# Patient Record
Sex: FEMALE | Race: WHITE | NOT HISPANIC OR LATINO | Employment: FULL TIME | URBAN - METROPOLITAN AREA
[De-identification: names, ages, dates, MRNs, and addresses within clinical notes are randomized per-mention and may not be internally consistent; named-entity substitution may affect disease eponyms.]

---

## 2017-02-06 ENCOUNTER — ALLSCRIPTS OFFICE VISIT (OUTPATIENT)
Dept: OTHER | Facility: OTHER | Age: 37
End: 2017-02-06

## 2017-04-11 ENCOUNTER — ALLSCRIPTS OFFICE VISIT (OUTPATIENT)
Dept: OTHER | Facility: OTHER | Age: 37
End: 2017-04-11

## 2017-04-11 LAB
BACTERIA UR QL AUTO: NORMAL
CLUE CELL (HISTORICAL): NORMAL
HYPHAL YEAST (HISTORICAL): NORMAL
PH FLUID (HISTORICAL): NORMAL
TRICHOMONAS (HISTORICAL): NORMAL
YEAST (HISTORICAL): NORMAL

## 2017-04-20 ENCOUNTER — ALLSCRIPTS OFFICE VISIT (OUTPATIENT)
Dept: OTHER | Facility: OTHER | Age: 37
End: 2017-04-20

## 2017-05-01 ENCOUNTER — LAB CONVERSION - ENCOUNTER (OUTPATIENT)
Dept: OTHER | Facility: OTHER | Age: 37
End: 2017-05-01

## 2017-05-01 LAB
ADDITIONAL INFORMATION (HISTORICAL): ABNORMAL
ADEQUACY: (HISTORICAL): ABNORMAL
COMMENT (HISTORICAL): ABNORMAL
CYTOTECHNOLOGIST: (HISTORICAL): ABNORMAL
HPV 18/45 (HISTORICAL): DETECTED
HPV MRNA E6/E7 (HISTORICAL): DETECTED
HPV16 (HISTORICAL): NOT DETECTED
INTERPRETATION (HISTORICAL): ABNORMAL
LMP (HISTORICAL): ABNORMAL
PREV. BX: (HISTORICAL): ABNORMAL
PREV. PAP (HISTORICAL): ABNORMAL
REVIEWED BY (HISTORICAL): ABNORMAL
SOURCE (HISTORICAL): ABNORMAL

## 2017-05-03 ENCOUNTER — GENERIC CONVERSION - ENCOUNTER (OUTPATIENT)
Dept: OTHER | Facility: OTHER | Age: 37
End: 2017-05-03

## 2017-05-04 ENCOUNTER — GENERIC CONVERSION - ENCOUNTER (OUTPATIENT)
Dept: OTHER | Facility: OTHER | Age: 37
End: 2017-05-04

## 2017-05-11 ENCOUNTER — ALLSCRIPTS OFFICE VISIT (OUTPATIENT)
Dept: OTHER | Facility: OTHER | Age: 37
End: 2017-05-11

## 2017-05-15 ENCOUNTER — GENERIC CONVERSION - ENCOUNTER (OUTPATIENT)
Dept: OTHER | Facility: OTHER | Age: 37
End: 2017-05-15

## 2017-05-15 ENCOUNTER — LAB CONVERSION - ENCOUNTER (OUTPATIENT)
Dept: OTHER | Facility: OTHER | Age: 37
End: 2017-05-15

## 2017-05-15 LAB
ADDITIONAL INFORMATION (HISTORICAL): NORMAL
COMMENT (HISTORICAL): NORMAL
DIAGNOSIS (HISTORICAL): NORMAL
DIAGNOSIS (HISTORICAL): NORMAL
GROSS DESCRIPTION (HISTORICAL): NORMAL
GROSS DESCRIPTION (HISTORICAL): NORMAL
PATHOLOGIST (HISTORICAL): NORMAL
PROCEDURE (HISTORICAL): NORMAL
PROCEDURE (HISTORICAL): NORMAL
SITE/SOURCE (HISTORICAL): NORMAL
SOURCE (HISTORICAL): NORMAL

## 2017-07-05 ENCOUNTER — ALLSCRIPTS OFFICE VISIT (OUTPATIENT)
Dept: OTHER | Facility: OTHER | Age: 37
End: 2017-07-05

## 2017-07-09 ENCOUNTER — HOSPITAL ENCOUNTER (EMERGENCY)
Facility: HOSPITAL | Age: 37
Discharge: HOME | End: 2017-07-09
Attending: EMERGENCY MEDICINE | Admitting: EMERGENCY MEDICINE
Payer: COMMERCIAL

## 2017-07-09 VITALS
OXYGEN SATURATION: 96 % | SYSTOLIC BLOOD PRESSURE: 138 MMHG | RESPIRATION RATE: 18 BRPM | HEART RATE: 62 BPM | WEIGHT: 175.04 LBS | HEIGHT: 64 IN | DIASTOLIC BLOOD PRESSURE: 69 MMHG | BODY MASS INDEX: 29.88 KG/M2

## 2017-07-09 DIAGNOSIS — R42 LIGHTHEADEDNESS: Primary | ICD-10-CM

## 2017-07-09 DIAGNOSIS — R11.0 NAUSEA: ICD-10-CM

## 2017-07-09 DIAGNOSIS — R51.9 HEADACHE: ICD-10-CM

## 2017-07-09 LAB
ANION GAP SERPL CALCULATED.3IONS-SCNC: 6 MMOL/L (ref 4–13)
BASOPHILS # BLD AUTO: 0.01 THOUSANDS/ΜL (ref 0–0.1)
BASOPHILS NFR BLD AUTO: 0 % (ref 0–1)
BUN SERPL-MCNC: 8 MG/DL (ref 5–25)
CALCIUM SERPL-MCNC: 9 MG/DL (ref 8.3–10.1)
CHLORIDE SERPL-SCNC: 104 MMOL/L (ref 100–108)
CO2 SERPL-SCNC: 29 MMOL/L (ref 21–32)
CREAT SERPL-MCNC: 0.72 MG/DL (ref 0.6–1.3)
EOSINOPHIL # BLD AUTO: 0 THOUSAND/ΜL (ref 0–0.61)
EOSINOPHIL NFR BLD AUTO: 0 % (ref 0–6)
ERYTHROCYTE [DISTWIDTH] IN BLOOD BY AUTOMATED COUNT: 11.6 % (ref 11.6–15.1)
GFR SERPL CREATININE-BSD FRML MDRD: >60 ML/MIN/1.73SQ M
GLUCOSE SERPL-MCNC: 100 MG/DL (ref 65–140)
HCG UR QL: NORMAL
HCT VFR BLD AUTO: 38.9 % (ref 34.8–46.1)
HGB BLD-MCNC: 13.4 G/DL (ref 11.5–15.4)
LYMPHOCYTES # BLD AUTO: 1.26 THOUSANDS/ΜL (ref 0.6–4.47)
LYMPHOCYTES NFR BLD AUTO: 16 % (ref 14–44)
MCH RBC QN AUTO: 32.1 PG (ref 26.8–34.3)
MCHC RBC AUTO-ENTMCNC: 34.4 G/DL (ref 31.4–37.4)
MCV RBC AUTO: 93 FL (ref 82–98)
MONOCYTES # BLD AUTO: 0.54 THOUSAND/ΜL (ref 0.17–1.22)
MONOCYTES NFR BLD AUTO: 7 % (ref 4–12)
NEUTROPHILS # BLD AUTO: 6.04 THOUSANDS/ΜL (ref 1.85–7.62)
NEUTS SEG NFR BLD AUTO: 77 % (ref 43–75)
PLATELET # BLD AUTO: 258 THOUSANDS/UL (ref 149–390)
PMV BLD AUTO: 10.4 FL (ref 8.9–12.7)
POTASSIUM SERPL-SCNC: 3.7 MMOL/L (ref 3.5–5.3)
RBC # BLD AUTO: 4.18 MILLION/UL (ref 3.81–5.12)
SODIUM SERPL-SCNC: 139 MMOL/L (ref 136–145)
WBC # BLD AUTO: 7.85 THOUSAND/UL (ref 4.31–10.16)

## 2017-07-09 PROCEDURE — 81025 URINE PREGNANCY TEST: CPT | Performed by: PHYSICIAN ASSISTANT

## 2017-07-09 PROCEDURE — 96374 THER/PROPH/DIAG INJ IV PUSH: CPT

## 2017-07-09 PROCEDURE — 96361 HYDRATE IV INFUSION ADD-ON: CPT

## 2017-07-09 PROCEDURE — 85025 COMPLETE CBC W/AUTO DIFF WBC: CPT | Performed by: PHYSICIAN ASSISTANT

## 2017-07-09 PROCEDURE — 99284 EMERGENCY DEPT VISIT MOD MDM: CPT

## 2017-07-09 PROCEDURE — 80048 BASIC METABOLIC PNL TOTAL CA: CPT | Performed by: PHYSICIAN ASSISTANT

## 2017-07-09 PROCEDURE — 96375 TX/PRO/DX INJ NEW DRUG ADDON: CPT

## 2017-07-09 PROCEDURE — 93005 ELECTROCARDIOGRAM TRACING: CPT | Performed by: PHYSICIAN ASSISTANT

## 2017-07-09 PROCEDURE — 36415 COLL VENOUS BLD VENIPUNCTURE: CPT | Performed by: PHYSICIAN ASSISTANT

## 2017-07-09 RX ORDER — DIPHENHYDRAMINE HYDROCHLORIDE 50 MG/ML
25 INJECTION INTRAMUSCULAR; INTRAVENOUS ONCE
Status: COMPLETED | OUTPATIENT
Start: 2017-07-09 | End: 2017-07-09

## 2017-07-09 RX ORDER — METRONIDAZOLE 500 MG/1
500 TABLET ORAL EVERY 8 HOURS SCHEDULED
COMMUNITY
End: 2018-02-12 | Stop reason: ALTCHOICE

## 2017-07-09 RX ORDER — KETOROLAC TROMETHAMINE 30 MG/ML
30 INJECTION, SOLUTION INTRAMUSCULAR; INTRAVENOUS ONCE
Status: COMPLETED | OUTPATIENT
Start: 2017-07-09 | End: 2017-07-09

## 2017-07-09 RX ORDER — METOCLOPRAMIDE HYDROCHLORIDE 5 MG/ML
10 INJECTION INTRAMUSCULAR; INTRAVENOUS ONCE
Status: COMPLETED | OUTPATIENT
Start: 2017-07-09 | End: 2017-07-09

## 2017-07-09 RX ORDER — METOCLOPRAMIDE 10 MG/1
10 TABLET ORAL EVERY 6 HOURS PRN
Qty: 12 TABLET | Refills: 0 | Status: SHIPPED | OUTPATIENT
Start: 2017-07-09 | End: 2017-07-12

## 2017-07-09 RX ADMIN — SODIUM CHLORIDE 1000 ML: 0.9 INJECTION, SOLUTION INTRAVENOUS at 17:34

## 2017-07-09 RX ADMIN — KETOROLAC TROMETHAMINE 30 MG: 30 INJECTION, SOLUTION INTRAMUSCULAR at 17:38

## 2017-07-09 RX ADMIN — METOCLOPRAMIDE 10 MG: 5 INJECTION, SOLUTION INTRAMUSCULAR; INTRAVENOUS at 17:41

## 2017-07-09 RX ADMIN — DIPHENHYDRAMINE HYDROCHLORIDE 25 MG: 50 INJECTION, SOLUTION INTRAMUSCULAR; INTRAVENOUS at 17:36

## 2017-07-10 LAB
ATRIAL RATE: 63 BPM
P AXIS: -20 DEGREES
PR INTERVAL: 136 MS
QRS AXIS: 61 DEGREES
QRSD INTERVAL: 80 MS
QT INTERVAL: 400 MS
QTC INTERVAL: 409 MS
T WAVE AXIS: 53 DEGREES
VENTRICULAR RATE: 63 BPM

## 2017-07-11 ENCOUNTER — HOSPITAL ENCOUNTER (OUTPATIENT)
Dept: RADIOLOGY | Facility: HOSPITAL | Age: 37
Discharge: HOME | End: 2017-07-11
Payer: COMMERCIAL

## 2017-07-11 ENCOUNTER — APPOINTMENT (OUTPATIENT)
Dept: LAB | Facility: HOSPITAL | Age: 37
End: 2017-07-11
Payer: COMMERCIAL

## 2017-07-11 ENCOUNTER — ALLSCRIPTS OFFICE VISIT (OUTPATIENT)
Dept: OTHER | Facility: OTHER | Age: 37
End: 2017-07-11

## 2017-07-11 ENCOUNTER — TRANSCRIBE ORDERS (OUTPATIENT)
Dept: ADMINISTRATIVE | Facility: HOSPITAL | Age: 37
End: 2017-07-11

## 2017-07-11 DIAGNOSIS — R51.9 HEADACHE, UNSPECIFIED HEADACHE TYPE: ICD-10-CM

## 2017-07-11 DIAGNOSIS — F41.1 ANXIETY STATE, UNSPECIFIED: ICD-10-CM

## 2017-07-11 DIAGNOSIS — R51.9 HEADACHE, UNSPECIFIED HEADACHE TYPE: Primary | ICD-10-CM

## 2017-07-11 DIAGNOSIS — F41.1 GENERALIZED ANXIETY DISORDER: ICD-10-CM

## 2017-07-11 LAB
ALBUMIN SERPL BCP-MCNC: 4.3 G/DL (ref 3.5–5)
ALP SERPL-CCNC: 62 U/L (ref 46–116)
ALT SERPL W P-5'-P-CCNC: 40 U/L (ref 12–78)
ANION GAP SERPL CALCULATED.3IONS-SCNC: 6 MMOL/L (ref 4–13)
AST SERPL W P-5'-P-CCNC: 31 U/L (ref 5–45)
BILIRUB SERPL-MCNC: 0.56 MG/DL (ref 0.2–1)
BUN SERPL-MCNC: 6 MG/DL (ref 5–25)
CALCIUM SERPL-MCNC: 9.8 MG/DL (ref 8.3–10.1)
CHLORIDE SERPL-SCNC: 103 MMOL/L (ref 100–108)
CO2 SERPL-SCNC: 30 MMOL/L (ref 21–32)
CREAT SERPL-MCNC: 0.71 MG/DL (ref 0.6–1.3)
GFR SERPL CREATININE-BSD FRML MDRD: >60 ML/MIN/1.73SQ M
GLUCOSE SERPL-MCNC: 93 MG/DL (ref 65–140)
POTASSIUM SERPL-SCNC: 4.2 MMOL/L (ref 3.5–5.3)
PROT SERPL-MCNC: 7.7 G/DL (ref 6.4–8.2)
SODIUM SERPL-SCNC: 139 MMOL/L (ref 136–145)
TSH SERPL DL<=0.05 MIU/L-ACNC: 1.97 UIU/ML (ref 0.36–3.74)

## 2017-07-11 PROCEDURE — 86618 LYME DISEASE ANTIBODY: CPT

## 2017-07-11 PROCEDURE — 84443 ASSAY THYROID STIM HORMONE: CPT

## 2017-07-11 PROCEDURE — 36415 COLL VENOUS BLD VENIPUNCTURE: CPT

## 2017-07-11 PROCEDURE — 70450 CT HEAD/BRAIN W/O DYE: CPT

## 2017-07-11 PROCEDURE — 80053 COMPREHEN METABOLIC PANEL: CPT

## 2017-07-13 LAB
B BURGDOR IGG SER IA-ACNC: 0.05
B BURGDOR IGM SER IA-ACNC: 0.21

## 2017-08-01 ENCOUNTER — ALLSCRIPTS OFFICE VISIT (OUTPATIENT)
Dept: OTHER | Facility: OTHER | Age: 37
End: 2017-08-01

## 2017-11-27 ENCOUNTER — ALLSCRIPTS OFFICE VISIT (OUTPATIENT)
Dept: OTHER | Facility: OTHER | Age: 37
End: 2017-11-27

## 2017-11-27 LAB
BACTERIA UR QL AUTO: NORMAL
CLUE CELL (HISTORICAL): NORMAL
HYPHAL YEAST (HISTORICAL): NORMAL
KOH PREP (HISTORICAL): NEGATIVE
PH FLUID (HISTORICAL): NORMAL
TRICHOMONAS (HISTORICAL): NORMAL
YEAST (HISTORICAL): NORMAL

## 2017-11-29 ENCOUNTER — LAB CONVERSION - ENCOUNTER (OUTPATIENT)
Dept: OTHER | Facility: OTHER | Age: 37
End: 2017-11-29

## 2017-11-29 LAB — CLINICAL COMMENT (HISTORICAL): NORMAL

## 2017-12-22 ENCOUNTER — ALLSCRIPTS OFFICE VISIT (OUTPATIENT)
Dept: OTHER | Facility: OTHER | Age: 37
End: 2017-12-22

## 2018-01-12 VITALS
HEIGHT: 64 IN | DIASTOLIC BLOOD PRESSURE: 74 MMHG | BODY MASS INDEX: 29.88 KG/M2 | SYSTOLIC BLOOD PRESSURE: 122 MMHG | WEIGHT: 175 LBS

## 2018-01-12 NOTE — RESULT NOTES
Verified Results  TISSUE, 2 SPECIMENS [id number] 12:00AM [facility]     Test Name Result Flag Reference   A SOURCE      11 o'clock  Cervix   A PROCEDURE      None given   A GROSS DESCRIPTION      Cervix 11:00  In formalin, Single fragment of   tan, gray tissue; 0 5 x 0 3 x 0 2 cm  A S  1 in   1  Gross exam(s) performed at: [address], [address] [facility] [address]    : Ligia Tracy MD PHD   A DIAGNOSIS      - [address] [facility]  - The atypia noted is inconclusive for HPV effect   - CHRONIC CERVICITIS    B SOURCE      Endocervix   B PROCEDURE      ECC with brush   B GROSS DESCRIPTION      ECC brush  In formalin  Fragments of soft tan,   brown tissue and mucus; cytobrush; 2 2 x 1 5 x   0 3 cm in aggregate  A S  - 1  B DIAGNOSIS      - STRIPS OF BENIGN ENDOCERVICAL EPITHELIUM AND   MUCUS AND BLOOD  A COMMENT      Immunostains for proliferative activity and p16   do not show appropriate increased reactivity with   the epithelial lining which excludes the   possibility of JAVY      (Q) TISSUE PATHOLOGY [id number] 12:00AM [facility]     Test Name Result Flag Reference   CLINICAL INFORMATION      39year-old  LMP 17  Pap smear within   normal limits  Positive high risk HPV     Corey Denny MD, (electronic signature)  Boarded in Anatomic and Clinical Pathology  Cytopathology

## 2018-01-13 VITALS
WEIGHT: 173 LBS | SYSTOLIC BLOOD PRESSURE: 114 MMHG | DIASTOLIC BLOOD PRESSURE: 66 MMHG | BODY MASS INDEX: 29.53 KG/M2 | HEIGHT: 64 IN

## 2018-01-13 VITALS
SYSTOLIC BLOOD PRESSURE: 112 MMHG | BODY MASS INDEX: 29.71 KG/M2 | HEIGHT: 64 IN | WEIGHT: 174 LBS | DIASTOLIC BLOOD PRESSURE: 64 MMHG

## 2018-01-14 VITALS
HEART RATE: 82 BPM | BODY MASS INDEX: 29.62 KG/M2 | HEIGHT: 64 IN | SYSTOLIC BLOOD PRESSURE: 122 MMHG | TEMPERATURE: 100.3 F | WEIGHT: 173.5 LBS | DIASTOLIC BLOOD PRESSURE: 76 MMHG

## 2018-01-14 VITALS
WEIGHT: 175.25 LBS | SYSTOLIC BLOOD PRESSURE: 130 MMHG | HEART RATE: 80 BPM | TEMPERATURE: 96.9 F | DIASTOLIC BLOOD PRESSURE: 72 MMHG | BODY MASS INDEX: 29.92 KG/M2 | HEIGHT: 64 IN

## 2018-01-14 VITALS
WEIGHT: 175.38 LBS | TEMPERATURE: 97.8 F | SYSTOLIC BLOOD PRESSURE: 118 MMHG | HEIGHT: 64 IN | HEART RATE: 82 BPM | DIASTOLIC BLOOD PRESSURE: 72 MMHG | BODY MASS INDEX: 29.94 KG/M2

## 2018-01-14 VITALS
DIASTOLIC BLOOD PRESSURE: 70 MMHG | SYSTOLIC BLOOD PRESSURE: 118 MMHG | WEIGHT: 172 LBS | HEIGHT: 64 IN | BODY MASS INDEX: 29.37 KG/M2

## 2018-01-15 ENCOUNTER — ALLSCRIPTS OFFICE VISIT (OUTPATIENT)
Dept: OTHER | Facility: OTHER | Age: 38
End: 2018-01-15

## 2018-01-16 NOTE — PROGRESS NOTES
Assessment   1  Contraceptive education (V25 09) (Z30 09)    Plan   Contraceptive education    · Follow-up visit in 2 weeks Evaluation and Treatment  Follow-up  Status: Hold For -    Scheduling  Requested for: [id number]   Ordered; For: Contraceptive education; Ordered By: Rosalina Peralta Performed:  Due: [id number]    Discussion/Summary   Goals and Barriers: The patient has the current Goals: Effective contraception  The patent has the current Barriers: None  Patient's Capacity to Self-Care: Patient is able to Self-Care  Patient Education: Educational resources provided: Mirena Pamphlet  Chief Complaint   Chief Complaint Free Text Note Form: I would like to discuss contraception      History of Present Illness   HPI: Pt is a 40 y o   with LMP 2018 who presents to discuss alternative contraception  She reports her previous partner had a vasectomy, but they are no longer together  SHe reports she is currently using condoms  She reports her menses are crampy and moderately heavy and she would like to address that as well  We reviewed options including ocps, orthoevra, nuvaring, POPs, depo provera, nexplanon, Mirena, paragard, barrier methods, and permanent contraception  PT reports she is most interested in [place]  Pt most interested in Mirena--reviewed risks of irregular bleeding, infection, expulsion, uterine perforation, failure and ectopic  Pt would like to proceed--will check benefits  Review of Systems   Focused-Female:      Constitutional: No fever, no chills, feels well, no tiredness, no recent weight gain or loss  ENT: no ear ache, no loss of hearing, no nosebleeds or nasal discharge, no sore throat or hoarseness  Cardiovascular: no complaints of slow or fast heart rate, no chest pain, no palpitations, no leg claudication or lower extremity edema  Respiratory: no complaints of shortness of breath, no wheezing, no dyspnea on exertion, no orthopnea or PND        Breasts: no complaints of breast pain, breast lump or nipple discharge  Gastrointestinal: no complaints of abdominal pain, no constipation, no nausea or diarrhea, no vomiting, no bloody stools  Genitourinary: as noted in HPI  Musculoskeletal: no complaints of arthralgia, no myalgia, no joint swelling or stiffness, no limb pain or swelling  Integumentary: no complaints of skin rash or lesion, no itching or dry skin, no skin wounds  Neurological: no complaints of headache, no confusion, no numbness or tingling, no dizziness or fainting  Active Problems   1  Acute bronchitis (466 0) (J20 9)   2  Dietary calcium deficiency (269 3) (E58)   3  Generalized anxiety disorder (300 02) (F41 1)   4  Mittelschmerz (625 2) (N94 0)   5  Pap smear abnormality of cervix/human papillomavirus (HPV) positive (795 09,079 4)     (R87 89)   6  PMS (premenstrual syndrome) (625 4) (N94 3)    Past Medical History   1  History of Attention disturbance (799 51) (R41 840)   2  History of Bacterial vaginosis (616 10,041 9) (N76 0,B96 89)   3  History of Cervical cancer screening (V76 2) (Z12 4)   4  History of Encounter for genetic screening (V82 79) (Z13 79)   5  History of Encounter for gynecological examination without abnormal finding (V72 31)     (Z01 419)   6  History of candidiasis (V12 09) (Z86 19)   7  Denied: History of herpes simplex infection   8  History of pregnancy (V13 29)   9  History of Possible exposure to STD (V01 6) (Z20 2)   10  History of Pressure in head (784 0) (R51)   11  History of URTI (acute upper respiratory infection) (465 9) (J06 9)   12  History of Varicella without complication (606 5) (M15 7)    Surgical History   1  Denied: History Of Prior Surgery    Family History   Mother    1  Family history of Healthy adult  Father    2  Family history of Healthy adult  Daughter    3  No pertinent family history  Paternal Grandmother    3   Family history of pancreatic cancer (V16 0) (Z80 0)  Maternal Grandfather    5  Family history of hypertension (V17 49) (Z82 49)  Aunt    6  Family history of Sjogren's disease (V17 89) (Z80 70)    Social History    · Education history   · Denied: History of drug use   · Never A Smoker   · No preference on Restoration beliefs   · Occasional alcohol use   · Occupation   · Single    Allergies   1  Amoxil CAPS    Vitals   Vital Signs    Recorded: 59VEB1417 47:41QJ   Systolic 202   Diastolic 74   Height 5 ft 4 in   Weight 182 lb    BMI Calculated 31 24   BSA Calculated 1 88   LMP [id number]     Physical Exam        Constitutional      General appearance: No acute distress, well appearing and well nourished  Neck      Neck: Normal, supple, trachea midline, no masses  Pulmonary      Respiratory effort: No increased work of breathing or signs of respiratory distress  Skin      Skin and subcutaneous tissue: Normal skin turgor and no rashes         Psychiatric      Orientation to person, place, and time: Normal        Mood and affect: Normal        Signatures    Electronically signed by : ROWAN Greco ; Timmy 15 2018 10:03AM EST                       (Author)

## 2018-01-16 NOTE — MISCELLANEOUS
Message  5/3/17 1600: LMOVM to call back  Pap cells normal but HR HPVpresent  Recommend colposcopic eval  BEO  5/3/17 PM: pt returns call, above discussed, colpo eval 5/11/17   BEO      Signatures   Electronically signed by : Phil Bumpers, M D ; May  4 2017  7:17AM EST                       (Author)

## 2018-01-17 NOTE — RESULT NOTES
Verified Results  (Q) THINPREP TIS PAP AND HPV mRNA E6/E7 REFLEX HPV 16,18/45 [id number] 12:00AM [facility]     Test Name Result Flag Reference   CLINICAL INFORMATION:      35 Y/O  NO H/O ABN PAP   LMP:      [id number]   PREV  PAP:      NONE GIVEN   PREV  BX:      NONE GIVEN   SOURCE:      Cervix, Endocervix   STATEMENT OF ADEQUACY:      Satisfactory for evaluation  Endocervical/transformation zone component  present  INTERPRETATION/RESULT:      Negative for intraepithelial lesion or malignancy  COMMENT:      This Pap test has been evaluated with computer  assisted technology  CYTOTECHNOLOGIST:      [id number] TOOTIE Urbano(ASCP)  CT screening location: [address] [place] [address], [address] [place] [address]   HPV mRNA E6/E7 Detected A Not Detected   This test was performed using the APTIMA HPV Assay ([employer] )  This assay detects E6/E7 viral messenger RNA (mRNA) from 14  high-risk HPV types (16,18,31,33,35,39,45,51,52,56,58,59,66,68)  REVIEW CYTOTECHNOLOGIST:      SPS,CT(ASCP)  Ct screening location: [address] [place] [address], [address] [place] [address] 40, [id number] 12:00AM [facility]     Test Name Result Flag Reference   HPV 16 RNA NOT DETECTED  NOT DETECTED   HPV 18/45 RNA DETECTED A NOT DETECTED   This test was performed using the APTIMA HPV 16 18/45  genotype assay ([employer] )  The assay can   differentiate HPV 16 from HPV 18 and/or HPV 45, but   does not differentiate between HPV 18 and HPV 45

## 2018-01-22 VITALS
BODY MASS INDEX: 31.07 KG/M2 | DIASTOLIC BLOOD PRESSURE: 74 MMHG | WEIGHT: 182 LBS | SYSTOLIC BLOOD PRESSURE: 128 MMHG | HEIGHT: 64 IN

## 2018-01-23 VITALS
HEART RATE: 64 BPM | HEIGHT: 64 IN | BODY MASS INDEX: 30.45 KG/M2 | WEIGHT: 178.38 LBS | RESPIRATION RATE: 16 BRPM | TEMPERATURE: 97.9 F | DIASTOLIC BLOOD PRESSURE: 80 MMHG | SYSTOLIC BLOOD PRESSURE: 110 MMHG

## 2018-01-23 NOTE — MISCELLANEOUS
Message  Return to work or school:   Charline Jesus is under my professional care   She was seen in my office on 12/22/2017   She is able to return to work on  12/22/2017            Signatures   Electronically signed by : Lui Cabrales; Dec 22 2017 11:56AM EST                       (Author)

## 2018-02-02 ENCOUNTER — DOCUMENTATION (OUTPATIENT)
Dept: OBGYN CLINIC | Facility: CLINIC | Age: 38
End: 2018-02-02

## 2018-02-12 ENCOUNTER — OFFICE VISIT (OUTPATIENT)
Dept: OBGYN CLINIC | Facility: CLINIC | Age: 38
End: 2018-02-12
Payer: COMMERCIAL

## 2018-02-12 VITALS — BODY MASS INDEX: 31.76 KG/M2 | DIASTOLIC BLOOD PRESSURE: 72 MMHG | WEIGHT: 185 LBS | SYSTOLIC BLOOD PRESSURE: 122 MMHG

## 2018-02-12 DIAGNOSIS — Z30.430 ENCOUNTER FOR IUD INSERTION: Primary | ICD-10-CM

## 2018-02-12 PROBLEM — N94.0 MITTELSCHMERZ: Status: ACTIVE | Noted: 2017-04-20

## 2018-02-12 PROBLEM — E58 DIETARY CALCIUM DEFICIENCY: Status: ACTIVE | Noted: 2017-04-20

## 2018-02-12 PROBLEM — N94.3 PMS (PREMENSTRUAL SYNDROME): Status: ACTIVE | Noted: 2017-04-20

## 2018-02-12 PROBLEM — R87.618 PAP SMEAR ABNORMALITY OF CERVIX/HUMAN PAPILLOMAVIRUS (HPV) POSITIVE: Status: ACTIVE | Noted: 2017-05-02

## 2018-02-12 LAB — SL AMB POCT URINE HCG: NEGATIVE

## 2018-02-12 PROCEDURE — 81025 URINE PREGNANCY TEST: CPT | Performed by: OBSTETRICS & GYNECOLOGY

## 2018-02-12 PROCEDURE — 58300 INSERT INTRAUTERINE DEVICE: CPT | Performed by: OBSTETRICS & GYNECOLOGY

## 2018-02-12 RX ORDER — METHYLPREDNISOLONE 4 MG/1
TABLET ORAL
Refills: 0 | COMMUNITY
Start: 2017-12-22 | End: 2018-02-12 | Stop reason: ALTCHOICE

## 2018-02-12 RX ORDER — AZITHROMYCIN 250 MG/1
TABLET, FILM COATED ORAL
Refills: 0 | COMMUNITY
Start: 2017-12-22 | End: 2018-02-12 | Stop reason: ALTCHOICE

## 2018-02-12 NOTE — PATIENT INSTRUCTIONS
Levonorgestrel (Into the uterus)   Levonorgestrel (cmj-hti-mmn-MIGEL-trel)  Prevents pregnancy and treats heavy menstrual bleeding  This is an intrauterine device (IUD), which is a reversible form of birth control  This IUD slowly releases levonorgestrel, a hormone  Brand Name(s): [facility]   There may be other brand names for this medicine  When This Medicine Should Not Be Used: This device is not right for everyone  Do not use it if you had an allergic reaction to levonorgestrel, or you are pregnant  How to Use This Medicine:   Device  · The IUD is usually inserted by your doctor during your monthly period  You will need to see your doctor 4 to 6 weeks after the IUD is placed and then once a year  · Your IUD has a string or "tail" that is made of plastic thread  About one or two inches of this string hangs into your vagina  You cannot see this string, and it will not cause problems when you have sex  Check your IUD after each monthly period  You may not be protected against pregnancy if you cannot feel the string or if you feel plastic  Do the following to check the placement of your IUD:  [facility] your hands with soap and warm water  Dry them with a clean towel  ¨ Bend your knees and squat low to the ground  ¨ Gently put your index finger high inside your vagina  The cervix is at the top of the vagina  Find the IUD string coming from your cervix  Never pull on the string  You should not be able to feel the plastic of the IUD itself  Wash your hands after you are done checking your IUD string  · Your doctor will need to replace your IUD after 3 years for [facility] or [place], or after 5 years for [facility] or [place] [address]  You will also need to have it replaced if it comes out of your uterus  Drugs and Foods to Avoid:   Ask your doctor or pharmacist before using any other medicine, including over-the-counter medicines, vitamins, and herbal products    · Some medicines can affect how this device works  Tell your doctor if you are using a blood thinner (including warfarin)  Warnings While Using This Medicine:   · Tell your doctor if you are breastfeeding, or you have had a baby, miscarriage, or  in the past 3 months  Tell your doctor if you have liver disease (including tumor or cancer), breast cancer, heart or blood circulation problems, including a history of heart valve problems, heart disease, blood clotting problems, stroke, heart attack, or high blood pressure  Tell your doctor if you have problems with your immune system or have had surgery on your female organs (especially fallopian tubes)  · Tell your doctor if you have had any problems, infections, or other conditions that affected your reproductive system  There are many problems that could make an IUD a bad choice for you, including if you have fibroids, unexplained bleeding, a uterus that has an unusual shape, a recent infection, pelvic inflammatory disease, abnormal Pap test, ectopic pregnancy, cancer or suspected cancer, or an existing IUD  · There is a small chance that you could get pregnant when using an IUD, just as there is with any birth control  If you get pregnant, your doctor may remove your IUD to lower the risk of miscarriage or other problems  · This medicine may cause the following problems:  ¨ Increased risk of ectopic pregnancy (pregnancy outside the uterus)  ¨ Increased risk of a serious infection called pelvic inflammatory disease (PID)  ¨ Increased risk for ovarian cysts  ¨ Perforation (hole in the wall of your uterus), which can damage other organs  · You might have some spotting and cramping during the first weeks after the IUD has been inserted  These symptoms should decrease or go away within a few weeks up to 6 months  · You could have less bleeding or even stop having periods by the end of the first year   Call your doctor if you have a change from the regular bleeding pattern after you have had your IUD for awhile, such as more bleeding or if you miss a period (and you were having periods even with your IUD)  · An IUD can slip partly or all of the way out of your uterus  If this happens, use condoms or another form of birth control, and call your doctor right away  · This IUD will not protect you from HIV/AIDS, herpes, or other sexually transmitted diseases  · If you have the Julio C Gonzalez or Yareli Moe, tell your healthcare provider before you have an MRI test   Possible Side Effects While Using This Medicine:   Call your doctor right away if you notice any of these side effects:  · Allergic reaction: Itching or hives, swelling in your face or hands, swelling or tingling in your mouth or throat, chest tightness, trouble breathing  · Chest pain, problems with speech or walking, numbness or weakness in your arm or leg or on one side of your body  · Heavy bleeding from your vagina  · Pain during sex, or if your partner feels the hard plastic of the IUD during sex  · Severe headache, vision changes  · Stomach or pelvic pain, tenderness, or cramping that is sudden or severe  · Vaginal discharge has a bad smell, fever, chills, sores on your genitals  · Yellow skin or eyes  If you notice these less serious side effects, talk with your doctor:   · Acne or other skin changes  · Breast pain  · Change in bleeding pattern after the first few months  · Dizziness or lightheadedness after IUD is placed  · Mild itching around your vagina and genitals  If you notice other side effects that you think are caused by this medicine, tell your doctor  Call your doctor for medical advice about side effects  You may report side effects to FDA at 0-348-FDA-2680  © 2017 [address] [employer] Information is for End User's use only and may not be sold, redistributed or otherwise used for commercial purposes  The above information is an  only  It is not intended as medical advice for individual conditions or treatments  Talk to your doctor, nurse or pharmacist before following any medical regimen to see if it is safe and effective for you

## 2018-02-12 NOTE — PROGRESS NOTES
Iud insertions  Date/Time: 2/12/2018 2:37 PM  Performed by: Elizabeth Jones by: Beatris Frazier     Consent:     Consent obtained:  Written    Consent given by:  Patient    Procedure risks and benefits discussed: yes      Patient questions answered: yes      Patient agrees, verbalizes understanding, and wants to proceed: yes      Educational handouts given: yes      Instructions and paperwork completed: yes    Procedure:     Pelvic exam performed: yes      Negative urine pregnancy test: yes      Cervix cleaned and prepped: yes      Speculum placed in vagina: yes      Tenaculum applied to cervix: yes Vivian Copas)      IUD inserted with no complications: yes      IUD type:  Mirena    Strings trimmed: yes (3-4 cm)      Uterus sound depth (cm):  9 (8 5 cm)  Post-procedure:     Patient tolerated procedure well: yes      Patient will follow up after next period: yes

## 2018-02-20 ENCOUNTER — TELEPHONE (OUTPATIENT)
Dept: OBGYN CLINIC | Facility: CLINIC | Age: 38
End: 2018-02-20

## 2018-02-20 ENCOUNTER — DOCUMENTATION (OUTPATIENT)
Dept: OBGYN CLINIC | Facility: CLINIC | Age: 38
End: 2018-02-20

## 2018-02-20 NOTE — PROGRESS NOTES
Verification returned  Patient is covered at 100% with no co pay  Patient aware and appointment scheduled 02/12/2018  Patient interested in the insertion of Mirena, submitted verification, awaiting result

## 2018-05-24 ENCOUNTER — OFFICE VISIT (OUTPATIENT)
Dept: OBGYN CLINIC | Facility: CLINIC | Age: 38
End: 2018-05-24
Payer: COMMERCIAL

## 2018-05-24 VITALS
DIASTOLIC BLOOD PRESSURE: 68 MMHG | WEIGHT: 190.4 LBS | BODY MASS INDEX: 32.68 KG/M2 | HEART RATE: 91 BPM | SYSTOLIC BLOOD PRESSURE: 118 MMHG

## 2018-05-24 DIAGNOSIS — R14.0 BLOATING: ICD-10-CM

## 2018-05-24 DIAGNOSIS — R10.2 PELVIC PAIN: Primary | ICD-10-CM

## 2018-05-24 DIAGNOSIS — Z30.431 CONTRACEPTIVE, SURVEILLANCE, INTRAUTERINE DEVICE: ICD-10-CM

## 2018-05-24 DIAGNOSIS — R10.2 RIGHT ADNEXAL TENDERNESS: ICD-10-CM

## 2018-05-24 PROCEDURE — 99214 OFFICE O/P EST MOD 30 MIN: CPT | Performed by: OBSTETRICS & GYNECOLOGY

## 2018-05-24 NOTE — PROGRESS NOTES
Patient is a 40 y o  [facility] with Patient's last menstrual period was 2018 (approximate)  who presents requesting evaluation of abnormal bleeding, pelvic bloating and facial hair growth  The pt reports she had a Mirena inserted in 2018 for contraception and cycle control  She reports she notices that she has bloating the week to week and half before her menses  She also reports she has soreness in her bilateral lower quadrants with coughing  She reports she occasionally feels sore in her lower abdomen  She reports a monthly menses, but the duration is different each month  She denies post coital bleeding or bleeding between cycles  She reports that she used to have breast tenderness before her menses that would resolve with her menses, but now it lasts the whole duration of her menses  She reports that all of these symptoms existed prior to her mirena, but it is getting worse since her Mirena insertion  Reviewed with patient that she could try evening primrose oil to help her mastalgia  She does report that her menses are lighter than prior to her Mirena and she is happy with that  Pt would like to make sure that her Mirena is appropriately located and that everything since she never followed up afterwards       Past Medical History:   Diagnosis Date    Abnormal Pap smear of cervix     2017-wnl, HPV + (18/45), colpo    BV (bacterial vaginosis)     Herpes        Past Surgical History:   Procedure Laterality Date    DILATION AND CURETTAGE, DIAGNOSTIC / THERAPEUTIC      ETOP       OB History    Para Term  AB Living   2 1 1   1     SAB TAB Ectopic Multiple Live Births     1            # Outcome Date GA Lbr Edis/2nd Weight Sex Delivery Anes PTL Lv   2 TAB            1 Term               Obstetric Comments   Menarche: 15       Gyn HX:  menorrhagia      Current Outpatient Prescriptions:     levonorgestrel (MIRENA) 20 MCG/24HR IUD, 1 each by Intrauterine route once, Disp: , Rfl: Allergies   Allergen Reactions    Amoxicillin Rash     Category: Allergy;        Social History     Social History    Marital status: Single     Spouse name: N/A    Number of children: 1    Years of education: associates degree     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    Smoking status: Never Smoker    Smokeless tobacco: Never Used    Alcohol use 0 6 oz/week     1 Glasses of wine per week    Drug use: No    Sexual activity: Yes     Partners: Male     Birth control/ protection: IUD      Comment: Mirena     Other Topics Concern    None     Social History Narrative    Yarsani: no preference    Accepts blood products       Family History   Problem Relation Age of Onset    No Known Problems Mother     No Known Problems Father     No Known Problems Sister     Alzheimer's disease Maternal Grandmother     Cancer Paternal Grandmother      unsure what type    Breast cancer Neg Hx     Ovarian cancer Neg Hx     Colon cancer Neg Hx        Review of Systems   Constitutional: Negative for chills, fatigue, fever and unexpected weight change  HENT: Negative for congestion, mouth sores and sore throat  Respiratory: Negative for cough, chest tightness, shortness of breath and wheezing  Cardiovascular: Negative for chest pain and palpitations  Gastrointestinal: Positive for abdominal distention  Negative for abdominal pain, constipation, diarrhea, nausea and vomiting  Endocrine: Negative for cold intolerance and heat intolerance  Genitourinary: Positive for menstrual problem and pelvic pain  Negative for dyspareunia, dysuria, genital sores, vaginal bleeding, vaginal discharge and vaginal pain  Musculoskeletal: Negative for arthralgias  Skin: Negative for color change and rash  Neurological: Negative for dizziness, light-headedness and headaches  Hematological: Negative for adenopathy         Blood pressure 118/68, pulse 91, weight 86 4 kg (190 lb 6 4 oz), last menstrual period 2018, not currently breastfeeding  and Body mass index is 32 68 kg/m²  Physical Exam   Constitutional: She is oriented to person, place, and time  She appears well-developed and well-nourished  HENT:   Head: Normocephalic and atraumatic  Eyes: Conjunctivae and EOM are normal    Neck: Normal range of motion  Pulmonary/Chest: Effort normal    Abdominal: Soft  Bowel sounds are normal  She exhibits no distension and no mass  There is no tenderness  There is guarding (right lower quadrant)  Musculoskeletal: Normal range of motion  She exhibits no edema or tenderness  Neurological: She is alert and oriented to person, place, and time  Skin: Skin is warm  No rash noted  No erythema  Psychiatric: She has a normal mood and affect  Her behavior is normal  Judgment and thought content normal         vulva: normal external genitalia for age and no lesions, masses, epithelial changes, or exudate  vagina: color pink, rugae  well formed rugae, discharge  white and bleeding  without any bleeding  cervix: parous, no lesions  and  IUD string  3 cm  uterus: NSSC, AF, NT, mobile  adnexa: Right ovary enlarged and tender to palpation, Left ovary normal size and nontender      A/P:  Pt is a 40 y o   with      Diagnoses and all orders for this visit:    Pelvic pain  -     US pelvis complete non OB; Future    Bloating  -     US pelvis complete non OB; Future    Right adnexal tenderness  -     US pelvis complete non OB;  Future    Contraceptive, surveillance, intrauterine device    IUD appropriately located

## 2018-05-26 ENCOUNTER — HOSPITAL ENCOUNTER (OUTPATIENT)
Dept: RADIOLOGY | Facility: HOSPITAL | Age: 38
Discharge: HOME | End: 2018-05-26
Attending: OBSTETRICS & GYNECOLOGY
Payer: COMMERCIAL

## 2018-05-26 DIAGNOSIS — R10.2 RIGHT ADNEXAL TENDERNESS: ICD-10-CM

## 2018-05-26 DIAGNOSIS — R10.2 PELVIC PAIN: ICD-10-CM

## 2018-05-26 DIAGNOSIS — R14.0 BLOATING: ICD-10-CM

## 2018-05-26 PROCEDURE — 76856 US EXAM PELVIC COMPLETE: CPT

## 2018-05-26 PROCEDURE — 76830 TRANSVAGINAL US NON-OB: CPT

## 2018-05-29 ENCOUNTER — TELEPHONE (OUTPATIENT)
Dept: OBGYN CLINIC | Facility: CLINIC | Age: 38
End: 2018-05-29

## 2018-05-29 NOTE — TELEPHONE ENCOUNTER
Pt called looking for ultrasound results  She is worried   Would you be able to review results and call pt?

## 2018-05-31 ENCOUNTER — TELEPHONE (OUTPATIENT)
Dept: OBGYN CLINIC | Facility: CLINIC | Age: 38
End: 2018-05-31

## 2018-05-31 PROBLEM — D25.1 FIBROIDS, INTRAMURAL: Status: ACTIVE | Noted: 2018-05-31

## 2018-05-31 NOTE — TELEPHONE ENCOUNTER
Called patient to review her u/s results: reviewed that her uterus contains a small intramural fibroid measuring 3 3 cm in maximum measurement  Her IUD is appropriately located  Both adnexa are wnl  Pt reassured  Reviewed that her fibroid is no impinging on her endometrium and is not likely to be the source of her premenstrual bloating

## 2018-06-25 ENCOUNTER — OFFICE VISIT (OUTPATIENT)
Dept: FAMILY MEDICINE CLINIC | Facility: CLINIC | Age: 38
End: 2018-06-25
Payer: COMMERCIAL

## 2018-06-25 VITALS
BODY MASS INDEX: 33.57 KG/M2 | HEART RATE: 64 BPM | WEIGHT: 196.6 LBS | RESPIRATION RATE: 16 BRPM | DIASTOLIC BLOOD PRESSURE: 68 MMHG | TEMPERATURE: 97.4 F | SYSTOLIC BLOOD PRESSURE: 126 MMHG | HEIGHT: 64 IN

## 2018-06-25 DIAGNOSIS — R05.9 COUGH: Primary | ICD-10-CM

## 2018-06-25 PROCEDURE — 99213 OFFICE O/P EST LOW 20 MIN: CPT | Performed by: FAMILY MEDICINE

## 2018-06-25 RX ORDER — PREDNISONE 20 MG/1
TABLET ORAL
Qty: 11 TABLET | Refills: 0 | Status: SHIPPED | OUTPATIENT
Start: 2018-06-25 | End: 2018-08-16

## 2018-06-25 NOTE — PROGRESS NOTES
FAMILY PRACTICE OFFICE VISIT       NAME: Barrie Art  AGE: 40 y o  SEX: female       : 1980        MRN: [id number]    DATE: 2018  TIME: 5:29 PM    Assessment and Plan     Problem List Items Addressed This Visit     Cough - Primary    Relevant Medications    predniSONE 20 mg tablet          Cough appears to be of an allergic etiology  She was given prescription for prednisone tapering dose consisting of 40 milligrams x3 days, 20 milligrams x3 days, 10 milligrams x4 days  If symptoms improve only to recur we may consider steroid inhaler  If there is no change in symptoms we may consider other etiologies such as gastric reflux  There are no Patient Instructions on file for this visit  Chief Complaint     Chief Complaint   Patient presents with    Cough     Patient is here c/o a pesistent dry cough x's 1+ wks  History of Present Illness     Patient describes symptoms of throat irritation and scratchiness which causes a dry cough  Symptoms may come at different parts of the year and she has had allergy testing in the past with minimal sensitivities to mold or horses  She denies any recent illness  Review of Systems   Review of Systems   Constitutional: Negative  HENT:        As per HPI   Respiratory: Positive for cough  Cardiovascular: Negative  Gastrointestinal: Negative  Skin: Negative          Active Problem List     Patient Active Problem List   Diagnosis    Dietary calcium deficiency    Generalized anxiety disorder    [place]    Pap smear abnormality of cervix/human papillomavirus (HPV) positive    PMS (premenstrual syndrome)    Encounter for IUD insertion    Pelvic pain    Bloating    Right adnexal tenderness    Contraceptive, surveillance, intrauterine device    Fibroids, intramural    Cough       Past Medical History:  Past Medical History:   Diagnosis Date    Abnormal Pap smear of cervix     2017-wnl, HPV + (18/45), colpo    Attention disturbance     last assessed: 8/1/2017    BV (bacterial vaginosis)     Herpes        Past Surgical History:  Past Surgical History:   Procedure Laterality Date    DILATION AND CURETTAGE, DIAGNOSTIC / THERAPEUTIC      ETOP       Family History:  Family History   Problem Relation Age of Onset    No Known Problems Mother     No Known Problems Father     No Known Problems Sister     Alzheimer's disease Maternal Grandmother     Hypertension Maternal Grandfather     Cancer Paternal Grandmother         unsure what type    Pancreatic cancer Paternal Grandmother     No Known Problems Daughter     Sjogren's syndrome Other         sjogren's disease    Breast cancer Neg Hx     Ovarian cancer Neg Hx     Colon cancer Neg Hx        Social History:  Social History     Social History    Marital status: Single     Spouse name: N/A    Number of children: 1    Years of education: associates degree; 2 yr college     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    Smoking status: Never Smoker    Smokeless tobacco: Never Used    Alcohol use 0 6 oz/week     1 Glasses of wine per week      Comment: occasional alcohol use    Drug use: No    Sexual activity: Yes     Partners: Male     Birth control/ protection: IUD      Comment: Mirena     Other Topics Concern    Not on file     Social History Narrative    Orthodox: no preference    Accepts blood products           Objective     Vitals:    06/25/18 1629   BP: 126/68   Pulse: 64   Resp: 16   Temp: (!) 97 4 °F (36 3 °C)     Wt Readings from Last 3 Encounters:   06/25/18 89 2 kg (196 lb 9 6 oz)   05/24/18 86 4 kg (190 lb 6 4 oz)   02/12/18 83 9 kg (185 lb)       Physical Exam   Constitutional: No distress  HENT:   Mouth/Throat: Oropharynx is clear and moist  No oropharyngeal exudate     Tympanic membranes within normal limits bilaterally   Cardiovascular:   Regular rate and rhythm with no murmurs   Pulmonary/Chest:   Lungs are clear to auscultation without wheezes,rales, or rhonchi   Lymphadenopathy:     She has no cervical adenopathy  Skin: No rash noted  Pertinent Laboratory/Diagnostic Studies:  Lab Results   Component Value Date    GLUCOSE 93 07/11/2017    BUN 6 07/11/2017    CREATININE 0 71 07/11/2017    CALCIUM 9 8 07/11/2017     07/11/2017    K 4 2 07/11/2017    CO2 30 07/11/2017     07/11/2017     Lab Results   Component Value Date    ALT 40 07/11/2017    AST 31 07/11/2017    ALKPHOS 62 07/11/2017    BILITOT 0 56 07/11/2017       Lab Results   Component Value Date    WBC 7 85 07/09/2017    HGB 13 4 07/09/2017    HCT 38 9 07/09/2017    MCV 93 07/09/2017     07/09/2017       No results found for: TSH    No results found for: CHOL  No results found for: TRIG  No results found for: HDL  No results found for: LDLCALC  No results found for: HGBA1C    Results for orders placed or performed in visit on 02/12/18   POCT urine HCG   Result Value Ref Range     URINE HCG negative        No orders of the defined types were placed in this encounter  ALLERGIES:  Allergies   Allergen Reactions    Amoxicillin Rash     Category: Allergy;        Current Medications     Current Outpatient Prescriptions   Medication Sig Dispense Refill    levonorgestrel (MIRENA) 20 MCG/24HR IUD 1 each by Intrauterine route once      predniSONE 20 mg tablet 2 tabs X 3 days, 1 tab X 3 days, 1/2 tab X 4 days 11 tablet 0     No current facility-administered medications for this visit  Health Maintenance     Health Maintenance   Topic Date Due    HIV SCREENING  1980    Depression Screening PHQ-9  1980    DTaP,Tdap,and Td Vaccines (1 - Tdap) 12/01/2001    INFLUENZA VACCINE  09/01/2018       There is no immunization history on file for this patient      Dari Pinto MD

## 2018-08-16 ENCOUNTER — ANNUAL EXAM (OUTPATIENT)
Dept: OBGYN CLINIC | Facility: CLINIC | Age: 38
End: 2018-08-16
Payer: COMMERCIAL

## 2018-08-16 VITALS
WEIGHT: 200.2 LBS | HEIGHT: 65 IN | BODY MASS INDEX: 33.36 KG/M2 | HEART RATE: 70 BPM | DIASTOLIC BLOOD PRESSURE: 76 MMHG | OXYGEN SATURATION: 98 % | SYSTOLIC BLOOD PRESSURE: 122 MMHG

## 2018-08-16 DIAGNOSIS — E66.9 OBESITY (BMI 30.0-34.9): ICD-10-CM

## 2018-08-16 DIAGNOSIS — R53.83 OTHER FATIGUE: ICD-10-CM

## 2018-08-16 DIAGNOSIS — E58 DIETARY CALCIUM DEFICIENCY: ICD-10-CM

## 2018-08-16 DIAGNOSIS — Z72.3 INADEQUATE EXERCISE: ICD-10-CM

## 2018-08-16 DIAGNOSIS — Z12.4 PAP SMEAR FOR CERVICAL CANCER SCREENING: ICD-10-CM

## 2018-08-16 DIAGNOSIS — Z30.431 CONTRACEPTIVE, SURVEILLANCE, INTRAUTERINE DEVICE: ICD-10-CM

## 2018-08-16 DIAGNOSIS — Z01.419 ENCOUNTER FOR ANNUAL ROUTINE GYNECOLOGICAL EXAMINATION: Primary | ICD-10-CM

## 2018-08-16 PROBLEM — Z30.430 ENCOUNTER FOR IUD INSERTION: Status: RESOLVED | Noted: 2018-02-12 | Resolved: 2018-08-16

## 2018-08-16 PROBLEM — E66.811 OBESITY (BMI 30.0-34.9): Status: ACTIVE | Noted: 2018-08-16

## 2018-08-16 PROBLEM — R10.2 RIGHT ADNEXAL TENDERNESS: Status: RESOLVED | Noted: 2018-05-24 | Resolved: 2018-08-16

## 2018-08-16 PROBLEM — R05.9 COUGH: Status: RESOLVED | Noted: 2018-06-25 | Resolved: 2018-08-16

## 2018-08-16 PROBLEM — R10.2 PELVIC PAIN: Status: RESOLVED | Noted: 2018-05-24 | Resolved: 2018-08-16

## 2018-08-16 PROCEDURE — S0612 ANNUAL GYNECOLOGICAL EXAMINA: HCPCS | Performed by: OBSTETRICS & GYNECOLOGY

## 2018-08-16 NOTE — PROGRESS NOTES
Pt is a 40 y o   with Patient's last menstrual period was 2018  using Mirena for [facility] presents for preventive care  She notes the same partner since her last STI evaluation  In her lifetime she has been involved with >5 partners   Safe sexual practices (monogomy, condoms) are followed consistently  · She does  feel safe in the relationship  She does feel safe in her home  · Her calcium intake encompasses cheese, yogurt and greens (michael, turnip, kale, etc) for a total of 2-3 servings daily on average  She does not take additional Vitamin D (MVI or supplement)  · She exercises 1-2 times per week  · Her menses occur every 30 Days, last 4-5 days and require regular tampons every 8-12 hours  Menstrual History:  OB History      Para Term  AB Living    2 1 1   1 1    SAB TAB Ectopic Multiple Live Births      1              Obstetric Comments    Menarche: 15   (as per allscripts)   x 1    30/5 days/regular tampon every 8-12 hours           Menarche age: 15  Patient's last menstrual period was 2018  ·      · She has never recieved an HPV vaccine  · tobacco use : does not use tobacco              · Last pap 2017--+HPV, repeat today        Past Medical History:   Diagnosis Date    Abnormal Pap smear of cervix     2017-wnl, HPV + (18/45), colpo    Attention disturbance     last assessed: 2017    BV (bacterial vaginosis)     Herpes        Past Surgical History:   Procedure Laterality Date    DILATION AND CURETTAGE, DIAGNOSTIC / THERAPEUTIC      ETOP       OB History    Para Term  AB Living   2 1 1   1 1   SAB TAB Ectopic Multiple Live Births     1            # Outcome Date GA Lbr Edis/2nd Weight Sex Delivery Anes PTL Lv   2 TAB            1 Term               Obstetric Comments   Menarche: 15    (as per allscripts)    x 1      30/5 days/regular tampon every 8-12 hours       Gyn HX:  genital herpes and abnormal pap       Current Outpatient Prescriptions:     levonorgestrel (MIRENA) 20 MCG/24HR IUD, 1 each by Intrauterine route once, Disp: , Rfl:     Allergies   Allergen Reactions    Amoxicillin Rash     Category: Allergy;        Social History     Social History    Marital status: Single     Spouse name: N/A    Number of children: 1    Years of education: associates degree; 2 yr college     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    Smoking status: Never Smoker    Smokeless tobacco: Never Used    Alcohol use 0 6 oz/week     1 Glasses of wine per week      Comment: occasional alcohol use    Drug use: No    Sexual activity: Yes     Partners: Male     Birth control/ protection: IUD      Comment: Mirena     Other Topics Concern    None     Social History Narrative    Gnosticism: no preference    Accepts blood products        Exercise: 1-2x/week    Calcium: 2-3 servings of cheese daily, occ yogurt           Family History   Problem Relation Age of Onset    No Known Problems Mother     No Known Problems Father     No Known Problems Sister     Alzheimer's disease Maternal Grandmother     Hypertension Maternal Grandfather     Cancer Paternal Grandmother         unsure what type    Pancreatic cancer Paternal Grandmother     No Known Problems Daughter     Sjogren's syndrome Other         sjogren's disease    Breast cancer Neg Hx     Ovarian cancer Neg Hx     Colon cancer Neg Hx        Blood pressure 122/76, pulse 70, height 5' 4 96" (1 65 m), weight 90 8 kg (200 lb 3 2 oz), last menstrual period 07/26/2018, SpO2 98 %, not currently breastfeeding  and Body mass index is 33 36 kg/m²  Physical Exam   Constitutional: She is oriented to person, place, and time  She appears well-developed and well-nourished  HENT:   Head: Normocephalic and atraumatic  Eyes: Conjunctivae and EOM are normal    Neck: Normal range of motion  Neck supple  No tracheal deviation present  No thyromegaly present  Cardiovascular: Normal rate, regular rhythm and normal heart sounds  Pulmonary/Chest: Effort normal and breath sounds normal  No stridor  No respiratory distress  She has no wheezes  She has no rales  Abdominal: Soft  Bowel sounds are normal  She exhibits no distension and no mass  There is no tenderness  There is no rebound and no guarding  Musculoskeletal: Normal range of motion  She exhibits no edema or tenderness  Lymphadenopathy:     She has no cervical adenopathy  Neurological: She is alert and oriented to person, place, and time  Skin: Skin is warm  No rash noted  No erythema  Psychiatric: She has a normal mood and affect  Her behavior is normal  Judgment and thought content normal      Breasts: breasts appear normal, no suspicious masses, no skin or nipple changes or axillary nodes, right breast normal without mass, skin or nipple changes or axillary nodes, left breast normal without mass, skin or nipple changes or axillary nodes  vulva: normal external genitalia for age and no lesions, masses, epithelial changes, or exudate  vagina: color pink, rugae  well formed rugae, discharge  white and bleeding  without any bleeding  cervix: parous, no lesions ,  IUD string  3 cm and pap obtained  uterus: NSSC, AF, NT, mobile  adnexa: no masses or tenderness      A/P:  Pt is a 40 y o   with      Diagnoses and all orders for this visit:    Encounter for annual routine gynecological examination    Pap smear for cervical cancer screening  -     Thinprep Tis Pap and HPV mRNA E6/E7 Reflex HPV 16,18/45    Contraceptive, surveillance, intrauterine device    Dietary calcium deficiency    Inadequate exercise    Obesity (BMI 30 0-34 9)    Other fatigue  -     Vitamin D 25 hydroxy; Future  -     TSH, 3rd generation with Free T4 reflex; Future  -     Vitamin D 25 hydroxy  -     TSH, 3rd generation with Free T4 reflex      Patient advised recommendation of daily dietary calcium of 1000 mg calcium  Patient advised recommendation of exercise 5 times per week for 30 minutes  Patient advised recommendation of BMI to be between 19-25

## 2018-08-17 PROBLEM — E55.9 VITAMIN D INSUFFICIENCY: Status: ACTIVE | Noted: 2018-08-17

## 2018-08-17 LAB
25(OH)D3 SERPL-MCNC: 22 NG/ML (ref 30–100)
TSH SERPL-ACNC: 1.35 MIU/L

## 2018-08-22 LAB
CLINICAL INFO: ABNORMAL
CYTO CVX: ABNORMAL
DATE PREVIOUS BX: ABNORMAL
GEN CATEG CVX/VAG CYTO-IMP: ABNORMAL
HPV E6+E7 MRNA CVX QL NAA+PROBE: DETECTED
LMP START DATE: ABNORMAL
MICROORGANISM CVX/VAG CYTO: ABNORMAL
SL AMB PREV. PAP:: ABNORMAL
SPECIMEN SOURCE CVX/VAG CYTO: ABNORMAL

## 2018-08-24 ENCOUNTER — TELEPHONE (OUTPATIENT)
Dept: OBGYN CLINIC | Facility: CLINIC | Age: 38
End: 2018-08-24

## 2018-08-24 PROBLEM — R87.810 ASCUS WITH POSITIVE HIGH RISK HPV CERVICAL: Status: ACTIVE | Noted: 2018-08-24

## 2018-08-24 PROBLEM — R87.610 ASCUS WITH POSITIVE HIGH RISK HPV CERVICAL: Status: ACTIVE | Noted: 2018-08-24

## 2018-08-24 NOTE — TELEPHONE ENCOUNTER
Pt notified of ASCUS +HPV and recommendation for colposcopy (pt has had in the past)  Pt agreeable  Watch file for the same

## 2018-09-10 ENCOUNTER — OFFICE VISIT (OUTPATIENT)
Dept: OBGYN CLINIC | Facility: CLINIC | Age: 38
End: 2018-09-10
Payer: COMMERCIAL

## 2018-09-10 VITALS
BODY MASS INDEX: 33.32 KG/M2 | WEIGHT: 200 LBS | SYSTOLIC BLOOD PRESSURE: 128 MMHG | DIASTOLIC BLOOD PRESSURE: 66 MMHG | HEIGHT: 65 IN

## 2018-09-10 DIAGNOSIS — R87.810 ASCUS WITH POSITIVE HIGH RISK HPV CERVICAL: Primary | ICD-10-CM

## 2018-09-10 DIAGNOSIS — R87.610 ASCUS WITH POSITIVE HIGH RISK HPV CERVICAL: Primary | ICD-10-CM

## 2018-09-10 PROCEDURE — 57454 BX/CURETT OF CERVIX W/SCOPE: CPT | Performed by: OBSTETRICS & GYNECOLOGY

## 2018-09-10 NOTE — PROGRESS NOTES
Colposcopy  Date/Time: 9/10/2018 12:30 PM  Performed by: Evert Buerger by: Joleen Vasquez     Consent:     Consent obtained:  Written    Consent given by:  Patient    Procedural risks discussed:  Bleeding, failure rate, infection, possible continued pain and repeat procedure    Patient questions answered: yes      Patient agrees, verbalizes understanding, and wants to proceed: yes      Educational handouts given: yes      Instructions and paperwork completed: yes    Pre-procedure:     Pre-procedure timeout performed: yes      Prepped with: acetic acid    Indication:     Indication:  ASC-US (+HPV)  Procedure:     Procedure: Colposcopy w/ cervical biopsy and ECC      Page speculum was placed in the vagina: yes      Under colposcopic examination the transition zone was seen in entirety: yes      Endocervix was curetted using a Kevorkian curette: yes      Cervical biopsy performed with a cervical biopsy punch: yes      Monsel's solution was applied: yes      Biopsy(s): yes      Location:  12 oclock    Specimen to pathology: yes    Post-procedure:     Findings: Mosaicism      Impression: Low grade cervical dysplasia      Patient tolerance of procedure:   Tolerated well, no immediate complications      Physical Exam   Genitourinary:       Genitourinary Comments: Mosaicism noted at 12 o'clock

## 2018-09-12 LAB
CLINICAL INFO: NORMAL
PROCEDURE TYPE: NORMAL
PROCEDURE TYPE: NORMAL
SPECIMEN SOURCE: NORMAL
SPECIMEN SOURCE: NORMAL

## 2018-09-13 ENCOUNTER — TELEPHONE (OUTPATIENT)
Dept: OBGYN CLINIC | Facility: CLINIC | Age: 38
End: 2018-09-13

## 2018-09-14 NOTE — TELEPHONE ENCOUNTER
I called the patient back and got her voicemail  I left her a message on the machine stating that her biopsy was unremarkable and that I recommend a repeat pap in a year  If the patient calls back, please re-iterate this  Additionally place her on the watch file for repeat pap in 1 year   Thanks

## 2018-10-01 ENCOUNTER — OFFICE VISIT (OUTPATIENT)
Dept: FAMILY MEDICINE CLINIC | Facility: CLINIC | Age: 38
End: 2018-10-01
Payer: COMMERCIAL

## 2018-10-01 VITALS
WEIGHT: 200.4 LBS | SYSTOLIC BLOOD PRESSURE: 100 MMHG | RESPIRATION RATE: 16 BRPM | DIASTOLIC BLOOD PRESSURE: 70 MMHG | BODY MASS INDEX: 33.39 KG/M2 | HEART RATE: 64 BPM | TEMPERATURE: 97.1 F | HEIGHT: 65 IN

## 2018-10-01 DIAGNOSIS — J01.90 ACUTE NON-RECURRENT SINUSITIS, UNSPECIFIED LOCATION: Primary | ICD-10-CM

## 2018-10-01 PROCEDURE — 1036F TOBACCO NON-USER: CPT | Performed by: NURSE PRACTITIONER

## 2018-10-01 PROCEDURE — 99213 OFFICE O/P EST LOW 20 MIN: CPT | Performed by: NURSE PRACTITIONER

## 2018-10-01 RX ORDER — ALBUTEROL SULFATE 90 UG/1
2 AEROSOL, METERED RESPIRATORY (INHALATION) EVERY 6 HOURS PRN
Qty: 18 G | Refills: 0 | Status: SHIPPED | OUTPATIENT
Start: 2018-10-01 | End: 2021-08-27 | Stop reason: SDUPTHER

## 2018-10-01 RX ORDER — DOXYCYCLINE 100 MG/1
100 CAPSULE ORAL 2 TIMES DAILY
Qty: 14 CAPSULE | Refills: 0 | Status: SHIPPED | OUTPATIENT
Start: 2018-10-01 | End: 2018-10-08

## 2018-10-01 NOTE — PROGRESS NOTES
FAMILY PRACTICE OFFICE VISIT       NAME: Deloris Bautista  AGE: 40 y o  SEX: female       : 1980        MRN: [id number]    DATE: 10/1/2018  TIME: 3:49 PM    Assessment and Plan     Problem List Items Addressed This Visit     None      Visit Diagnoses     Acute non-recurrent sinusitis, unspecified location    -  Primary    Relevant Medications    doxycycline monohydrate (MONODOX) 100 mg capsule    albuterol (PROVENTIL HFA) 90 mcg/act inhaler          1  Acute non-recurrent sinusitis, unspecified location  doxycycline monohydrate (MONODOX) 100 mg capsule    albuterol (PROVENTIL HFA) 90 mcg/act inhaler     Patient presents today with symptoms consistent with sinusitis  Recommend treatment with doxycycline 100 mg twice daily for 7 days  Take medication with food  Albuterol inhaler as needed for cough every 6 hours  Patient states she has used this in the past, which helps with cough, when she is sick  Old inhaler has   Continue supportive care:  Rest, increase fluids, warm fluids, honey, and humidification  May use Tylenol or ibuprofen as needed  If symptoms worsen, or if symptoms do not improve over the next 3 days, instructed to call the office  Chief Complaint     Chief Complaint   Patient presents with   Stoney Slimmer Like Symptoms     pt is here for sinus congestion and sore throat 1 + wk       History of Present Illness     Patient presents today with cough, sore throat, congestion, fatigue, and hoarse voice for the past 1 week  Denies fevers and chills  Has been using Tylenol cold and Robitussin with minimal relief  Her son has been sick with similar symptoms  Review of Systems   Review of Systems   Constitutional: Positive for fatigue  Negative for chills, diaphoresis and fever  HENT: Positive for congestion, postnasal drip, rhinorrhea, sinus pressure and sore throat  Ear pain: ears cracking  Respiratory: Positive for cough   Negative for chest tightness, shortness of breath and wheezing  Cardiovascular: Negative for chest pain, palpitations and leg swelling  Musculoskeletal: Negative for myalgias  Neurological: Positive for headaches  Negative for dizziness  Active Problem List     Patient Active Problem List   Diagnosis    Dietary calcium deficiency    Generalized anxiety disorder    [place]    Pap smear abnormality of cervix/human papillomavirus (HPV) positive    PMS (premenstrual syndrome)    Bloating    Contraceptive, surveillance, intrauterine device    Fibroids, intramural    Encounter for annual routine gynecological examination    Pap smear for cervical cancer screening    Inadequate exercise    Obesity (BMI 30 0-34  9)    Other fatigue    Vitamin D insufficiency    ASCUS with positive high risk HPV cervical       Past Medical History:  Past Medical History:   Diagnosis Date    Abnormal Pap smear of cervix     4/2017-wnl, HPV + (18/45), colpo; 8/2018-ASCUS + HPV: 9/18-colpo squamous atypia; 9/2019    Attention disturbance     last assessed: 8/1/2017    BV (bacterial vaginosis)     Herpes        Past Surgical History:  Past Surgical History:   Procedure Laterality Date    DILATION AND CURETTAGE, DIAGNOSTIC / THERAPEUTIC      ETOP       Family History:  Family History   Problem Relation Age of Onset    No Known Problems Mother     No Known Problems Father     No Known Problems Sister     Alzheimer's disease Maternal Grandmother     Hypertension Maternal Grandfather     Cancer Paternal Grandmother         unsure what type    Pancreatic cancer Paternal Grandmother     No Known Problems Daughter     Sjogren's syndrome Other         sjogren's disease    Breast cancer Neg Hx     Ovarian cancer Neg Hx     Colon cancer Neg Hx        Social History:  Social History     Social History    Marital status: Single     Spouse name: N/A    Number of children: 1    Years of education: associates degree; 2 yr college     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    Smoking status: Never Smoker    Smokeless tobacco: Never Used    Alcohol use 0 6 oz/week     1 Glasses of wine per week      Comment: occasional alcohol use    Drug use: No    Sexual activity: Yes     Partners: Male     Birth control/ protection: IUD      Comment: Mirena     Other Topics Concern    Not on file     Social History Narrative    Lutheran: no preference    Accepts blood products        Exercise: 1-2x/week    Calcium: 2-3 servings of cheese daily, occ yogurt           I have reviewed the patient's medical history in detail; there are no changes to the history as noted in the electronic medical record  Objective     Vitals:    10/01/18 1505   BP: 100/70   Pulse: 64   Resp: 16   Temp: (!) 97 1 °F (36 2 °C)   TempSrc: Tympanic   Weight: 90 9 kg (200 lb 6 4 oz)   Height: 5' 4 8" (1 646 m)     Wt Readings from Last 3 Encounters:   10/01/18 90 9 kg (200 lb 6 4 oz)   09/10/18 90 7 kg (200 lb)   08/16/18 90 8 kg (200 lb 3 2 oz)     Body mass index is 33 55 kg/m²  PHQ-9 Depression Screening    PHQ-9:    Frequency of the following problems over the past two weeks:            Physical Exam   Constitutional: She appears well-developed and well-nourished  No distress  HENT:   Head: Normocephalic and atraumatic  Right Ear: Tympanic membrane and ear canal normal    Left Ear: Tympanic membrane and ear canal normal    Nose: Mucosal edema present  Mouth/Throat: Posterior oropharyngeal erythema (post nasal drip) present  No oropharyngeal exudate or posterior oropharyngeal edema  Eyes: Conjunctivae are normal    Neck: Normal range of motion  Neck supple  Cardiovascular: Normal rate, regular rhythm and normal heart sounds  No murmur heard  Pulmonary/Chest: Effort normal and breath sounds normal    Lymphadenopathy:     She has no cervical adenopathy  Psychiatric: She has a normal mood and affect  Nursing note and vitals reviewed        ALLERGIES:  Allergies Allergen Reactions    Amoxicillin Rash     Category: Allergy;        Current Medications     Current Outpatient Prescriptions   Medication Sig Dispense Refill    levonorgestrel (MIRENA) 20 MCG/24HR IUD 1 each by Intrauterine route once      albuterol (PROVENTIL HFA) 90 mcg/act inhaler Inhale 2 puffs every 6 (six) hours as needed (cough, chest tightness, shortness of breath) 18 g 0    doxycycline monohydrate (MONODOX) 100 mg capsule Take 1 capsule (100 mg total) by mouth 2 (two) times a day for 7 days 14 capsule 0     No current facility-administered medications for this visit  Health Maintenance     Health Maintenance   Topic Date Due    DTaP,Tdap,and Td Vaccines (1 - Tdap) 12/01/2001    INFLUENZA VACCINE  09/01/2018    Depression Screening PHQ  11/01/2018 (Originally 1980)       There is no immunization history on file for this patient      Amanda Welch Faster

## 2018-10-01 NOTE — LETTER
October 1, 2018     Patient: Adrianna Meehan   YOB: 1980   Date of Visit: 10/1/2018       To Whom it May Concern:    Salvador Reyes is under my professional care  She was seen in my office on 10/1/2018  She may return to work on 10/03/2018  If you have any questions or concerns, please don't hesitate to call           Sincerely,          QUE Washburn        CC: No Recipients

## 2018-10-04 ENCOUNTER — DOCUMENTATION (OUTPATIENT)
Dept: FAMILY MEDICINE CLINIC | Facility: CLINIC | Age: 38
End: 2018-10-04

## 2018-10-04 ENCOUNTER — TELEPHONE (OUTPATIENT)
Dept: FAMILY MEDICINE CLINIC | Facility: CLINIC | Age: 38
End: 2018-10-04

## 2018-10-04 NOTE — TELEPHONE ENCOUNTER
Patient wants to know if we can extend her work note till today returning tomorrow (Friday)  Please advise patient at [phone]

## 2019-02-05 ENCOUNTER — OFFICE VISIT (OUTPATIENT)
Dept: OBGYN CLINIC | Facility: CLINIC | Age: 39
End: 2019-02-05
Payer: COMMERCIAL

## 2019-02-05 VITALS — DIASTOLIC BLOOD PRESSURE: 74 MMHG | WEIGHT: 210 LBS | BODY MASS INDEX: 35.16 KG/M2 | SYSTOLIC BLOOD PRESSURE: 122 MMHG

## 2019-02-05 DIAGNOSIS — L30.9 VULVAR DERMATITIS: Primary | ICD-10-CM

## 2019-02-05 PROCEDURE — 99213 OFFICE O/P EST LOW 20 MIN: CPT | Performed by: NURSE PRACTITIONER

## 2019-02-05 NOTE — PROGRESS NOTES
Assessment/Plan:    1  Vulvar dermatitis  Area of itching likely caused by chemical/ mechanical irritation  Patient worried about vulvar cancer-- reassured  Advised to cease shaving area and cease using soaps  Topical triam twice daily for 2 wks  RV in 3 wks for recheck  - triamcinolone (KENALOG) 0 1 % ointment; Apply topically 2 (two) times a day for 14 days  Dispense: 30 g; Refill: 0      Subjective:      Patient ID: Deloris Bautista is a 45 y o  female  HPI  PROBLEM VISIT  CC: genital itching    Patient presents for evaluation are itching in the genital area which has been going on for a couple months  Started as sporatic itching/ irritation around clitoris, on the skin; has been more frequent lately  No abn vag discharge, odor, pelvic pain, urinary symptoms  No recent antibiotic treatment  No changes in soaps or detergents  Sexually active, partner of about 2 years    9/2018 Colpo (ASCUS w/ +HPV)- negative, cervicitis  Mirena IUD  hx HSV- has not had an outbreak in a while  The following portions of the patient's history were reviewed and updated as appropriate: allergies, current medications, past family history, past medical history, past social history, past surgical history and problem list     Last May 2018 had an US for discomfort  Saw a uterine fibroid  Review of Systems   Constitutional: Negative for chills and fever  Genitourinary: Negative for dyspareunia, dysuria, frequency, genital sores, hematuria, menstrual problem, pelvic pain, urgency, vaginal bleeding, vaginal discharge and vaginal pain  Focal spot of itching in right periclitoral area         Objective:    /74 (BP Location: Left arm, Patient Position: Sitting)   Wt 95 3 kg (210 lb)   LMP 01/15/2019 (Exact Date)   Breastfeeding? No   BMI 35 16 kg/m²      Physical Exam   Constitutional: She is oriented to person, place, and time  She appears well-developed and well-nourished     HENT:   Head: Normocephalic and atraumatic  Pulmonary/Chest: Effort normal    Genitourinary:       There is no rash, tenderness, lesion or injury on the right labia  There is no rash, tenderness, lesion or injury on the left labia  Uterus is not enlarged and not tender  Cervix exhibits no motion tenderness, no discharge and no friability  Right adnexum displays no mass and no tenderness  Left adnexum displays no mass and no tenderness  No erythema, tenderness or bleeding in the vagina  No foreign body in the vagina  No signs of injury around the vagina  No vaginal discharge found  Lymphadenopathy:        Right: No inguinal adenopathy present  Left: No inguinal adenopathy present  Neurological: She is alert and oriented to person, place, and time  Psychiatric: She has a normal mood and affect   Her behavior is normal  Thought content normal

## 2019-05-24 ENCOUNTER — OFFICE VISIT (OUTPATIENT)
Dept: FAMILY MEDICINE CLINIC | Facility: CLINIC | Age: 39
End: 2019-05-24
Payer: COMMERCIAL

## 2019-05-24 VITALS
WEIGHT: 203.5 LBS | TEMPERATURE: 98.6 F | SYSTOLIC BLOOD PRESSURE: 110 MMHG | DIASTOLIC BLOOD PRESSURE: 80 MMHG | OXYGEN SATURATION: 98 % | HEART RATE: 57 BPM | RESPIRATION RATE: 18 BRPM | BODY MASS INDEX: 33.91 KG/M2 | HEIGHT: 65 IN
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
## 2019-05-24 DIAGNOSIS — S80.862A INSECT BITE OF LEFT LOWER LEG, INITIAL ENCOUNTER: ICD-10-CM

## 2019-05-24 DIAGNOSIS — L03.116 CELLULITIS OF LEFT LOWER EXTREMITY: Primary | ICD-10-CM

## 2019-05-24 PROCEDURE — 99213 OFFICE O/P EST LOW 20 MIN: CPT | Performed by: NURSE PRACTITIONER

## 2019-05-24 RX ORDER — DOXYCYCLINE 100 MG/1
100 CAPSULE ORAL 2 TIMES DAILY
Qty: 14 CAPSULE | Refills: 0 | Status: SHIPPED | OUTPATIENT
Start: 2019-05-24 | End: 2019-05-31

## 2019-06-21 ENCOUNTER — OFFICE VISIT (OUTPATIENT)
Dept: FAMILY MEDICINE CLINIC | Facility: CLINIC | Age: 39
End: 2019-06-21
Payer: COMMERCIAL

## 2019-06-21 VITALS
WEIGHT: 203 LBS | TEMPERATURE: 97.3 F | HEART RATE: 71 BPM | SYSTOLIC BLOOD PRESSURE: 110 MMHG | RESPIRATION RATE: 16 BRPM | BODY MASS INDEX: 33.82 KG/M2 | DIASTOLIC BLOOD PRESSURE: 70 MMHG | HEIGHT: 65 IN | OXYGEN SATURATION: 96 %

## 2019-06-21 DIAGNOSIS — M25.50 ARTHRALGIA OF MULTIPLE JOINTS: ICD-10-CM

## 2019-06-21 DIAGNOSIS — K92.1 BLOODY STOOL: Primary | ICD-10-CM

## 2019-06-21 DIAGNOSIS — M79.10 MYALGIA: ICD-10-CM

## 2019-06-21 DIAGNOSIS — R53.83 FATIGUE, UNSPECIFIED TYPE: ICD-10-CM

## 2019-06-21 PROCEDURE — 99213 OFFICE O/P EST LOW 20 MIN: CPT | Performed by: NURSE PRACTITIONER

## 2019-06-21 PROCEDURE — 3008F BODY MASS INDEX DOCD: CPT | Performed by: NURSE PRACTITIONER

## 2019-06-24 ENCOUNTER — TELEPHONE (OUTPATIENT)
Dept: FAMILY MEDICINE CLINIC | Facility: CLINIC | Age: 39
End: 2019-06-24

## 2019-06-24 DIAGNOSIS — E55.9 VITAMIN D DEFICIENCY: Primary | ICD-10-CM

## 2019-06-24 RX ORDER — ERGOCALCIFEROL 1.25 MG/1
50000 CAPSULE ORAL WEEKLY
Qty: 12 CAPSULE | Refills: 0 | Status: SHIPPED | OUTPATIENT
Start: 2019-06-24 | End: 2020-03-30

## 2019-06-25 ENCOUNTER — TELEPHONE (OUTPATIENT)
Dept: FAMILY MEDICINE CLINIC | Facility: CLINIC | Age: 39
End: 2019-06-25

## 2019-06-25 LAB
25(OH)D3 SERPL-MCNC: 19 NG/ML (ref 30–100)
ALBUMIN SERPL-MCNC: 4.4 G/DL (ref 3.6–5.1)
ALBUMIN/GLOB SERPL: 1.6 (CALC) (ref 1–2.5)
ALP SERPL-CCNC: 71 U/L (ref 33–115)
ALT SERPL-CCNC: 14 U/L (ref 6–29)
ANA SER QL IF: NEGATIVE
AST SERPL-CCNC: 14 U/L (ref 10–30)
B BURGDOR AB SER IA-ACNC: <0.9 INDEX
BASOPHILS # BLD AUTO: 30 CELLS/UL (ref 0–200)
BASOPHILS NFR BLD AUTO: 0.4 %
BILIRUB SERPL-MCNC: 0.6 MG/DL (ref 0.2–1.2)
BUN SERPL-MCNC: 15 MG/DL (ref 7–25)
BUN/CREAT SERPL: NORMAL (CALC) (ref 6–22)
CALCIUM SERPL-MCNC: 9.7 MG/DL (ref 8.6–10.2)
CHLORIDE SERPL-SCNC: 102 MMOL/L (ref 98–110)
CO2 SERPL-SCNC: 29 MMOL/L (ref 20–32)
CREAT SERPL-MCNC: 0.75 MG/DL (ref 0.5–1.1)
CRP SERPL-MCNC: 1.1 MG/L
EOSINOPHIL # BLD AUTO: 0 CELLS/UL (ref 15–500)
EOSINOPHIL NFR BLD AUTO: 0 %
ERYTHROCYTE [DISTWIDTH] IN BLOOD BY AUTOMATED COUNT: 11.8 % (ref 11–15)
GLOBULIN SER CALC-MCNC: 2.8 G/DL (CALC) (ref 1.9–3.7)
GLUCOSE SERPL-MCNC: 83 MG/DL (ref 65–139)
HCT VFR BLD AUTO: 42.4 % (ref 35–45)
HGB BLD-MCNC: 14.2 G/DL (ref 11.7–15.5)
LYMPHOCYTES # BLD AUTO: 1658 CELLS/UL (ref 850–3900)
LYMPHOCYTES NFR BLD AUTO: 22.4 %
MCH RBC QN AUTO: 32 PG (ref 27–33)
MCHC RBC AUTO-ENTMCNC: 33.5 G/DL (ref 32–36)
MCV RBC AUTO: 95.5 FL (ref 80–100)
MONOCYTES # BLD AUTO: 451 CELLS/UL (ref 200–950)
MONOCYTES NFR BLD AUTO: 6.1 %
NEUTROPHILS # BLD AUTO: 5261 CELLS/UL (ref 1500–7800)
NEUTROPHILS NFR BLD AUTO: 71.1 %
PLATELET # BLD AUTO: 287 THOUSAND/UL (ref 140–400)
PMV BLD REES-ECKER: 10.2 FL (ref 7.5–12.5)
POTASSIUM SERPL-SCNC: 4.1 MMOL/L (ref 3.5–5.3)
PROT SERPL-MCNC: 7.2 G/DL (ref 6.1–8.1)
RBC # BLD AUTO: 4.44 MILLION/UL (ref 3.8–5.1)
SL AMB EGFR AFRICAN AMERICAN: 117 ML/MIN/1.73M2
SL AMB EGFR NON AFRICAN AMERICAN: 101 ML/MIN/1.73M2
SODIUM SERPL-SCNC: 139 MMOL/L (ref 135–146)
TSH SERPL-ACNC: 1.83 MIU/L
WBC # BLD AUTO: 7.4 THOUSAND/UL (ref 3.8–10.8)

## 2019-07-03 ENCOUNTER — TELEPHONE (OUTPATIENT)
Dept: FAMILY MEDICINE CLINIC | Facility: CLINIC | Age: 39
End: 2019-07-03

## 2019-07-03 LAB
IGA SERPL-MCNC: 206 MG/DL (ref 47–310)
RHEUMATOID FACT SERPL-ACNC: <14 IU/ML
TTG IGA SER-ACNC: 1 U/ML

## 2019-07-03 NOTE — TELEPHONE ENCOUNTER
----- Message from [id number] sent at 7/3/2019  2:07 PM EDT -----  Please let patient know her blood work for celiac disease is negative

## 2019-07-17 ENCOUNTER — TELEPHONE (OUTPATIENT)
Dept: OBGYN CLINIC | Facility: CLINIC | Age: 39
End: 2019-07-17

## 2019-07-17 NOTE — TELEPHONE ENCOUNTER
Patient has annual scheduled for 08/27/2019 with Dr Alonso Lopez  Would like her benefits ran to remove her Mirena - please call her to schedule when complete

## 2019-08-27 ENCOUNTER — ANNUAL EXAM (OUTPATIENT)
Dept: OBGYN CLINIC | Facility: CLINIC | Age: 39
End: 2019-08-27
Payer: COMMERCIAL

## 2019-08-27 VITALS
HEIGHT: 65 IN | SYSTOLIC BLOOD PRESSURE: 114 MMHG | WEIGHT: 198.8 LBS | BODY MASS INDEX: 33.12 KG/M2 | DIASTOLIC BLOOD PRESSURE: 68 MMHG

## 2019-08-27 DIAGNOSIS — Z30.431 CONTRACEPTIVE, SURVEILLANCE, INTRAUTERINE DEVICE: ICD-10-CM

## 2019-08-27 DIAGNOSIS — E66.9 OBESITY (BMI 30.0-34.9): ICD-10-CM

## 2019-08-27 DIAGNOSIS — Z01.419 ENCOUNTER FOR ANNUAL ROUTINE GYNECOLOGICAL EXAMINATION: Primary | ICD-10-CM

## 2019-08-27 DIAGNOSIS — R87.810 ASCUS WITH POSITIVE HIGH RISK HPV CERVICAL: ICD-10-CM

## 2019-08-27 DIAGNOSIS — E58 DIETARY CALCIUM DEFICIENCY: ICD-10-CM

## 2019-08-27 DIAGNOSIS — R87.610 ASCUS WITH POSITIVE HIGH RISK HPV CERVICAL: ICD-10-CM

## 2019-08-27 DIAGNOSIS — Z12.4 PAP SMEAR FOR CERVICAL CANCER SCREENING: ICD-10-CM

## 2019-08-27 DIAGNOSIS — Z72.3 INADEQUATE EXERCISE: ICD-10-CM

## 2019-08-27 PROCEDURE — S0612 ANNUAL GYNECOLOGICAL EXAMINA: HCPCS | Performed by: OBSTETRICS & GYNECOLOGY

## 2019-08-27 NOTE — PROGRESS NOTES
Pt is a 45 y o   with No LMP recorded (lmp unknown)  using Mirena for [facility] presents for preventive care  She notes the same partner since her last STI evaluation  In her lifetime she has been involved with >5 partners   Safe sexual practices (monogomy, condoms) are followed consistently  · She does  feel safe in the relationship  She does feel safe in her home  · Her calcium intake encompasses cheese for a total of 2-3 servings daily on average  She does take additional Vitamin D (MVI or supplement)  · She exercises 1-2 times per week  · Her menses consist of irregular spotting since placement of Mirena  Menstrual History:  OB History        2    Para   1    Term   1            AB   1    Living   1       SAB        TAB   1    Ectopic        Multiple        Live Births               Obstetric Comments   Menarche: 12   x 1    Menses: irregular spotting with Mirena              Menarche age: 15  No LMP recorded (lmp unknown)  ·      · She has never recieved an HPV vaccine    · tobacco use : does not use tobacco              · Colonoscopy: not indicated  · Pap: 2018-ASCUS +HRHPV; colpo  2018 wnl, repeat collected today  · Mammogram: not indicated  · Mirena placed 2018, due for removal     Past Medical History:   Diagnosis Date    Abnormal Pap smear of cervix     2017-wnl, HPV + (18/45), colpo; 2018-ASCUS + HPV: -colpo squamous atypia; 2019    Attention disturbance     last assessed: 2017    BV (bacterial vaginosis)     Fibroid     Herpes     HPV (human papilloma virus) infection     Need for prophylactic vaccination against human papillomavirus (HPV) types 6, 11, 16, and 18     declines    Varicella        Past Surgical History:   Procedure Laterality Date    COLONOSCOPY  2019    wnl    DILATION AND CURETTAGE, DIAGNOSTIC / THERAPEUTIC      ETOP       OB History    Para Term  AB Living   2 1 1   1 1   SAB TAB Ectopic Multiple Live Births     1            # Outcome Date GA Lbr Edis/2nd Weight Sex Delivery Anes PTL Lv   2 TAB            1 Term               Obstetric Comments   Menarche: 12    x 1      Menses: irregular spotting with Mirena       Gyn HX:  abnormal pap       Current Outpatient Medications:     albuterol (PROVENTIL HFA) 90 mcg/act inhaler, Inhale 2 puffs every 6 (six) hours as needed (cough, chest tightness, shortness of breath), Disp: 18 g, Rfl: 0    ergocalciferol (VITAMIN D2) 50,000 units, Take 1 capsule (50,000 Units total) by mouth once a week for 12 doses, Disp: 12 capsule, Rfl: 0    levonorgestrel (MIRENA) 20 MCG/24HR IUD, 1 each by Intrauterine route once, Disp: , Rfl:     Allergies   Allergen Reactions    Amoxicillin Rash     Category: Allergy;        Social History     Socioeconomic History    Marital status: Single     Spouse name: None    Number of children: 1    Years of education: associates degree; 2 yr college    Highest education level: None   Occupational History    Occupation: Billing   Social Needs    Financial resource strain: None    Food insecurity:     Worry: None     Inability: None    Transportation needs:     Medical: None     Non-medical: None   Tobacco Use    Smoking status: Never Smoker    Smokeless tobacco: Never Used   Substance and Sexual Activity    Alcohol use:  Yes     Alcohol/week: 1 0 standard drinks     Types: 1 Glasses of wine per week     Frequency: 2-4 times a month     Drinks per session: 1 or 2     Comment: occasional alcohol use    Drug use: No    Sexual activity: Yes     Partners: Male     Birth control/protection: IUD     Comment: lifetime parnters: 13; current partner 2017   Lifestyle    Physical activity:     Days per week: None     Minutes per session: None    Stress: None   Relationships    Social connections:     Talks on phone: None     Gets together: None     Attends Cheondoism service: None     Active member of club or organization: None     Attends meetings of clubs or organizations: None     Relationship status: None    Intimate partner violence:     Fear of current or ex partner: None     Emotionally abused: None     Physically abused: None     Forced sexual activity: None   Other Topics Concern    None   Social History Narrative    Yarsani: no preference    Accepts blood products        Exercise: 1-2x/week    Calcium: 2-3 servings of cheese daily       Family History   Problem Relation Age of Onset    No Known Problems Mother     No Known Problems Father     No Known Problems Sister     Alzheimer's disease Maternal Grandmother     Hypertension Maternal Grandfather     Cancer Paternal Grandmother         unsure what type    Pancreatic cancer Paternal Grandmother     No Known Problems Daughter     Sjogren's syndrome Other         sjogren's disease    Breast cancer Neg Hx     Ovarian cancer Neg Hx     Colon cancer Neg Hx        Blood pressure 114/68, height 5' 4 5" (1 638 m), weight 90 2 kg (198 lb 12 8 oz), not currently breastfeeding  and Body mass index is 33 6 kg/m²  Physical Exam   Constitutional: She is oriented to person, place, and time  She appears well-developed and well-nourished  HENT:   Head: Normocephalic and atraumatic  Eyes: Conjunctivae and EOM are normal    Neck: Normal range of motion  Neck supple  No tracheal deviation present  No thyromegaly present  Cardiovascular: Normal rate, regular rhythm and normal heart sounds  Pulmonary/Chest: Effort normal and breath sounds normal  No stridor  No respiratory distress  She has no wheezes  She has no rales  Abdominal: Soft  Bowel sounds are normal  She exhibits no distension and no mass  There is no tenderness  There is no rebound and no guarding  Musculoskeletal: Normal range of motion  She exhibits no edema or tenderness  Lymphadenopathy:     She has no cervical adenopathy  Neurological: She is alert and oriented to person, place, and time  Skin: Skin is warm  No rash noted  No erythema  Psychiatric: She has a normal mood and affect  Her behavior is normal  Judgment and thought content normal      Breasts: breasts appear normal, no suspicious masses, no skin or nipple changes or axillary nodes, symmetric fibrous changes in both upper outer quadrants  vulva: normal external genitalia for age and no lesions, masses, epithelial changes, or exudate  vagina: color pink and rugae  well formed rugae  cervix: parous, endocervical polyp,  IUD string  2 cm and pap obtained  uterus: NSSC, AF, NT, mobile  adnexa: no masses or tenderness      A/P:  Pt is a 45 y o   with      Diagnoses and all orders for this visit:    Encounter for annual routine gynecological examination    ASCUS with positive high risk HPV cervical  -     Thinprep Tis Pap and HPV mRNA E6/E7 Reflex HPV 16,18/45    Pap smear for cervical cancer screening  -     Thinprep Tis Pap and HPV mRNA E6/E7 Reflex HPV 16,18/45    Contraceptive, surveillance, intrauterine device    Dietary calcium deficiency    Inadequate exercise    Obesity (BMI 30 0-34  9)      Patient advised recommendation of daily dietary calcium of  1000 mg calcium  Patient advised recommendation of exercise 5 times per week for 30 minutes  Patient advised recommendation of BMI to be between 19-25

## 2019-08-30 LAB
CLINICAL INFO: NORMAL
CYTO CVX: NORMAL
CYTOLOGY CMNT CVX/VAG CYTO-IMP: NORMAL
DATE PREVIOUS BX: NORMAL
HPV E6+E7 MRNA CVX QL NAA+PROBE: NOT DETECTED
LMP START DATE: NORMAL
MICROORGANISM CVX/VAG CYTO: NORMAL
SL AMB PREV. PAP:: NORMAL
SPECIMEN SOURCE CVX/VAG CYTO: NORMAL

## 2019-09-03 ENCOUNTER — TELEPHONE (OUTPATIENT)
Dept: OBGYN CLINIC | Facility: CLINIC | Age: 39
End: 2019-09-03

## 2019-09-03 NOTE — TELEPHONE ENCOUNTER
----- Message from Carloz Ariza MD sent at 9/1/2019  9:05 PM EDT -----  Please notify the patient that her pap was normal and her HPV is negative  This is is improved from last year  I year watch file and if negative will return to routine screening  Also please notify the patient her pap shows possible yeast infection  If she is symptomatic she should come in for evaluation

## 2019-09-09 DIAGNOSIS — E55.9 VITAMIN D DEFICIENCY: ICD-10-CM

## 2019-09-10 DIAGNOSIS — E55.9 VITAMIN D DEFICIENCY: Primary | ICD-10-CM

## 2019-09-10 RX ORDER — ERGOCALCIFEROL 1.25 MG/1
50000 CAPSULE ORAL WEEKLY
Qty: 12 CAPSULE | Refills: 0 | OUTPATIENT
Start: 2019-09-10 | End: 2019-11-27

## 2019-09-10 NOTE — TELEPHONE ENCOUNTER
Please have patient complete blood work for vitamin D level, once she completes her prescription vitamin d       TY

## 2019-10-28 ENCOUNTER — TELEPHONE (OUTPATIENT)
Dept: FAMILY MEDICINE CLINIC | Facility: CLINIC | Age: 39
End: 2019-10-28

## 2019-10-28 NOTE — TELEPHONE ENCOUNTER
Patient states she is starting to fell like she was before when her Vitamin D was low   Wants to know if you can refill this or do she need to do the blood work 1st

## 2019-10-28 NOTE — TELEPHONE ENCOUNTER
There is already an order in Epic for her to repeat her vitamin D level  Please reprint the slip for her to check her vitamin D level

## 2019-10-29 ENCOUNTER — LAB (OUTPATIENT)
Dept: LAB | Facility: CLINIC | Age: 39
End: 2019-10-29
Payer: COMMERCIAL

## 2019-10-29 DIAGNOSIS — E55.9 VITAMIN D DEFICIENCY, UNSPECIFIED: Primary | ICD-10-CM

## 2019-10-29 LAB — 25(OH)D3 SERPL-MCNC: 29.3 NG/ML (ref 30–100)

## 2019-10-29 PROCEDURE — 82306 VITAMIN D 25 HYDROXY: CPT

## 2019-10-29 PROCEDURE — 36415 COLL VENOUS BLD VENIPUNCTURE: CPT

## 2019-10-31 ENCOUNTER — TELEPHONE (OUTPATIENT)
Dept: FAMILY MEDICINE CLINIC | Facility: CLINIC | Age: 39
End: 2019-10-31

## 2019-10-31 NOTE — RESULT ENCOUNTER NOTE
Please let patient know vitamin D level is just below normal at 29 3  This should be around 30-40  Please take OTC vitamin D3, 2000 units daily

## 2019-10-31 NOTE — TELEPHONE ENCOUNTER
----- Message from [id number] Gurjit Elias sent at 10/30/2019  9:30 PM EDT -----  Please let patient know vitamin D level is just below normal at 29 3  This should be around 30-40  Please take OTC vitamin D3, 2000 units daily

## 2019-11-06 ENCOUNTER — PROCEDURE VISIT (OUTPATIENT)
Dept: OBGYN CLINIC | Facility: CLINIC | Age: 39
End: 2019-11-06
Payer: COMMERCIAL

## 2019-11-06 VITALS — SYSTOLIC BLOOD PRESSURE: 118 MMHG | BODY MASS INDEX: 34.85 KG/M2 | DIASTOLIC BLOOD PRESSURE: 76 MMHG | WEIGHT: 206.2 LBS

## 2019-11-06 DIAGNOSIS — F52.0 LACK OF LIBIDO: ICD-10-CM

## 2019-11-06 DIAGNOSIS — Z30.432 ENCOUNTER FOR IUD REMOVAL: ICD-10-CM

## 2019-11-06 DIAGNOSIS — R63.5 WEIGHT GAIN: ICD-10-CM

## 2019-11-06 DIAGNOSIS — N92.6 IRREGULAR MENSTRUAL BLEEDING: Primary | ICD-10-CM

## 2019-11-06 PROCEDURE — 58301 REMOVE INTRAUTERINE DEVICE: CPT | Performed by: NURSE PRACTITIONER

## 2019-11-06 PROCEDURE — 99213 OFFICE O/P EST LOW 20 MIN: CPT | Performed by: NURSE PRACTITIONER

## 2019-11-06 NOTE — PROGRESS NOTES
Assessment/Plan:    1  Irregular menstrual bleeding  Discussed nature of Mirena IUD in relation to her symptom complaints  Explained that except for bleeding irregularity, I cannot say that the IUD is the cause of her weight gain or lack of sex drive  Her recent lab work was normal, including TSH  Vit D was low and she has resumed supplement  Explored other causes of libido change and there do not seem to be other sources  She is dieting; suggested regular exercise  After discussion, IUD was removed per patient's request   Harris Javad to give six months before reassessing her symptoms  Chooses to use condoms for birth control at this time  RV as needed  2  Lack of libido  As above    3  Weight gain  As above    4  Encounter for IUD removal  IUD removed with ease      Subjective:      Patient ID: Klaus Das is a 45 y o  female  HPI  PROBLEM VISIT  CC: contraceptive management    2/2018 Mirena IUD inserted    Feels like she is bleeding more frequently; breast tenderness for 3 weeks; has gained a lot of weight  "Zero sex drive" and very dry down there  Denies any relationship conflict  Not taking medications that affect libido or weight  Has dieted and lost 13 lbs but "is stuck there"  Had full set of lab work drawn 6/2019, normal, including TSH; vit D very low, started supplement  The following portions of the patient's history were reviewed and updated as appropriate: allergies, current medications, past family history, past medical history, past social history, past surgical history and problem list     Review of Systems   Constitutional: Positive for fatigue  Negative for chills and fever  Genitourinary: Positive for menstrual problem  Negative for difficulty urinating, dysuria, frequency, genital sores, pelvic pain, urgency, vaginal bleeding and vaginal discharge          Lack of sexual desire         Objective:    /76 (BP Location: Left arm, Patient Position: Standing, Cuff Size: Adult)   Wt 93 5 kg (206 lb 3 2 oz)   BMI 34 85 kg/m²        Physical Exam   Constitutional: She is oriented to person, place, and time  She appears well-developed and well-nourished  No distress  HENT:   Head: Normocephalic and atraumatic  Eyes: Pupils are equal, round, and reactive to light  Pulmonary/Chest: Effort normal    Genitourinary: Pelvic exam was performed with patient supine  There is no rash, tenderness, lesion or injury on the right labia  There is no rash, tenderness, lesion or injury on the left labia  Cervix exhibits no motion tenderness, no discharge and no friability  No erythema, tenderness or bleeding in the vagina  No signs of injury around the vagina  No vaginal discharge found  Genitourinary Comments: IUD strings visualized   Neurological: She is alert and oriented to person, place, and time  Skin: Skin is warm and dry  Psychiatric: She has a normal mood and affect   Her behavior is normal  Thought content normal        Iud removal  Date/Time: 11/6/2019 9:07 AM  Performed by: QUE Harrison  Authorized by: QUE Harrison     Consent:     Consent obtained:  Written    Consent given by:  Patient    Procedure risks and benefits discussed: yes      Patient questions answered: yes      Patient agrees, verbalizes understanding, and wants to proceed: yes      Educational handouts given: yes      Instructions and paperwork completed: yes    Procedure:     Removed with no complications: yes      Removal due to mechanical complications of IUD: no      Removal due to infection and inflammatory reaction: no      Other reason for removal:  Weight gain, irregular bleeding, no sex drive

## 2019-12-19 ENCOUNTER — OFFICE VISIT (OUTPATIENT)
Dept: FAMILY MEDICINE CLINIC | Facility: CLINIC | Age: 39
End: 2019-12-19
Payer: COMMERCIAL

## 2019-12-19 VITALS
HEIGHT: 65 IN | DIASTOLIC BLOOD PRESSURE: 82 MMHG | BODY MASS INDEX: 35.45 KG/M2 | OXYGEN SATURATION: 97 % | WEIGHT: 212.8 LBS | TEMPERATURE: 97.8 F | RESPIRATION RATE: 18 BRPM | HEART RATE: 71 BPM | SYSTOLIC BLOOD PRESSURE: 126 MMHG

## 2019-12-19 DIAGNOSIS — L21.9 SEBORRHEA: Primary | ICD-10-CM

## 2019-12-19 PROCEDURE — 99213 OFFICE O/P EST LOW 20 MIN: CPT | Performed by: FAMILY MEDICINE

## 2019-12-19 RX ORDER — CLOBETASOL PROPIONATE 0.46 MG/ML
SOLUTION TOPICAL
Qty: 50 ML | Refills: 1 | Status: SHIPPED | OUTPATIENT
Start: 2019-12-19 | End: 2020-02-13 | Stop reason: ALTCHOICE

## 2019-12-23 PROBLEM — L21.9 SEBORRHEA: Status: ACTIVE | Noted: 2019-12-23

## 2019-12-23 NOTE — PROGRESS NOTES
FAMILY PRACTICE OFFICE VISIT       NAME: Maryam Longoria  AGE: 44 y o  SEX: female       : 1980        MRN: [id number]    DATE: 2019  TIME: 7:44 AM    Assessment and Plan     Problem List Items Addressed This Visit        Musculoskeletal and Integument    Seborrhea - Primary     Seborrhea  Patient given prescription for Temovate solution to apply 3 times a week to the affected area  Patient will call if symptoms persist   If symptoms improve she may continue to wean off medication as directed  I suspect patient has a a reactive lymph node related to her condition and intense itching  She will observe for any interval changes         Relevant Medications    clobetasol (TEMOVATE) 0 05 % external solution              Chief Complaint     Chief Complaint   Patient presents with    Neck Pain     lump on left side of her neck       History of Present Illness     Patient noticed asymptomatic nodule on left posterior neck area  Patient reported unrelated history of itching and dryness of her scalp along occiput area  She denies any recent illness  Review of Systems   Review of Systems   Constitutional: Negative  Skin:        As per HPI       Active Problem List     Patient Active Problem List   Diagnosis    Dietary calcium deficiency    Generalized anxiety disorder    [place]    Pap smear abnormality of cervix/human papillomavirus (HPV) positive    PMS (premenstrual syndrome)    Bloating    Contraceptive, surveillance, intrauterine device    Fibroids, intramural    Encounter for annual routine gynecological examination    Pap smear for cervical cancer screening    Inadequate exercise    Obesity (BMI 30 0-34  9)    Other fatigue    Vitamin D deficiency    ASCUS with positive high risk HPV cervical    Seborrhea       Past Medical History:  Past Medical History:   Diagnosis Date    Abnormal Pap smear of cervix     2017-wnl, HPV + (18/45), colpo; 2018-ASCUS + HPV: 9/18-colpo squamous atypia; 8/2019: wnl, HPV neg    Attention disturbance     last assessed: 8/1/2017    BV (bacterial vaginosis)     Fibroid     Herpes     HPV (human papilloma virus) infection     Need for prophylactic vaccination against human papillomavirus (HPV) types 6, 11, 16, and 18     declines    Varicella        Past Surgical History:  Past Surgical History:   Procedure Laterality Date    COLONOSCOPY  06/2019    wnl    DILATION AND CURETTAGE, DIAGNOSTIC / THERAPEUTIC      ETOP       Family History:  Family History   Problem Relation Age of Onset    No Known Problems Mother     No Known Problems Father     No Known Problems Sister     Alzheimer's disease Maternal Grandmother     Hypertension Maternal Grandfather     Cancer Paternal Grandmother         unsure what type    Pancreatic cancer Paternal Grandmother     No Known Problems Daughter     Sjogren's syndrome Other         sjogren's disease    Breast cancer Neg Hx     Ovarian cancer Neg Hx     Colon cancer Neg Hx        Social History:  Social History     Socioeconomic History    Marital status: Single     Spouse name: Not on file    Number of children: 1    Years of education: associates degree; 2 yr college    Highest education level: Not on file   Occupational History    Occupation: Billing   Social Needs    Financial resource strain: Not on file    Food insecurity:     Worry: Not on file     Inability: Not on file    Transportation needs:     Medical: Not on file     Non-medical: Not on file   Tobacco Use    Smoking status: Never Smoker    Smokeless tobacco: Never Used   Substance and Sexual Activity    Alcohol use:  Yes     Alcohol/week: 1 0 standard drinks     Types: 1 Glasses of wine per week     Frequency: 2-4 times a month     Drinks per session: 1 or 2     Comment: occasional alcohol use    Drug use: No    Sexual activity: Yes     Partners: Male     Birth control/protection: IUD     Comment: lifetime parnters: 13; current partner 2017   Lifestyle    Physical activity:     Days per week: Not on file     Minutes per session: Not on file    Stress: Not on file   Relationships    Social connections:     Talks on phone: Not on file     Gets together: Not on file     Attends Mormon service: Not on file     Active member of club or organization: Not on file     Attends meetings of clubs or organizations: Not on file     Relationship status: Not on file    Intimate partner violence:     Fear of current or ex partner: Not on file     Emotionally abused: Not on file     Physically abused: Not on file     Forced sexual activity: Not on file   Other Topics Concern    Not on file   Social History Narrative    Mandaen: no preference    Accepts blood products        Exercise: 1-2x/week    Calcium: 2-3 servings of cheese daily       Objective     Vitals:    12/19/19 1634   BP: 126/82   Pulse: 71   Resp: 18   Temp: 97 8 °F (36 6 °C)   SpO2: 97%     Wt Readings from Last 3 Encounters:   12/19/19 96 5 kg (212 lb 12 8 oz)   11/06/19 93 5 kg (206 lb 3 2 oz)   08/27/19 90 2 kg (198 lb 12 8 oz)       Physical Exam   Constitutional: No distress  Skin:   Patient has significant scaly skin with small pruritic papules along occiput area of her scalp  Patient also has a small skin colored nodule along left neck posterior to SCM    It is not red nor is it tender to palpation       Pertinent Laboratory/Diagnostic Studies:  Lab Results   Component Value Date    BUN 15 06/21/2019    CREATININE 0 75 06/21/2019    CALCIUM 9 7 06/21/2019    K 4 1 06/21/2019    CO2 29 06/21/2019     06/21/2019     Lab Results   Component Value Date    ALT 14 06/21/2019    AST 14 06/21/2019    ALKPHOS 71 06/21/2019       Lab Results   Component Value Date    WBC 7 4 06/21/2019    HGB 14 2 06/21/2019    HCT 42 4 06/21/2019    MCV 95 5 06/21/2019     06/21/2019       Lab Results   Component Value Date    TSH 1 83 06/21/2019       No results found for: CHOL  No results found for: TRIG  No results found for: HDL  No results found for: LDLCALC  No results found for: HGBA1C    Results for orders placed or performed in visit on 10/29/19   Vitamin D 25 hydroxy   Result Value Ref Range    Vit D, 25-Hydroxy 29 3 (L) 30 0 - 100 0 ng/mL       No orders of the defined types were placed in this encounter  ALLERGIES:  Allergies   Allergen Reactions    Amoxicillin Rash     Category: Allergy;        Current Medications     Current Outpatient Medications   Medication Sig Dispense Refill    albuterol (PROVENTIL HFA) 90 mcg/act inhaler Inhale 2 puffs every 6 (six) hours as needed (cough, chest tightness, shortness of breath) 18 g 0    clobetasol (TEMOVATE) 0 05 % external solution Use 3 X a week as directed 50 mL 1    ergocalciferol (VITAMIN D2) 50,000 units Take 1 capsule (50,000 Units total) by mouth once a week for 12 doses 12 capsule 0     No current facility-administered medications for this visit  Health Maintenance     Health Maintenance   Topic Date Due    HIV Screening  12/01/1995    BMI: Followup Plan  12/01/1998    DTaP,Tdap,and Td Vaccines (1 - Tdap) 06/20/2020 (Originally 12/1/1991)    Influenza Vaccine  12/19/2020 (Originally 7/1/2019)    Depression Screening PHQ  06/21/2020    BMI: Adult  12/19/2020    Cervical Cancer Screening  08/27/2024    CRC Screening: Colonoscopy  07/01/2029    Pneumococcal Vaccine: 65+ Years (1 of 2 - PCV13) 12/01/2045    Pneumococcal Vaccine: Pediatrics (0 to 5 Years) and At-Risk Patients (6 to 59 Years)  Aged Out    HIB Vaccine  Aged Out    Hepatitis B Vaccine  Aged Out    IPV Vaccine  Aged Out    Hepatitis A Vaccine  Aged Out    Meningococcal ACWY Vaccine  Aged Out    HPV Vaccine  Aged Out       There is no immunization history on file for this patient      Carmen Perez MD

## 2019-12-23 NOTE — ASSESSMENT & PLAN NOTE
Seborrhea  Patient given prescription for Temovate solution to apply 3 times a week to the affected area  Patient will call if symptoms persist   If symptoms improve she may continue to wean off medication as directed  I suspect patient has a a reactive lymph node related to her condition and intense itching    She will observe for any interval changes

## 2020-02-13 ENCOUNTER — OFFICE VISIT (OUTPATIENT)
Dept: FAMILY MEDICINE CLINIC | Facility: CLINIC | Age: 40
End: 2020-02-13
Payer: COMMERCIAL

## 2020-02-13 VITALS
RESPIRATION RATE: 16 BRPM | DIASTOLIC BLOOD PRESSURE: 80 MMHG | BODY MASS INDEX: 34.42 KG/M2 | SYSTOLIC BLOOD PRESSURE: 100 MMHG | TEMPERATURE: 97.9 F | HEART RATE: 80 BPM | HEIGHT: 65 IN | WEIGHT: 206.6 LBS

## 2020-02-13 DIAGNOSIS — R51.9 FREQUENT HEADACHES: ICD-10-CM

## 2020-02-13 DIAGNOSIS — M54.2 NECK PAIN: Primary | ICD-10-CM

## 2020-02-13 PROCEDURE — 99213 OFFICE O/P EST LOW 20 MIN: CPT | Performed by: NURSE PRACTITIONER

## 2020-02-13 PROCEDURE — 1036F TOBACCO NON-USER: CPT | Performed by: NURSE PRACTITIONER

## 2020-02-13 NOTE — PROGRESS NOTES
FAMILY PRACTICE OFFICE VISIT       NAME: Marixa Moore  AGE: 44 y o  SEX: female       : 1980        MRN: [id number]    Assessment and Plan     Problem List Items Addressed This Visit     None      Visit Diagnoses     Neck pain    -  Primary    Relevant Orders    Ambulatory referral to Physical Therapy    CBC and differential (Completed)    Comprehensive metabolic panel (Completed)    TSH, 3rd generation (Completed)    C-reactive protein (Completed)    Sedimentation rate, automated (Completed)    Lyme Antibody Profile with reflex to WB (Completed)    Magnesium (Completed)    Iron Panel (Includes Ferritin, Iron Sat%, Iron, and TIBC)    Frequent headaches        Relevant Orders    Ambulatory referral to Physical Therapy    CBC and differential (Completed)    Comprehensive metabolic panel (Completed)    TSH, 3rd generation (Completed)    C-reactive protein (Completed)    Sedimentation rate, automated (Completed)    Lyme Antibody Profile with reflex to WB (Completed)    Magnesium (Completed)    Iron Panel (Includes Ferritin, Iron Sat%, Iron, and TIBC)        1  Neck pain  Ambulatory referral to Physical Therapy    CBC and differential    Comprehensive metabolic panel    TSH, 3rd generation    C-reactive protein    Sedimentation rate, automated    Lyme Antibody Profile with reflex to WB    Magnesium    Iron Panel (Includes Ferritin, Iron Sat%, Iron, and TIBC)    CBC and differential    Comprehensive metabolic panel    TSH, 3rd generation    C-reactive protein    Sedimentation rate, automated    Lyme Antibody Profile with reflex to WB    Magnesium   2   Frequent headaches  Ambulatory referral to Physical Therapy    CBC and differential    Comprehensive metabolic panel    TSH, 3rd generation    C-reactive protein    Sedimentation rate, automated    Lyme Antibody Profile with reflex to WB    Magnesium    Iron Panel (Includes Ferritin, Iron Sat%, Iron, and TIBC)    CBC and differential    Comprehensive metabolic panel    TSH, 3rd generation    C-reactive protein    Sedimentation rate, automated    Lyme Antibody Profile with reflex to WB    Magnesium     This 44year old female presents today for mild headaches described as aching, pressure  Have been occurring daily for the past few weeks  No red flags  Also has chronic neck pain, and muscle tension  Suspect headaches may be caused by neck pain, will check blood work as noted for other causes  Will have patient try physical therapy for neck pain and headaches, if no improvement in symptoms will consider imaging and neurology referral  She will follow up in office in 3-4 weeks for recheck  Chief Complaint     Chief Complaint   Patient presents with    Headache     Pt is here for daily headaches, dizziness and pressure 3 + wks       History of Present Illness     Cecilio Mendoza is a 40-year-old female presenting today for headaches  Headaches have been present daily for the past few weeks  Described as mild pressure, annoying, but not severe  Recently had an eye exam   Notes her prescription changed slightly, will be getting new glasses  Glasses have been ordered, she is waiting for them to come  Headaches are located under her eyes, sometimes parietal region  Not always in the same spot described as pressure in aching  No photophobia  She does work on the computer all day  Mild nausea comes and goes  Headaches, any time of day  Last anywhere from a few hours to all day  Improved with Tylenol  Nothing makes her headaches worse  She does not wake up from sleep from headaches  Has had no vision changes  Caffeine intake:  Was drinking coffee daily, has cut back but does not notice a difference in headaches  Started weight watchers 3 weeks ago  No meal skipping  Feels like she is eating enough, and making better choices  Drinking 3-4 L of water per day      Is not under any more stress than usual   Sleeping well, about 7 hours per night   Generally goes to bed at the same time wakes up at the same time  Has chronic neck pain, described as stiffness  It is always present bothersome sometimes worse than others  She does get a muscle cramping right trapezius region, that is so bad sometimes she can turn her head  She does not sleep with any type of pillow, because pillows hurt her neck  She has never been to physical therapy for this, has just been dealing with it over the years  Sometimes feels like if she presses on her neck, her headaches feel better  She does get dizzy for few seconds briefly when standing up too quickly,     She has pets, no known tick bite  Review of Systems   Review of Systems   Constitutional: Negative  HENT: Negative  Eyes: Negative  Respiratory: Negative  Cardiovascular: Negative  Gastrointestinal: Negative  Endocrine: Negative  Genitourinary: Negative  Musculoskeletal: Negative  Skin: Negative  Allergic/Immunologic: Negative  Neurological: Positive for dizziness (as noted in HPI) and headaches (as noted in HPI)  Hematological: Negative  Psychiatric/Behavioral: Negative  Active Problem List     Patient Active Problem List   Diagnosis    Dietary calcium deficiency    Generalized anxiety disorder    [place]    Pap smear abnormality of cervix/human papillomavirus (HPV) positive    PMS (premenstrual syndrome)    Bloating    Contraceptive, surveillance, intrauterine device    Fibroids, intramural    Encounter for annual routine gynecological examination    Pap smear for cervical cancer screening    Inadequate exercise    Obesity (BMI 30 0-34  9)    Other fatigue    Vitamin D deficiency    ASCUS with positive high risk HPV cervical    Seborrhea       Past Medical History:  Past Medical History:   Diagnosis Date    Abnormal Pap smear of cervix     4/2017-wnl, HPV + (18/45), colpo; 8/2018-ASCUS + HPV: 9/18-colpo squamous atypia; 8/2019: wnl, HPV neg    Attention disturbance     last assessed: 8/1/2017    BV (bacterial vaginosis)     Fibroid     Herpes     HPV (human papilloma virus) infection     Need for prophylactic vaccination against human papillomavirus (HPV) types 6, 11, 16, and 18     declines    Varicella        Past Surgical History:  Past Surgical History:   Procedure Laterality Date    COLONOSCOPY  06/2019    wnl    DILATION AND CURETTAGE, DIAGNOSTIC / THERAPEUTIC      ETOP       Family History:  Family History   Problem Relation Age of Onset    No Known Problems Mother     No Known Problems Father     No Known Problems Sister     Alzheimer's disease Maternal Grandmother     Hypertension Maternal Grandfather     Cancer Paternal Grandmother         unsure what type    Pancreatic cancer Paternal Grandmother     No Known Problems Daughter     Sjogren's syndrome Other         sjogren's disease    Breast cancer Neg Hx     Ovarian cancer Neg Hx     Colon cancer Neg Hx        Social History:  Social History     Socioeconomic History    Marital status: Single     Spouse name: Not on file    Number of children: 1    Years of education: associates degree; 2 yr college    Highest education level: Not on file   Occupational History    Occupation: Billing   Social Needs    Financial resource strain: Not on file    Food insecurity:     Worry: Not on file     Inability: Not on file    Transportation needs:     Medical: Not on file     Non-medical: Not on file   Tobacco Use    Smoking status: Never Smoker    Smokeless tobacco: Never Used   Substance and Sexual Activity    Alcohol use:  Yes     Alcohol/week: 1 0 standard drinks     Types: 1 Glasses of wine per week     Frequency: 2-4 times a month     Drinks per session: 1 or 2     Comment: occasional alcohol use    Drug use: No    Sexual activity: Yes     Partners: Male     Birth control/protection: IUD     Comment: lifetime parnters: 13; current partner 2017   Lifestyle  Physical activity:     Days per week: Not on file     Minutes per session: Not on file    Stress: Not on file   Relationships    Social connections:     Talks on phone: Not on file     Gets together: Not on file     Attends Yarsanism service: Not on file     Active member of club or organization: Not on file     Attends meetings of clubs or organizations: Not on file     Relationship status: Not on file    Intimate partner violence:     Fear of current or ex partner: Not on file     Emotionally abused: Not on file     Physically abused: Not on file     Forced sexual activity: Not on file   Other Topics Concern    Not on file   Social History Narrative    Shinto: no preference    Accepts blood products        Exercise: 1-2x/week    Calcium: 2-3 servings of cheese daily       I have reviewed the patient's medical history in detail; there are no changes to the history as noted in the electronic medical record  Objective     Vitals:    02/13/20 0927   BP: 100/80   Pulse: 80   Resp: 16   Temp: 97 9 °F (36 6 °C)   TempSrc: Tympanic   Weight: 93 7 kg (206 lb 9 6 oz)   Height: 5' 4 5" (1 638 m)     Wt Readings from Last 3 Encounters:   02/13/20 93 7 kg (206 lb 9 6 oz)   12/19/19 96 5 kg (212 lb 12 8 oz)   11/06/19 93 5 kg (206 lb 3 2 oz)     Physical Exam   Constitutional: She is oriented to person, place, and time  She appears well-developed and well-nourished  No distress  HENT:   Head: Normocephalic and atraumatic  Right Ear: Tympanic membrane and ear canal normal    Left Ear: Tympanic membrane and ear canal normal    Nose: Nose normal    Mouth/Throat: Uvula is midline and oropharynx is clear and moist    Eyes: Conjunctivae are normal    Neck: Normal range of motion  Neck supple  No thyromegaly present  Cardiovascular: Normal rate, regular rhythm and normal heart sounds  Pulmonary/Chest: Effort normal and breath sounds normal    Lymphadenopathy:     She has no cervical adenopathy     Neurological: She is alert and oriented to person, place, and time  No cranial nerve deficit  Skin: No rash noted  Psychiatric: She has a normal mood and affect  Nursing note and vitals reviewed  ALLERGIES:  Allergies   Allergen Reactions    Amoxicillin Rash     Category: Allergy;        Current Medications     Current Outpatient Medications   Medication Sig Dispense Refill    albuterol (PROVENTIL HFA) 90 mcg/act inhaler Inhale 2 puffs every 6 (six) hours as needed (cough, chest tightness, shortness of breath) 18 g 0    ergocalciferol (VITAMIN D2) 50,000 units Take 1 capsule (50,000 Units total) by mouth once a week for 12 doses 12 capsule 0     No current facility-administered medications for this visit  Health Maintenance     Health Maintenance   Topic Date Due    HIV Screening  12/01/1995    BMI: Followup Plan  12/01/1998    Annual Physical  12/01/1998    DTaP,Tdap,and Td Vaccines (1 - Tdap) 06/20/2020 (Originally 12/1/1991)    Influenza Vaccine  12/19/2020 (Originally 7/1/2019)    Depression Screening PHQ  06/21/2020    BMI: Adult  02/13/2021    Cervical Cancer Screening  08/27/2024    CRC Screening: Colonoscopy  07/01/2029    Pneumococcal Vaccine: 65+ Years (1 of 2 - PCV13) 12/01/2045    Pneumococcal Vaccine: Pediatrics (0 to 5 Years) and At-Risk Patients (6 to 59 Years)  Aged Out    HIB Vaccine  Aged Out    Hepatitis B Vaccine  Aged Out    IPV Vaccine  Aged Out    Hepatitis A Vaccine  Aged Out    Meningococcal ACWY Vaccine  Aged Out    HPV Vaccine  Aged Out       There is no immunization history on file for this patient      Amanda Bliss

## 2020-02-14 LAB
ALBUMIN SERPL-MCNC: 4.5 G/DL (ref 3.6–5.1)
ALBUMIN/GLOB SERPL: 2 (CALC) (ref 1–2.5)
ALP SERPL-CCNC: 60 U/L (ref 31–125)
ALT SERPL-CCNC: 14 U/L (ref 6–29)
AST SERPL-CCNC: 14 U/L (ref 10–30)
B BURGDOR AB SER IA-ACNC: <0.9 INDEX
BASOPHILS # BLD AUTO: 17 CELLS/UL (ref 0–200)
BASOPHILS NFR BLD AUTO: 0.2 %
BILIRUB SERPL-MCNC: 0.6 MG/DL (ref 0.2–1.2)
BUN SERPL-MCNC: 11 MG/DL (ref 7–25)
BUN/CREAT SERPL: NORMAL (CALC) (ref 6–22)
CALCIUM SERPL-MCNC: 9.6 MG/DL (ref 8.6–10.2)
CHLORIDE SERPL-SCNC: 103 MMOL/L (ref 98–110)
CO2 SERPL-SCNC: 27 MMOL/L (ref 20–32)
CREAT SERPL-MCNC: 0.77 MG/DL (ref 0.5–1.1)
CRP SERPL-MCNC: 1.1 MG/L
EOSINOPHIL # BLD AUTO: 0 CELLS/UL (ref 15–500)
EOSINOPHIL NFR BLD AUTO: 0 %
ERYTHROCYTE [DISTWIDTH] IN BLOOD BY AUTOMATED COUNT: 11.8 % (ref 11–15)
ERYTHROCYTE [SEDIMENTATION RATE] IN BLOOD BY WESTERGREN METHOD: 2 MM/H
GLOBULIN SER CALC-MCNC: 2.3 G/DL (CALC) (ref 1.9–3.7)
GLUCOSE SERPL-MCNC: 85 MG/DL (ref 65–99)
HCT VFR BLD AUTO: 42.5 % (ref 35–45)
HGB BLD-MCNC: 14.6 G/DL (ref 11.7–15.5)
IRON SATN MFR SERPL: 41 % (CALC) (ref 16–45)
IRON SERPL-MCNC: 124 MCG/DL (ref 40–190)
LYMPHOCYTES # BLD AUTO: 1766 CELLS/UL (ref 850–3900)
LYMPHOCYTES NFR BLD AUTO: 20.3 %
MAGNESIUM SERPL-MCNC: 2.1 MG/DL (ref 1.5–2.5)
MCH RBC QN AUTO: 32.7 PG (ref 27–33)
MCHC RBC AUTO-ENTMCNC: 34.4 G/DL (ref 32–36)
MCV RBC AUTO: 95.1 FL (ref 80–100)
MONOCYTES # BLD AUTO: 574 CELLS/UL (ref 200–950)
MONOCYTES NFR BLD AUTO: 6.6 %
NEUTROPHILS # BLD AUTO: 6342 CELLS/UL (ref 1500–7800)
NEUTROPHILS NFR BLD AUTO: 72.9 %
PLATELET # BLD AUTO: 292 THOUSAND/UL (ref 140–400)
PMV BLD REES-ECKER: 10.6 FL (ref 7.5–12.5)
POTASSIUM SERPL-SCNC: 4.5 MMOL/L (ref 3.5–5.3)
PROT SERPL-MCNC: 6.8 G/DL (ref 6.1–8.1)
RBC # BLD AUTO: 4.47 MILLION/UL (ref 3.8–5.1)
SL AMB EGFR AFRICAN AMERICAN: 113 ML/MIN/1.73M2
SL AMB EGFR NON AFRICAN AMERICAN: 97 ML/MIN/1.73M2
SODIUM SERPL-SCNC: 137 MMOL/L (ref 135–146)
TIBC SERPL-MCNC: 303 MCG/DL (CALC) (ref 250–450)
TSH SERPL-ACNC: 1.12 MIU/L
WBC # BLD AUTO: 8.7 THOUSAND/UL (ref 3.8–10.8)

## 2020-02-17 ENCOUNTER — TELEPHONE (OUTPATIENT)
Dept: FAMILY MEDICINE CLINIC | Facility: CLINIC | Age: 40
End: 2020-02-17

## 2020-02-17 ENCOUNTER — EVALUATION (OUTPATIENT)
Dept: PHYSICAL THERAPY | Facility: CLINIC | Age: 40
End: 2020-02-17
Payer: COMMERCIAL

## 2020-02-17 DIAGNOSIS — R51.9 FREQUENT HEADACHES: ICD-10-CM

## 2020-02-17 DIAGNOSIS — M54.2 NECK PAIN: Primary | ICD-10-CM

## 2020-02-17 PROCEDURE — 97162 PT EVAL MOD COMPLEX 30 MIN: CPT | Performed by: PHYSICAL THERAPIST

## 2020-02-19 ENCOUNTER — APPOINTMENT (OUTPATIENT)
Dept: PHYSICAL THERAPY | Facility: CLINIC | Age: 40
End: 2020-02-19
Payer: COMMERCIAL

## 2020-02-26 ENCOUNTER — TELEPHONE (OUTPATIENT)
Dept: OTHER | Facility: OTHER | Age: 40
End: 2020-02-26

## 2020-03-04 ENCOUNTER — OFFICE VISIT (OUTPATIENT)
Dept: OBGYN CLINIC | Facility: CLINIC | Age: 40
End: 2020-03-04
Payer: COMMERCIAL

## 2020-03-04 VITALS
BODY MASS INDEX: 34.19 KG/M2 | WEIGHT: 205.2 LBS | HEIGHT: 65 IN | SYSTOLIC BLOOD PRESSURE: 122 MMHG | DIASTOLIC BLOOD PRESSURE: 76 MMHG

## 2020-03-04 DIAGNOSIS — O20.9 BLEEDING IN EARLY PREGNANCY: ICD-10-CM

## 2020-03-04 DIAGNOSIS — Z34.90 UNPLANNED PREGNANCY, UNKNOWN IF WANTED: Primary | ICD-10-CM

## 2020-03-04 PROCEDURE — 99213 OFFICE O/P EST LOW 20 MIN: CPT | Performed by: NURSE PRACTITIONER

## 2020-03-04 PROCEDURE — 1036F TOBACCO NON-USER: CPT | Performed by: NURSE PRACTITIONER

## 2020-03-04 NOTE — PROGRESS NOTES
Assessment/Plan:    1  Unplanned pregnancy, unknown if wanted  Discussion about her options  She would either continue or abort, placing for adoption is not an option  Reassured her that her age, while it confers some added risk to a pregnancy, is not such that it necessitates termination  Advised that we first need to date the gestation/ check viability  Given lab order for quant HCG, type and screen and pelvic US  Once results are back will advise her further  She will require Rhogam regardless of her decision  2  Bleeding in early pregnancy  As above    - hCG, quantitative; Future  - Type and screen; Future  - US pelvis complete non OB; Future      Subjective:      Patient ID: Trixie Jaimes is a 44 y o  female  HPI  PROBLEM VISIT  CC: pregnancy counseling    Madi Jaeger is a 45 yo  presenting to discuss unintended pregnancy  She was last seen by me in early 2019 for removal of her Mirena IUD due to symptoms of weight gain and lack of libido  IUD was removed per her request   She wanted to remain off hormones for several months to see if her symptoms resolved  She decided to use condoms for birth control  She recalls having a spontaneous period during  and at the end of 2020  Has not had a period since  She did a home pregnancy test which was positive  Patient is teary, expressing that she and her partner are undecided as to "what to do"  She has concerns about her age  Of note she is having some vaginal staining/ spotting and is Rh negative  The following portions of the patient's history were reviewed and updated as appropriate: allergies, current medications, past family history, past medical history, past social history, past surgical history and problem list     Review of Systems   Constitutional: Negative for chills and fever  Genitourinary: Positive for menstrual problem (missed period, pos preg test) and vaginal bleeding (light)   Negative for difficulty urinating, dysuria, frequency, genital sores, pelvic pain, urgency, vaginal discharge and vaginal pain  Objective:    /76 (BP Location: Left arm, Patient Position: Sitting, Cuff Size: Adult)   Ht 5' 4 5" (1 638 m)   Wt 93 1 kg (205 lb 3 2 oz)   BMI 34 68 kg/m²      Physical Exam   Constitutional: She is oriented to person, place, and time  She appears well-developed and well-nourished  No distress  HENT:   Head: Normocephalic and atraumatic  Eyes: Pupils are equal, round, and reactive to light  Pulmonary/Chest: Effort normal    Neurological: She is alert and oriented to person, place, and time  Psychiatric: She has a normal mood and affect   Her behavior is normal  Thought content normal

## 2020-03-05 ENCOUNTER — HOSPITAL ENCOUNTER (OUTPATIENT)
Dept: ULTRASOUND IMAGING | Facility: HOSPITAL | Age: 40
Discharge: HOME | End: 2020-03-05
Attending: NURSE PRACTITIONER
Payer: COMMERCIAL

## 2020-03-05 ENCOUNTER — APPOINTMENT (OUTPATIENT)
Dept: LAB | Facility: HOSPITAL | Age: 40
End: 2020-03-05
Attending: NURSE PRACTITIONER
Payer: COMMERCIAL

## 2020-03-05 DIAGNOSIS — O20.9 BLEEDING IN EARLY PREGNANCY: ICD-10-CM

## 2020-03-05 LAB
ABO GROUP BLD: NORMAL
B-HCG SERPL-ACNC: ABNORMAL MIU/ML
BLD GP AB SCN SERPL QL: NEGATIVE
RH BLD: NEGATIVE
SPECIMEN EXPIRATION DATE: NORMAL

## 2020-03-05 PROCEDURE — 84702 CHORIONIC GONADOTROPIN TEST: CPT

## 2020-03-05 PROCEDURE — 86900 BLOOD TYPING SEROLOGIC ABO: CPT

## 2020-03-05 PROCEDURE — 86850 RBC ANTIBODY SCREEN: CPT

## 2020-03-05 PROCEDURE — 76801 OB US < 14 WKS SINGLE FETUS: CPT

## 2020-03-05 PROCEDURE — 86901 BLOOD TYPING SEROLOGIC RH(D): CPT

## 2020-03-05 PROCEDURE — 36415 COLL VENOUS BLD VENIPUNCTURE: CPT

## 2020-03-06 ENCOUNTER — TELEPHONE (OUTPATIENT)
Dept: OBGYN CLINIC | Facility: CLINIC | Age: 40
End: 2020-03-06

## 2020-03-09 ENCOUNTER — CLINICAL SUPPORT (OUTPATIENT)
Dept: OBGYN CLINIC | Facility: CLINIC | Age: 40
End: 2020-03-09
Payer: COMMERCIAL

## 2020-03-09 DIAGNOSIS — O20.9 BLEEDING IN EARLY PREGNANCY: Primary | ICD-10-CM

## 2020-03-09 PROCEDURE — 96372 THER/PROPH/DIAG INJ SC/IM: CPT | Performed by: OBSTETRICS & GYNECOLOGY

## 2020-03-09 NOTE — PROGRESS NOTES
Patient presents for Rhogam, administered in R deltoid  Patient tolerated well    Francisco Funes MA   Patient was given injection on 3/9/2020 and this was not signed until 3/10/2020    Francisco Funes MA

## 2020-03-10 ENCOUNTER — TELEPHONE (OUTPATIENT)
Dept: OBGYN CLINIC | Facility: CLINIC | Age: 40
End: 2020-03-10

## 2020-03-10 ENCOUNTER — APPOINTMENT (OUTPATIENT)
Dept: LAB | Facility: HOSPITAL | Age: 40
End: 2020-03-10
Attending: NURSE PRACTITIONER
Payer: COMMERCIAL

## 2020-03-10 DIAGNOSIS — O20.9 BLEEDING IN EARLY PREGNANCY: ICD-10-CM

## 2020-03-10 LAB — B-HCG SERPL-ACNC: ABNORMAL MIU/ML

## 2020-03-10 PROCEDURE — 36415 COLL VENOUS BLD VENIPUNCTURE: CPT

## 2020-03-10 PROCEDURE — 84702 CHORIONIC GONADOTROPIN TEST: CPT

## 2020-03-20 ENCOUNTER — ULTRASOUND (OUTPATIENT)
Dept: OBGYN CLINIC | Facility: CLINIC | Age: 40
End: 2020-03-20
Payer: COMMERCIAL

## 2020-03-20 DIAGNOSIS — O36.80X0 PREGNANCY WITH UNCERTAIN FETAL VIABILITY, SINGLE OR UNSPECIFIED FETUS: Primary | ICD-10-CM

## 2020-03-20 DIAGNOSIS — Z78.9 DATE OF LAST MENSTRUAL PERIOD (LMP) UNKNOWN: ICD-10-CM

## 2020-03-20 PROCEDURE — 76801 OB US < 14 WKS SINGLE FETUS: CPT | Performed by: OBSTETRICS & GYNECOLOGY

## 2020-03-20 NOTE — PROGRESS NOTES
SV MIK=478 BPM  CRL=19mm=8w3d  EDC=10/27/2020    UT=100 x 67 x 81 mm  RT OV+31 x 25 x 25 mm  LT OV=32 x 25 x 28 mm    LUDY Fibroid=42 x 37 x 41 mm

## 2020-03-30 ENCOUNTER — TELEMEDICINE (OUTPATIENT)
Dept: OBGYN CLINIC | Facility: CLINIC | Age: 40
End: 2020-03-30

## 2020-03-30 DIAGNOSIS — O09.521 AMA (ADVANCED MATERNAL AGE) MULTIGRAVIDA 35+, FIRST TRIMESTER: Primary | ICD-10-CM

## 2020-03-30 DIAGNOSIS — Z67.91 RH NEGATIVE STATUS DURING PREGNANCY IN FIRST TRIMESTER: ICD-10-CM

## 2020-03-30 DIAGNOSIS — O99.211 OBESITY AFFECTING PREGNANCY, ANTEPARTUM, FIRST TRIMESTER: ICD-10-CM

## 2020-03-30 DIAGNOSIS — O34.10 UTERINE FIBROID DURING PREGNANCY, ANTEPARTUM: ICD-10-CM

## 2020-03-30 DIAGNOSIS — O26.891 RH NEGATIVE STATUS DURING PREGNANCY IN FIRST TRIMESTER: ICD-10-CM

## 2020-03-30 DIAGNOSIS — Z86.19 HISTORY OF HERPES GENITALIS: ICD-10-CM

## 2020-03-30 DIAGNOSIS — D25.9 UTERINE FIBROID DURING PREGNANCY, ANTEPARTUM: ICD-10-CM

## 2020-03-30 PROCEDURE — OBC: Performed by: NURSE PRACTITIONER

## 2020-03-30 NOTE — PROGRESS NOTES
Akash Ag is a 45 yo  at 9w 3d gestation presenting for OB intake via virtual/ tele-visit  After connecting through [employer], the patient was identified by name and date of birth  Akash Ag was informed that this is a telemedicine visit and that the visit is being conducted through [employer] which may not be secure and therefore, might not be HIPAA-compliant  She was also informed that her insurance will be billed for this visit and that most insurance companies are covering these visits due to the current pandemic  My office door was closed  No one else was in the room  She acknowledged consent and understanding of privacy and security of the video platform  The patient has agreed to participate and understands she can discontinue the visit at any time    This is an unplanned pregnancy, conceived after removal of Mirena IUD  Her partner Cynthia Russell is involved  Her blood type is O negative  She received Rhogam on 3/10/20 for spotting in early pregnancy  Dating US done one3/20/20 8w 3d gestation, edc 10/27/20     Obstetrical history:  2011 VSD at [facility] , 6lbs 3oz, uncomplicated; different FOB  2008, TAB    Personal history:  Uterine fibroid, LUDY  Hx of genital herpes  BMI 34 7    Surgical history:  D&C,   Colonoscopy, 2019    Medications  Prenatal vitamin    Allergies:  Amoxicillin    Family history:  Autoimmune dis- mother  Alzheimer's- MGM  Cancer - PGM, ? Type  Heart failure- MGF  Hypertension- MGF, cousin  Gestational diabetes- cousin  Polycystic kidney disease- maternal side  Stroke- mat aunt  sjogrens - mat aunt    Substance use:  Alcohol when unaware of pregnancy, none since    Genetic history:  AMA    Infection history:  HSV  + varicella      ASSESSMENT / PLAN:    1  AMA (advanced maternal age) multigravida 33+, first trimester  45 yo  at 8w 3d gestation    - Obstetric panel; Future  - Urine culture  - Ambulatory Referral to Maternal Fetal Medicine; Future    2   Obesity affecting pregnancy, antepartum, first trimester  11-20 lb weight gain recommended    - Ambulatory Referral to Maternal Fetal Medicine; Future    3  Rh negative status during pregnancy in first trimester  Received Rhogam 3/10/20    4  Uterine fibroid during pregnancy, antepartum  4 2 cm, lower uterine segment    5  History of herpes genitalis  Prophylaxis at week 36      Discussed and reviewed initial prenatal lab work and lab order was provided to patient  Reinforced daily administration of prenatal vitamin  Discussed role of aneuploidy screening in her pregnancy which patient agrees to  Essentials of Pregnancy book and related materials were reviewed in detail  Discomforts and complaints were addressed and her questions were answered  Return visit 3 weeks for initial OB exam- pap is up to date; needs g/c screening  Offer flu vax  Discuss ASA to start at week 14

## 2020-04-09 ENCOUNTER — TELEMEDICINE (OUTPATIENT)
Dept: PERINATAL CARE | Facility: CLINIC | Age: 40
End: 2020-04-09

## 2020-04-09 DIAGNOSIS — O09.529 ANTEPARTUM MULTIGRAVIDA OF ADVANCED MATERNAL AGE: Primary | ICD-10-CM

## 2020-04-09 DIAGNOSIS — Z31.5 ENCOUNTER FOR PROCREATIVE GENETIC COUNSELING: ICD-10-CM

## 2020-04-09 PROCEDURE — NC001 PR NO CHARGE

## 2020-04-17 LAB
EXTERNAL CHLAMYDIA SCREEN: NEGATIVE
EXTERNAL GONORRHEA SCREEN: NEGATIVE

## 2020-04-18 LAB — B-HCG SERPL-ACNC: NORMAL MIU/ML

## 2020-04-20 ENCOUNTER — ROUTINE PRENATAL (OUTPATIENT)
Dept: OBGYN CLINIC | Facility: CLINIC | Age: 40
End: 2020-04-20
Payer: COMMERCIAL

## 2020-04-20 ENCOUNTER — PATIENT MESSAGE (OUTPATIENT)
Dept: OBGYN CLINIC | Facility: CLINIC | Age: 40
End: 2020-04-20

## 2020-04-20 VITALS
HEART RATE: 71 BPM | SYSTOLIC BLOOD PRESSURE: 100 MMHG | BODY MASS INDEX: 34.37 KG/M2 | DIASTOLIC BLOOD PRESSURE: 60 MMHG | RESPIRATION RATE: 12 BRPM | WEIGHT: 203.4 LBS | TEMPERATURE: 97.1 F

## 2020-04-20 DIAGNOSIS — O09.521 AMA (ADVANCED MATERNAL AGE) MULTIGRAVIDA 35+, FIRST TRIMESTER: ICD-10-CM

## 2020-04-20 DIAGNOSIS — O26.891 RH NEGATIVE STATUS DURING PREGNANCY IN FIRST TRIMESTER: ICD-10-CM

## 2020-04-20 DIAGNOSIS — Z11.51 SCREENING FOR HPV (HUMAN PAPILLOMAVIRUS): ICD-10-CM

## 2020-04-20 DIAGNOSIS — Z23 NEED FOR INFLUENZA VACCINATION: ICD-10-CM

## 2020-04-20 DIAGNOSIS — O99.211 OBESITY AFFECTING PREGNANCY, ANTEPARTUM, FIRST TRIMESTER: ICD-10-CM

## 2020-04-20 DIAGNOSIS — Z86.19 HISTORY OF HERPES GENITALIS: ICD-10-CM

## 2020-04-20 DIAGNOSIS — O34.10 UTERINE FIBROID DURING PREGNANCY, ANTEPARTUM: ICD-10-CM

## 2020-04-20 DIAGNOSIS — Z67.91 RH NEGATIVE STATUS DURING PREGNANCY IN FIRST TRIMESTER: ICD-10-CM

## 2020-04-20 DIAGNOSIS — D25.9 UTERINE FIBROID DURING PREGNANCY, ANTEPARTUM: ICD-10-CM

## 2020-04-20 DIAGNOSIS — Z12.4 SCREENING FOR CERVICAL CANCER: Primary | ICD-10-CM

## 2020-04-20 PROCEDURE — PNV: Performed by: NURSE PRACTITIONER

## 2020-04-20 PROCEDURE — 90686 IIV4 VACC NO PRSV 0.5 ML IM: CPT

## 2020-04-20 PROCEDURE — 90471 IMMUNIZATION ADMIN: CPT

## 2020-04-22 ENCOUNTER — TELEPHONE (OUTPATIENT)
Dept: PERINATAL CARE | Facility: CLINIC | Age: 40
End: 2020-04-22

## 2020-04-22 LAB
C TRACH RRNA SPEC QL NAA+PROBE: NOT DETECTED
CLINICAL INFO: NORMAL
CYTO CVX: NORMAL
CYTOLOGY CMNT CVX/VAG CYTO-IMP: NORMAL
DATE PREVIOUS BX: NORMAL
HPV E6+E7 MRNA CVX QL NAA+PROBE: NOT DETECTED
LMP START DATE: NORMAL
MICROORGANISM CVX/VAG CYTO: NORMAL
N GONORRHOEA RRNA SPEC QL NAA+PROBE: NOT DETECTED
SL AMB PREV. PAP:: NORMAL
SPECIMEN SOURCE CVX/VAG CYTO: NORMAL

## 2020-04-23 ENCOUNTER — ROUTINE PRENATAL (OUTPATIENT)
Dept: PERINATAL CARE | Facility: CLINIC | Age: 40
End: 2020-04-23
Payer: COMMERCIAL

## 2020-04-23 VITALS
HEART RATE: 82 BPM | BODY MASS INDEX: 34.09 KG/M2 | HEIGHT: 65 IN | TEMPERATURE: 99.4 F | DIASTOLIC BLOOD PRESSURE: 71 MMHG | WEIGHT: 204.6 LBS | SYSTOLIC BLOOD PRESSURE: 117 MMHG

## 2020-04-23 DIAGNOSIS — O34.11 UTERINE FIBROIDS AFFECTING PREGNANCY IN FIRST TRIMESTER: ICD-10-CM

## 2020-04-23 DIAGNOSIS — D25.9 UTERINE FIBROIDS AFFECTING PREGNANCY IN FIRST TRIMESTER: ICD-10-CM

## 2020-04-23 DIAGNOSIS — O99.211 OBESITY AFFECTING PREGNANCY, ANTEPARTUM, FIRST TRIMESTER: ICD-10-CM

## 2020-04-23 DIAGNOSIS — Z36.82 ENCOUNTER FOR NUCHAL TRANSLUCENCY TESTING: ICD-10-CM

## 2020-04-23 DIAGNOSIS — Z3A.12 12 WEEKS GESTATION OF PREGNANCY: ICD-10-CM

## 2020-04-23 DIAGNOSIS — O09.521 AMA (ADVANCED MATERNAL AGE) MULTIGRAVIDA 35+, FIRST TRIMESTER: Primary | ICD-10-CM

## 2020-04-23 PROCEDURE — 76813 OB US NUCHAL MEAS 1 GEST: CPT | Performed by: OBSTETRICS & GYNECOLOGY

## 2020-04-23 PROCEDURE — 1036F TOBACCO NON-USER: CPT | Performed by: OBSTETRICS & GYNECOLOGY

## 2020-04-23 PROCEDURE — 99213 OFFICE O/P EST LOW 20 MIN: CPT | Performed by: OBSTETRICS & GYNECOLOGY

## 2020-04-23 PROCEDURE — 76801 OB US < 14 WKS SINGLE FETUS: CPT | Performed by: OBSTETRICS & GYNECOLOGY

## 2020-04-29 LAB
# FETUSES US: 1
CFDNA.FET/TOTAL PLAS.CFDNA: NORMAL
FET CHR 13 TS PLAS.CFDNA QL: NEGATIVE
FET CHR 18 TS PLAS.CFDNA QL: NEGATIVE
FET CHR 21 TS PLAS.CFDNA QL: NEGATIVE
FET CHR X + Y ANEUP PLAS.CFDNA QL: NORMAL
FET CHROM X + Y ANEUP CFDNA IMP: NORMAL
FET Y CHROM PLAS.CFDNA QL: NOT DETECTED
FET Y CHROM PLAS.CFDNA: NORMAL
GA (DAYS): 2 D
GA (WEEKS): 13 WK
MICRODELETION SYND BLD/T FISH: NORMAL
REF LAB TEST METHOD: NORMAL
SERVICE CMNT-IMP: NORMAL
SERVICE CMNT-IMP: NORMAL
SL AMB ABNORMAL MSS?: NORMAL
SL AMB ABNORMAL US?: NORMAL
SL AMB ADVANCED MATERNAL AGE?: YES
SL AMB MICRODELETION INTERP: NORMAL
SL AMB MICRODELETION: NORMAL
SL AMB PERSONAL/FAM HISTORY?: NORMAL
SL AMB SPECIFICATIONS: NORMAL

## 2020-05-01 ENCOUNTER — TELEPHONE (OUTPATIENT)
Dept: PERINATAL CARE | Facility: CLINIC | Age: 40
End: 2020-05-01

## 2020-05-12 ENCOUNTER — TELEMEDICINE (OUTPATIENT)
Dept: OBGYN CLINIC | Facility: CLINIC | Age: 40
End: 2020-05-12

## 2020-05-12 VITALS — BODY MASS INDEX: 34.64 KG/M2 | WEIGHT: 205 LBS

## 2020-05-12 DIAGNOSIS — O98.512 HERPES SIMPLEX TYPE 2 (HSV-2) INFECTION AFFECTING PREGNANCY, ANTEPARTUM, SECOND TRIMESTER: ICD-10-CM

## 2020-05-12 DIAGNOSIS — D25.9 UTERINE FIBROID DURING PREGNANCY, ANTEPARTUM: ICD-10-CM

## 2020-05-12 DIAGNOSIS — O99.212 OBESITY AFFECTING PREGNANCY, ANTEPARTUM, SECOND TRIMESTER: ICD-10-CM

## 2020-05-12 DIAGNOSIS — Z67.91 RH NEGATIVE STATUS DURING PREGNANCY IN SECOND TRIMESTER: ICD-10-CM

## 2020-05-12 DIAGNOSIS — O26.892 RH NEGATIVE STATUS DURING PREGNANCY IN SECOND TRIMESTER: ICD-10-CM

## 2020-05-12 DIAGNOSIS — O34.10 UTERINE FIBROID DURING PREGNANCY, ANTEPARTUM: ICD-10-CM

## 2020-05-12 DIAGNOSIS — O09.522 AMA (ADVANCED MATERNAL AGE) MULTIGRAVIDA 35+, SECOND TRIMESTER: Primary | ICD-10-CM

## 2020-05-12 DIAGNOSIS — B00.9 HERPES SIMPLEX TYPE 2 (HSV-2) INFECTION AFFECTING PREGNANCY, ANTEPARTUM, SECOND TRIMESTER: ICD-10-CM

## 2020-05-12 PROCEDURE — PNV: Performed by: OBSTETRICS & GYNECOLOGY

## 2020-05-12 RX ORDER — VALACYCLOVIR HYDROCHLORIDE 1 G/1
1000 TABLET, FILM COATED ORAL DAILY
Qty: 90 TABLET | Refills: 3 | Status: SHIPPED | OUTPATIENT
Start: 2020-05-12 | End: 2022-02-24 | Stop reason: SDUPTHER

## 2020-05-22 ENCOUNTER — ROUTINE PRENATAL (OUTPATIENT)
Dept: OBGYN CLINIC | Facility: CLINIC | Age: 40
End: 2020-05-22

## 2020-05-22 ENCOUNTER — TELEPHONE (OUTPATIENT)
Dept: OBGYN CLINIC | Facility: CLINIC | Age: 40
End: 2020-05-22

## 2020-05-22 VITALS
DIASTOLIC BLOOD PRESSURE: 74 MMHG | TEMPERATURE: 96.4 F | WEIGHT: 205 LBS | OXYGEN SATURATION: 98 % | SYSTOLIC BLOOD PRESSURE: 110 MMHG | HEART RATE: 85 BPM | BODY MASS INDEX: 34.64 KG/M2

## 2020-05-22 DIAGNOSIS — O46.92 ANTEPARTUM BLEEDING, SECOND TRIMESTER: Primary | ICD-10-CM

## 2020-05-22 PROCEDURE — PNV: Performed by: NURSE PRACTITIONER

## 2020-06-01 ENCOUNTER — NURSE TRIAGE (OUTPATIENT)
Dept: OTHER | Facility: OTHER | Age: 40
End: 2020-06-01

## 2020-06-01 ENCOUNTER — ROUTINE PRENATAL (OUTPATIENT)
Dept: OBGYN CLINIC | Facility: CLINIC | Age: 40
End: 2020-06-01

## 2020-06-01 VITALS
OXYGEN SATURATION: 98 % | TEMPERATURE: 97.1 F | BODY MASS INDEX: 34.81 KG/M2 | WEIGHT: 206 LBS | SYSTOLIC BLOOD PRESSURE: 100 MMHG | DIASTOLIC BLOOD PRESSURE: 80 MMHG | HEART RATE: 73 BPM

## 2020-06-01 DIAGNOSIS — O26.899 PREGNANCY WITH ABDOMINAL CRAMPING OF LOWER QUADRANT, ANTEPARTUM: Primary | ICD-10-CM

## 2020-06-01 DIAGNOSIS — R10.30 PREGNANCY WITH ABDOMINAL CRAMPING OF LOWER QUADRANT, ANTEPARTUM: Primary | ICD-10-CM

## 2020-06-01 PROCEDURE — PNV: Performed by: NURSE PRACTITIONER

## 2020-06-02 LAB
# FETUSES US: 1
AFP ADJ MOM SERPL: 1.2
AFP INTERP SERPL-IMP: NORMAL
AFP SERPL-MCNC: 44.2 NG/ML
AGE: NORMAL
DONATED EGG PATIENT QL: NO
GA CLIN EST: 18.4 WEEKS
GA METHOD: NORMAL
HX OF NTD NARR: NO
HX OF TRISOMY 21 NARR: NO
IDDM PATIENT QL: NO
NEURAL TUBE DEFECT RISK FETUS: NORMAL %
SL AMB REPEAT SPECIMEN: NO

## 2020-06-03 ENCOUNTER — TELEPHONE (OUTPATIENT)
Dept: OBGYN CLINIC | Facility: CLINIC | Age: 40
End: 2020-06-03

## 2020-06-05 ENCOUNTER — TELEPHONE (OUTPATIENT)
Dept: PERINATAL CARE | Facility: CLINIC | Age: 40
End: 2020-06-05

## 2020-06-10 ENCOUNTER — TELEPHONE (OUTPATIENT)
Dept: OBGYN CLINIC | Facility: CLINIC | Age: 40
End: 2020-06-10

## 2020-06-12 ENCOUNTER — TELEPHONE (OUTPATIENT)
Dept: PERINATAL CARE | Facility: CLINIC | Age: 40
End: 2020-06-12

## 2020-06-15 ENCOUNTER — ROUTINE PRENATAL (OUTPATIENT)
Dept: PERINATAL CARE | Facility: CLINIC | Age: 40
End: 2020-06-15
Payer: COMMERCIAL

## 2020-06-15 VITALS
BODY MASS INDEX: 34.85 KG/M2 | HEART RATE: 97 BPM | SYSTOLIC BLOOD PRESSURE: 120 MMHG | WEIGHT: 209.2 LBS | DIASTOLIC BLOOD PRESSURE: 58 MMHG | HEIGHT: 65 IN | TEMPERATURE: 96.4 F

## 2020-06-15 DIAGNOSIS — D25.9 UTERINE FIBROID DURING PREGNANCY, ANTEPARTUM: ICD-10-CM

## 2020-06-15 DIAGNOSIS — Z36.86 ENCOUNTER FOR ANTENATAL SCREENING FOR CERVICAL LENGTH: ICD-10-CM

## 2020-06-15 DIAGNOSIS — O34.10 UTERINE FIBROID DURING PREGNANCY, ANTEPARTUM: ICD-10-CM

## 2020-06-15 DIAGNOSIS — Z36.3 ENCOUNTER FOR ANTENATAL SCREENING FOR MALFORMATIONS: ICD-10-CM

## 2020-06-15 DIAGNOSIS — O99.212 OBESITY AFFECTING PREGNANCY, ANTEPARTUM, SECOND TRIMESTER: Primary | ICD-10-CM

## 2020-06-15 DIAGNOSIS — O09.522 AMA (ADVANCED MATERNAL AGE) MULTIGRAVIDA 35+, SECOND TRIMESTER: ICD-10-CM

## 2020-06-15 PROBLEM — Z12.4 PAP SMEAR FOR CERVICAL CANCER SCREENING: Status: RESOLVED | Noted: 2018-08-16 | Resolved: 2020-06-15

## 2020-06-15 PROBLEM — Z01.419 ENCOUNTER FOR ANNUAL ROUTINE GYNECOLOGICAL EXAMINATION: Status: RESOLVED | Noted: 2018-08-16 | Resolved: 2020-06-15

## 2020-06-15 PROCEDURE — 99212 OFFICE O/P EST SF 10 MIN: CPT | Performed by: OBSTETRICS & GYNECOLOGY

## 2020-06-15 PROCEDURE — 76811 OB US DETAILED SNGL FETUS: CPT | Performed by: OBSTETRICS & GYNECOLOGY

## 2020-06-15 PROCEDURE — 1036F TOBACCO NON-USER: CPT | Performed by: OBSTETRICS & GYNECOLOGY

## 2020-06-15 PROCEDURE — 76817 TRANSVAGINAL US OBSTETRIC: CPT | Performed by: OBSTETRICS & GYNECOLOGY

## 2020-07-14 ENCOUNTER — ROUTINE PRENATAL (OUTPATIENT)
Dept: OBGYN CLINIC | Facility: CLINIC | Age: 40
End: 2020-07-14

## 2020-07-14 VITALS
OXYGEN SATURATION: 99 % | WEIGHT: 211.8 LBS | HEIGHT: 65 IN | TEMPERATURE: 97.2 F | BODY MASS INDEX: 35.29 KG/M2 | HEART RATE: 99 BPM | SYSTOLIC BLOOD PRESSURE: 124 MMHG | DIASTOLIC BLOOD PRESSURE: 76 MMHG

## 2020-07-14 DIAGNOSIS — O09.522 AMA (ADVANCED MATERNAL AGE) MULTIGRAVIDA 35+, SECOND TRIMESTER: ICD-10-CM

## 2020-07-14 DIAGNOSIS — O26.892 RH NEGATIVE STATUS DURING PREGNANCY IN SECOND TRIMESTER: ICD-10-CM

## 2020-07-14 DIAGNOSIS — O98.512 HERPES SIMPLEX TYPE 2 (HSV-2) INFECTION AFFECTING PREGNANCY, ANTEPARTUM, SECOND TRIMESTER: ICD-10-CM

## 2020-07-14 DIAGNOSIS — D25.9 UTERINE FIBROID DURING PREGNANCY, ANTEPARTUM: Primary | ICD-10-CM

## 2020-07-14 DIAGNOSIS — B00.9 HERPES SIMPLEX TYPE 2 (HSV-2) INFECTION AFFECTING PREGNANCY, ANTEPARTUM, SECOND TRIMESTER: ICD-10-CM

## 2020-07-14 DIAGNOSIS — O99.212 OBESITY AFFECTING PREGNANCY, ANTEPARTUM, SECOND TRIMESTER: ICD-10-CM

## 2020-07-14 DIAGNOSIS — O34.10 UTERINE FIBROID DURING PREGNANCY, ANTEPARTUM: Primary | ICD-10-CM

## 2020-07-14 DIAGNOSIS — Z67.91 RH NEGATIVE STATUS DURING PREGNANCY IN SECOND TRIMESTER: ICD-10-CM

## 2020-07-14 PROCEDURE — 3008F BODY MASS INDEX DOCD: CPT | Performed by: OBSTETRICS & GYNECOLOGY

## 2020-07-14 PROCEDURE — PNV: Performed by: OBSTETRICS & GYNECOLOGY

## 2020-07-14 NOTE — PROGRESS NOTES
Pt is a 44 y o   24w4d  Pregnancy is complicated by AMA, RH negative, obesity, uterine fibroid and HSV  Pt reports +FM  Denies vb, lof, ctx  PTL precautions reviewed  Pt had anatomy scan  Has follow up at 28 weeks for growth  OB panel never resulted  Pt advised to have drawn  Also given 1 hour gtt slip  Rhogam next visit  F/U 4 weeks

## 2020-08-04 ENCOUNTER — ROUTINE PRENATAL (OUTPATIENT)
Dept: OBGYN CLINIC | Facility: CLINIC | Age: 40
End: 2020-08-04

## 2020-08-04 VITALS
WEIGHT: 214 LBS | TEMPERATURE: 97.5 F | HEART RATE: 87 BPM | BODY MASS INDEX: 36.17 KG/M2 | SYSTOLIC BLOOD PRESSURE: 124 MMHG | DIASTOLIC BLOOD PRESSURE: 82 MMHG | OXYGEN SATURATION: 99 %

## 2020-08-04 DIAGNOSIS — O99.712 PRURITUS OF PREGNANCY IN SECOND TRIMESTER: Primary | ICD-10-CM

## 2020-08-04 DIAGNOSIS — L29.9 PRURITUS OF PREGNANCY IN SECOND TRIMESTER: Primary | ICD-10-CM

## 2020-08-04 LAB
EXTERNAL ANTIBODY SCREEN: NORMAL
EXTERNAL HEMATOCRIT: 30.8 %
EXTERNAL HEMOGLOBIN: 10.2 G/DL
EXTERNAL HIV-1/2 AB-AG: NORMAL
EXTERNAL PLATELET COUNT: 253 K/ΜL
EXTERNAL RH FACTOR: NEGATIVE
EXTERNAL RUBELLA IGG QUANTITATION: POSITIVE
EXTERNAL SYPHILIS RPR SCREEN: NORMAL

## 2020-08-04 PROCEDURE — PNV: Performed by: NURSE PRACTITIONER

## 2020-08-04 NOTE — PROGRESS NOTES
43 yo  at 27w 4d gestation  Presents for interval visit due to passing out after doing her 1 hour GTT this morning  Patient reports she did not eat anything prior to having the test   She is now feeling better after eating throughout the day  Reinforced Q 2hour snack/meals w/ protein  BP today normal at 124/82    Incidentally mentions that her toes have been itching for about one month  She did not realize that it was something she should have mentioned  Will check CMP w/ bile acids  S>D by 1 cm  Has a Growth scan 20  +FHTs, baby active, no LOF VB or pain  RV as scheduled for routine PN visit

## 2020-08-04 NOTE — PROGRESS NOTES
Assessment/Plan:    No problem-specific Assessment & Plan notes found for this encounter  {Assess/PlanSmartLinks:20171}      Subjective:      Patient ID: Sotero Ansari is a 44 y o  female  HPI    Did 1 hour gtt this morning and passed out  Has had itching of toes for over a month    Growth scan 9/9/20    {[employer] SmartLinks:35014}    Review of Systems      Objective:      /82 (BP Location: Left arm, Patient Position: Sitting, Cuff Size: Standard)   Pulse 87   Temp 97 5 °F (36 4 °C)   Wt 97 1 kg (214 lb)   LMP 01/24/2020 (Exact Date)   SpO2 99%   BMI 36 17 kg/m²          Physical Exam

## 2020-08-05 ENCOUNTER — OB ABSTRACT (OUTPATIENT)
Dept: OBGYN CLINIC | Facility: CLINIC | Age: 40
End: 2020-08-05

## 2020-08-05 ENCOUNTER — TELEPHONE (OUTPATIENT)
Dept: OBGYN CLINIC | Facility: CLINIC | Age: 40
End: 2020-08-05

## 2020-08-05 DIAGNOSIS — R73.09 ELEVATED GLUCOSE TOLERANCE TEST: Primary | ICD-10-CM

## 2020-08-05 LAB
ABO GROUP BLD: ABNORMAL
BASOPHILS # BLD AUTO: 9 CELLS/UL (ref 0–200)
BASOPHILS NFR BLD AUTO: 0.1 %
BLD GP AB SCN SERPL QL: ABNORMAL
EOSINOPHIL # BLD AUTO: 0 CELLS/UL (ref 15–500)
EOSINOPHIL NFR BLD AUTO: 0 %
ERYTHROCYTE [DISTWIDTH] IN BLOOD BY AUTOMATED COUNT: 12.2 % (ref 11–15)
GLUCOSE 1H P 50 G GLC PO SERPL-MCNC: 145 MG/DL
HBV SURFACE AG SERPL QL IA: ABNORMAL
HCT VFR BLD AUTO: 30.8 % (ref 35–45)
HGB BLD-MCNC: 10.2 G/DL (ref 11.7–15.5)
HIV 1+2 AB+HIV1 P24 AG SERPL QL IA: NORMAL
LYMPHOCYTES # BLD AUTO: 1523 CELLS/UL (ref 850–3900)
LYMPHOCYTES NFR BLD AUTO: 17.5 %
MCH RBC QN AUTO: 32.5 PG (ref 27–33)
MCHC RBC AUTO-ENTMCNC: 33.1 G/DL (ref 32–36)
MCV RBC AUTO: 98.1 FL (ref 80–100)
MONOCYTES # BLD AUTO: 574 CELLS/UL (ref 200–950)
MONOCYTES NFR BLD AUTO: 6.6 %
NEUTROPHILS # BLD AUTO: 6595 CELLS/UL (ref 1500–7800)
NEUTROPHILS NFR BLD AUTO: 75.8 %
PLATELET # BLD AUTO: 253 THOUSAND/UL (ref 140–400)
PMV BLD REES-ECKER: 10.1 FL (ref 7.5–12.5)
RBC # BLD AUTO: 3.14 MILLION/UL (ref 3.8–5.1)
RH BLD: ABNORMAL
RPR SER QL: ABNORMAL
RUBV IGG SERPL IA-ACNC: 2.23 INDEX
WBC # BLD AUTO: 8.7 THOUSAND/UL (ref 3.8–10.8)

## 2020-08-05 NOTE — TELEPHONE ENCOUNTER
----- Message from Martir Mccormcik 33 sent at 8/5/2020  8:20 AM EDT -----  1 hour GTT elevated at 145  Needs 3 hour GTT

## 2020-08-05 NOTE — TELEPHONE ENCOUNTER
Patient aware  Patient will pick script up at her appointment on 8/11/20 as she goes to [facility] for her lab work

## 2020-08-11 ENCOUNTER — ROUTINE PRENATAL (OUTPATIENT)
Dept: OBGYN CLINIC | Facility: CLINIC | Age: 40
End: 2020-08-11
Payer: COMMERCIAL

## 2020-08-11 VITALS
SYSTOLIC BLOOD PRESSURE: 100 MMHG | OXYGEN SATURATION: 99 % | HEART RATE: 95 BPM | WEIGHT: 214 LBS | TEMPERATURE: 97.6 F | BODY MASS INDEX: 36.17 KG/M2 | DIASTOLIC BLOOD PRESSURE: 70 MMHG

## 2020-08-11 DIAGNOSIS — O26.893 RH NEGATIVE STATUS DURING PREGNANCY IN THIRD TRIMESTER: ICD-10-CM

## 2020-08-11 DIAGNOSIS — O99.213 OBESITY AFFECTING PREGNANCY, ANTEPARTUM, THIRD TRIMESTER: ICD-10-CM

## 2020-08-11 DIAGNOSIS — O09.523 AMA (ADVANCED MATERNAL AGE) MULTIGRAVIDA 35+, THIRD TRIMESTER: ICD-10-CM

## 2020-08-11 DIAGNOSIS — D25.9 UTERINE FIBROID DURING PREGNANCY, ANTEPARTUM: Primary | ICD-10-CM

## 2020-08-11 DIAGNOSIS — B00.9 HERPES SIMPLEX TYPE 2 (HSV-2) INFECTION AFFECTING PREGNANCY, ANTEPARTUM, THIRD TRIMESTER: ICD-10-CM

## 2020-08-11 DIAGNOSIS — Z67.91 RH NEGATIVE STATUS DURING PREGNANCY IN THIRD TRIMESTER: ICD-10-CM

## 2020-08-11 DIAGNOSIS — O34.10 UTERINE FIBROID DURING PREGNANCY, ANTEPARTUM: Primary | ICD-10-CM

## 2020-08-11 DIAGNOSIS — O98.513 HERPES SIMPLEX TYPE 2 (HSV-2) INFECTION AFFECTING PREGNANCY, ANTEPARTUM, THIRD TRIMESTER: ICD-10-CM

## 2020-08-11 PROCEDURE — PNV: Performed by: OBSTETRICS & GYNECOLOGY

## 2020-08-11 NOTE — PROGRESS NOTES
Pt is a 44 y o   28w4d  Pregnancy is complicated by AMA, obesity, RH negative, fibroid uterus, h o herpes, abnormal 1 hour gtt  Pt reports +FM  Denies vb, lof, ctx  PTL precautions and fkc reviewed   1 hour gtt 145, recommend 3 hour gtt vs DIPP for 1 week of testing bg 4x/day as she had a syncopal episode after her 1 hour gtt-pt reports she would like to try 3 hour gtt  Rhogam given today  F/u 2 weeks

## 2020-08-13 LAB
ALBUMIN SERPL-MCNC: 3.1 G/DL (ref 3.6–5.1)
ALBUMIN/GLOB SERPL: 1.3 (CALC) (ref 1–2.5)
ALP SERPL-CCNC: 74 U/L (ref 31–125)
ALT SERPL-CCNC: 8 U/L (ref 6–29)
AST SERPL-CCNC: 12 U/L (ref 10–30)
BILE AC SERPL-SCNC: 11 UMOL/L (ref 0–19)
BILIRUB SERPL-MCNC: 0.3 MG/DL (ref 0.2–1.2)
BUN SERPL-MCNC: 5 MG/DL (ref 7–25)
BUN/CREAT SERPL: 9 (CALC) (ref 6–22)
CALCIUM SERPL-MCNC: 8.8 MG/DL (ref 8.6–10.2)
CHLORIDE SERPL-SCNC: 106 MMOL/L (ref 98–110)
CO2 SERPL-SCNC: 25 MMOL/L (ref 20–32)
CREAT SERPL-MCNC: 0.53 MG/DL (ref 0.5–1.1)
GLOBULIN SER CALC-MCNC: 2.3 G/DL (CALC) (ref 1.9–3.7)
GLUCOSE SERPL-MCNC: 101 MG/DL (ref 65–139)
POTASSIUM SERPL-SCNC: 3.7 MMOL/L (ref 3.5–5.3)
PROT SERPL-MCNC: 5.4 G/DL (ref 6.1–8.1)
SL AMB EGFR AFRICAN AMERICAN: 139 ML/MIN/1.73M2
SL AMB EGFR NON AFRICAN AMERICAN: 120 ML/MIN/1.73M2
SODIUM SERPL-SCNC: 138 MMOL/L (ref 135–146)

## 2020-08-17 LAB
GLUCOSE 1H P GLC SERPL-MCNC: 167 MG/DL
GLUCOSE 2H P 75 G GLC PO SERPL-MCNC: 181 MG/DL
GLUCOSE 3H P 100 G GLC PO SERPL-MCNC: 103 MG/DL
GLUCOSE P FAST SERPL-MCNC: 79 MG/DL

## 2020-08-18 ENCOUNTER — OB ABSTRACT (OUTPATIENT)
Dept: OBGYN CLINIC | Facility: CLINIC | Age: 40
End: 2020-08-18

## 2020-09-03 ENCOUNTER — ROUTINE PRENATAL (OUTPATIENT)
Dept: OBGYN CLINIC | Facility: CLINIC | Age: 40
End: 2020-09-03
Payer: COMMERCIAL

## 2020-09-03 VITALS
BODY MASS INDEX: 36 KG/M2 | DIASTOLIC BLOOD PRESSURE: 70 MMHG | OXYGEN SATURATION: 97 % | SYSTOLIC BLOOD PRESSURE: 100 MMHG | HEART RATE: 104 BPM | TEMPERATURE: 98.1 F | WEIGHT: 213 LBS

## 2020-09-03 DIAGNOSIS — O26.893 RH NEGATIVE STATUS DURING PREGNANCY IN THIRD TRIMESTER: ICD-10-CM

## 2020-09-03 DIAGNOSIS — O98.513 HERPES SIMPLEX TYPE 2 (HSV-2) INFECTION AFFECTING PREGNANCY, ANTEPARTUM, THIRD TRIMESTER: ICD-10-CM

## 2020-09-03 DIAGNOSIS — O09.523 AMA (ADVANCED MATERNAL AGE) MULTIGRAVIDA 35+, THIRD TRIMESTER: ICD-10-CM

## 2020-09-03 DIAGNOSIS — R73.09 ELEVATED GLUCOSE TOLERANCE TEST: ICD-10-CM

## 2020-09-03 DIAGNOSIS — D25.9 UTERINE FIBROID DURING PREGNANCY, ANTEPARTUM: Primary | ICD-10-CM

## 2020-09-03 DIAGNOSIS — B00.9 HERPES SIMPLEX TYPE 2 (HSV-2) INFECTION AFFECTING PREGNANCY, ANTEPARTUM, THIRD TRIMESTER: ICD-10-CM

## 2020-09-03 DIAGNOSIS — O99.213 OBESITY AFFECTING PREGNANCY, ANTEPARTUM, THIRD TRIMESTER: ICD-10-CM

## 2020-09-03 DIAGNOSIS — Z23 NEED FOR TDAP VACCINATION: ICD-10-CM

## 2020-09-03 DIAGNOSIS — Z67.91 RH NEGATIVE STATUS DURING PREGNANCY IN THIRD TRIMESTER: ICD-10-CM

## 2020-09-03 DIAGNOSIS — O34.10 UTERINE FIBROID DURING PREGNANCY, ANTEPARTUM: Primary | ICD-10-CM

## 2020-09-03 PROCEDURE — 90471 IMMUNIZATION ADMIN: CPT

## 2020-09-03 PROCEDURE — 90715 TDAP VACCINE 7 YRS/> IM: CPT

## 2020-09-03 PROCEDURE — PNV: Performed by: OBSTETRICS & GYNECOLOGY

## 2020-09-03 NOTE — PROGRESS NOTES
Pt is a 44 y o   31w6d  Pregnancy is complicated by AMA, obesity, RH negative, fibroid uterus, h o herpes, abnormal 1 hour gtt with 3 hour gtt with 3/4 normal values  Pt reports +FM  Denies vb, lof, ctx  PTL precautions  and fkc reviewed  TDAP administered today  3d trimester folder given and reviewed  Has  u/s later this week for growth  F/u 2 weeks

## 2020-09-08 ENCOUNTER — TELEPHONE (OUTPATIENT)
Dept: PERINATAL CARE | Facility: CLINIC | Age: 40
End: 2020-09-08

## 2020-09-08 NOTE — TELEPHONE ENCOUNTER
Attempted to reach patient by phone and left voicemail to confirm appointment for MFM ultrasound  1 support person ( must be over the age of 25) may accompany you for your appointment  If you or your support person have traveled outside the state in the past 2 weeks, please call and notify our office today #[phone]  You and your support person must wear a mask ,covering nose and mouth,during your entire visit  You and your support person will have temperature screened upon arrival     To minimize your exposure in our waiting room, please call our office prior to entering the building  Check in and rooming questions will be done via phone  We will give you directions when to enter for your appointment  Inside office # provided:  [place] line: [phone]  [facility] [place], [place] line:  [phone]  [facility] line:  [address] line:  [phone]  [facility] line:  [phone]  [place] line:  [phone]    IF you are not feeling well- cough, fever, shortness of breath or any flu like symptoms, contact your primary care physician or [facility]    Any questions with these instructions please call Maternal Fetal Medicine nurse line today @ # [phone]

## 2020-09-09 ENCOUNTER — ULTRASOUND (OUTPATIENT)
Dept: PERINATAL CARE | Facility: CLINIC | Age: 40
End: 2020-09-09
Payer: COMMERCIAL

## 2020-09-09 VITALS
TEMPERATURE: 96.7 F | SYSTOLIC BLOOD PRESSURE: 108 MMHG | DIASTOLIC BLOOD PRESSURE: 66 MMHG | HEIGHT: 65 IN | WEIGHT: 211.6 LBS | HEART RATE: 87 BPM | BODY MASS INDEX: 35.25 KG/M2

## 2020-09-09 DIAGNOSIS — O99.213 OBESITY AFFECTING PREGNANCY, ANTEPARTUM, THIRD TRIMESTER: ICD-10-CM

## 2020-09-09 DIAGNOSIS — Z3A.32 32 WEEKS GESTATION OF PREGNANCY: ICD-10-CM

## 2020-09-09 DIAGNOSIS — R73.09 ELEVATED GLUCOSE TOLERANCE TEST: ICD-10-CM

## 2020-09-09 DIAGNOSIS — O36.63X0 EXCESSIVE FETAL GROWTH AFFECTING MANAGEMENT OF PREGNANCY IN THIRD TRIMESTER, SINGLE OR UNSPECIFIED FETUS: Primary | ICD-10-CM

## 2020-09-09 DIAGNOSIS — K83.1 CHOLESTASIS DURING PREGNANCY IN THIRD TRIMESTER: ICD-10-CM

## 2020-09-09 DIAGNOSIS — O26.613 CHOLESTASIS DURING PREGNANCY IN THIRD TRIMESTER: ICD-10-CM

## 2020-09-09 PROBLEM — N94.3 PMS (PREMENSTRUAL SYNDROME): Status: RESOLVED | Noted: 2017-04-20 | Resolved: 2020-09-09

## 2020-09-09 PROBLEM — N94.0 MITTELSCHMERZ: Status: RESOLVED | Noted: 2017-04-20 | Resolved: 2020-09-09

## 2020-09-09 PROBLEM — O26.643 CHOLESTASIS DURING PREGNANCY IN THIRD TRIMESTER: Status: ACTIVE | Noted: 2020-09-09

## 2020-09-09 PROBLEM — R53.83 OTHER FATIGUE: Status: RESOLVED | Noted: 2018-08-16 | Resolved: 2020-09-09

## 2020-09-09 PROBLEM — R14.0 BLOATING: Status: RESOLVED | Noted: 2018-05-24 | Resolved: 2020-09-09

## 2020-09-09 PROCEDURE — 76816 OB US FOLLOW-UP PER FETUS: CPT | Performed by: OBSTETRICS & GYNECOLOGY

## 2020-09-09 PROCEDURE — 99214 OFFICE O/P EST MOD 30 MIN: CPT | Performed by: OBSTETRICS & GYNECOLOGY

## 2020-09-09 PROCEDURE — 76818 FETAL BIOPHYS PROFILE W/NST: CPT | Performed by: OBSTETRICS & GYNECOLOGY

## 2020-09-09 RX ORDER — URSODIOL 300 MG/1
300 CAPSULE ORAL 2 TIMES DAILY
Qty: 60 CAPSULE | Refills: 1 | Status: ON HOLD | OUTPATIENT
Start: 2020-09-09 | End: 2020-10-02

## 2020-09-09 NOTE — LETTER
NST sleeve cover sheet    Patient name: Tristian Coe  : 1980  MRN: [id number]    MITESH: Estimated Date of Delivery: 10/30/20    Obstetrician: _____Dr  Baskaylie_________    Reason(s) for testing:  _____________Cholestasis________      Testing frequency:    ___ 2x/wk  ___ 1x/wk  ___ Dopplers  ___ BPP?       Last growth scan: __________________________________________

## 2020-09-09 NOTE — PROGRESS NOTES
On exam today the patient appears well, in no acute distress  She has had itching on her feet and she had elevated bile acids of 11 on  which is elevated in pregnancy  She was previously told this value was normal   Her abdomen is non-tender  Fetal weight is at the 93rd%tile for gestational age; however, the [facility] is measuring greater than the 95th%tile for gestational age  A large abdominal circumference can sometimes indicate macrosomia and is seen commonly in poorly controlled diabetes  The patient was not technically diagnosed with diabetes however she did have 1 out of four values abnormal which can be utilized to diagnose gestational diabetes  Good fetal movement and tone are seen  The amniotic fluid volume appears normal     The anatomic structures listed above could not be optimally imaged today because of fetal position; however, these structures were seen on a prior scan and appeared sonographically normal       The patient was informed of today's findings and all of her questions were answered  The limitations of ultrasound were reviewed with the patient, which she accepts  Recommend treating the patient as having gestational diabetes based upon 1 out of four abnormal values which more recently has demonstrated outcome summer those patients who are previously diagnosed with 2 out of for values as having gestational diabetes  Given the significant macrosomia and biometric parameters as well as risk factors, recommend treating the patient and referring her to the diabetes in pregnancy program     Gestational diabetes mellitus (GDM) is defined as glucose intolerance with onset during pregnancy  This affects between 2% and 14% of all gravid women  We discussed that pregnancies complicated by GDM are associated with increased  and maternal morbidity    Fetal risks include macrosomia, shoulder dystocia, birth injuries, hypoglycemia, hyperbilirubinemia, and potential long-term sequelae such as obesity and impaired intellectual achievement  Maternal risks include preeclampsia and hypertensive disorders of pregnancy, operative delivery, and the development of subsequent diabetes mellitus  Aggressive treatment of GDM with diet, exercise, and medications including insulin and sometimes metformin have been shown to decrease fetal and  morbidity and mortality  We have a diabetes in pregnancy program where we follow our patients closely to help achieve optimal maternal and  outcomes  Recommend the patient be referred to this program for management  The patient has been having itching in her feet  She had bile acids that were 11 which is consistent with cholestasis of pregnancy  Recommend diagnosis in the patient as having cholestasis of pregnancy and instituting  surveillance with twice weekly nonstress test and weekly amniotic fluid evaluations  She had a nonstress test after today's ultrasound  I provided her with a prescription for Ursodiol and I recommend she be delivered during the 36th week in accordance with ACOG guidelines  Please note, in addition to the time spent discussing the results of the ultrasound, I spent approximately 25 minutes of face-to-face time with the patient, greater than 50% of which was spent in counseling and the coordination of care for this patient  Thank you very much for allowing us to participate in the care of this very nice patient  Should you have any questions, please do not hesitate to contact our office

## 2020-09-09 NOTE — LETTER
September 9, 2020     Kaiden Zhang, [address] [place] [address]    Patient: Augustine Bains   YOB: 1980   Date of Visit: 9/9/2020       Dear Dr Kandice Russo: Thank you for referring Bernadine Motta to me for evaluation  Below are my notes for this consultation  If you have questions, please do not hesitate to call me  I look forward to following your patient along with you  Sincerely,        Rubina Mosley MD        CC: MD Rubina Grijalva MD  9/9/2020  9:50 AM  Incomplete    On exam today the patient appears well, in no acute distress  She has had itching on her feet and she had elevated bile acids of 11 on August 7th which is elevated in pregnancy  She was previously told this value was normal   Her abdomen is non-tender  Fetal weight is at the 93rd%tile for gestational age; however, the [facility] is measuring greater than the 95th%tile for gestational age  A large abdominal circumference can sometimes indicate macrosomia and is seen commonly in poorly controlled diabetes  The patient was not technically diagnosed with diabetes however she did have 1 out of four values abnormal which can be utilized to diagnose gestational diabetes  Good fetal movement and tone are seen  The amniotic fluid volume appears normal     The anatomic structures listed above could not be optimally imaged today because of fetal position; however, these structures were seen on a prior scan and appeared sonographically normal       The patient was informed of today's findings and all of her questions were answered  The limitations of ultrasound were reviewed with the patient, which she accepts      Recommend treating the patient as having gestational diabetes based upon 1 out of four abnormal values which more recently has demonstrated outcome summer those patients who are previously diagnosed with 2 out of for values as having gestational diabetes  Given the significant macrosomia and biometric parameters as well as risk factors, recommend treating the patient and referring her to the diabetes in pregnancy program     Gestational diabetes mellitus (GDM) is defined as glucose intolerance with onset during pregnancy  This affects between 2% and 14% of all gravid women  We discussed that pregnancies complicated by GDM are associated with increased  and maternal morbidity  Fetal risks include macrosomia, shoulder dystocia, birth injuries, hypoglycemia, hyperbilirubinemia, and potential long-term sequelae such as obesity and impaired intellectual achievement  Maternal risks include preeclampsia and hypertensive disorders of pregnancy, operative delivery, and the development of subsequent diabetes mellitus  Aggressive treatment of GDM with diet, exercise, and medications including insulin and sometimes metformin have been shown to decrease fetal and  morbidity and mortality  We have a diabetes in pregnancy program where we follow our patients closely to help achieve optimal maternal and  outcomes  Recommend the patient be referred to this program for management  The patient has been having itching in her feet  She had bile acids that were 11 which is consistent with cholestasis of pregnancy  Recommend diagnosis in the patient as having cholestasis of pregnancy and instituting  surveillance with twice weekly nonstress test and weekly amniotic fluid evaluations  She had a nonstress test after today's ultrasound  I provided her with a prescription for Ursodiol and I recommend she be delivered during the 36th week in accordance with ACOG guidelines        Please note, in addition to the time spent discussing the results of the ultrasound, I spent approximately 25 minutes of face-to-face time with the patient, greater than 50% of which was spent in counseling and the coordination of care for this patient  Thank you very much for allowing us to participate in the care of this very nice patient  Should you have any questions, please do not hesitate to contact our office

## 2020-09-10 ENCOUNTER — TELEPHONE (OUTPATIENT)
Dept: PERINATAL CARE | Facility: CLINIC | Age: 40
End: 2020-09-10

## 2020-09-11 ENCOUNTER — TELEPHONE (OUTPATIENT)
Dept: PERINATAL CARE | Facility: CLINIC | Age: 40
End: 2020-09-11

## 2020-09-11 NOTE — TELEPHONE ENCOUNTER
Left patient a message that her MFM appointment had to be rescheduled  The new time, date and location were provided - 10/14/20  3:15  [place]  The patient has been instructed to please call us back at [phone] with any questions or concerns

## 2020-09-11 NOTE — TELEPHONE ENCOUNTER
Attempted to reach patient by phone and left voicemail to confirm appointment for MFM ultrasound  1 support person ( must be over the age of 25) may accompany you for your appointment  If you or your support person have traveled outside the state in the past 2 weeks, please call and notify our office today #[phone]  You and your support person must wear a mask ,covering nose and mouth,during your entire visit  You and your support person will have temperature screened upon arrival     To minimize your exposure in our waiting room, please call our office prior to entering the building  Check in and rooming questions will be done via phone  We will give you directions when to enter for your appointment  Inside office # provided:  [facility] line: [phone]  [facility] line:  [phone]  [facility] line:  [address] line:  [phone]  [facility] line:  [phone]  [place] line:  [phone]    IF you are not feeling well- cough, fever, shortness of breath or any flu like symptoms, contact your primary care physician or 1-[facility] Rosalinda Shawok    Any questions with these instructions please call Maternal Fetal Medicine nurse line today @ # [phone]

## 2020-09-14 ENCOUNTER — ROUTINE PRENATAL (OUTPATIENT)
Dept: PERINATAL CARE | Facility: CLINIC | Age: 40
End: 2020-09-14
Payer: COMMERCIAL

## 2020-09-14 VITALS
HEIGHT: 65 IN | HEART RATE: 85 BPM | SYSTOLIC BLOOD PRESSURE: 127 MMHG | DIASTOLIC BLOOD PRESSURE: 60 MMHG | TEMPERATURE: 97.3 F | BODY MASS INDEX: 35.56 KG/M2 | WEIGHT: 213.4 LBS

## 2020-09-14 DIAGNOSIS — K83.1 CHOLESTASIS DURING PREGNANCY IN THIRD TRIMESTER: ICD-10-CM

## 2020-09-14 DIAGNOSIS — Z3A.33 33 WEEKS GESTATION OF PREGNANCY: Primary | ICD-10-CM

## 2020-09-14 DIAGNOSIS — O26.613 CHOLESTASIS DURING PREGNANCY IN THIRD TRIMESTER: ICD-10-CM

## 2020-09-14 PROCEDURE — 59025 FETAL NON-STRESS TEST: CPT | Performed by: OBSTETRICS & GYNECOLOGY

## 2020-09-14 NOTE — PATIENT INSTRUCTIONS
Nonstress Test for Pregnancy   WHAT YOU NEED TO KNOW:   What do I need to know about a nonstress test?  A nonstress test measures your baby's heart rate and movements  Nonstress means that no stress will be placed on your baby during the test    How do I prepare for a nonstress test?  Your healthcare provider will talk to you about how to prepare for this test  He may tell you to eat and drink plenty of fluids before your test  If you smoke, you may be asked not to smoke within 2 hours before the test  He will also tell you what medicines to take or not take on the day of your test    What will happen during a nonstress test?  You may be asked to lie down or recline back for the test on a bed  One or two belts with sensors will be placed around your abdomen  Your baby's heart rate will be recorded with a machine  If your baby does not move, your baby may be asleep  Your healthcare provider may make a noise near your abdomen to try to wake your baby  The test usually takes about 20 minutes, but can take longer if your baby needs to be awakened  What do I need to know about the test results? Your baby will be expected to move at least twice for a certain amount of time  Your baby's heart rate will be expected to go up by a certain number of beats per minute during movement  If your baby does not move as expected, the test may need to be repeated or you may need other tests  CARE AGREEMENT:   You have the right to help plan your care  Learn about your health condition and how it may be treated  Discuss treatment options with your caregivers to decide what care you want to receive  You always have the right to refuse treatment  The above information is an  only  It is not intended as medical advice for individual conditions or treatments  Talk to your doctor, nurse or pharmacist before following any medical regimen to see if it is safe and effective for you    © 2017 [employer]  Information is for End User's use only and may not be sold, redistributed or otherwise used for commercial purposes  All illustrations and images included in CareNotes® are the copyrighted property of A D A M , Inc  or David Rice  Kick Counts in Pregnancy   AMBULATORY CARE:   Kick counts  measure how much your baby is moving in your womb  A kick from your baby can be felt as a twist, turn, swish, roll, or jab  It is common to feel your baby kicking at 26 to 28 weeks of pregnancy  You may feel your baby kick as early as 20 weeks of pregnancy  Seek care immediately if:   · You feel your baby kick less as the day goes on      · You do not feel any kicks in a day  Contact your healthcare provider if:   · You feel a change in the number of kicks or movements of your baby  · You feel fewer than 10 kicks within 2 hours after counting twice  · You have questions or concerns about your baby's movements  Why measure kick counts:  Your baby's movement may provide information about your baby's health  He may move less, or not at all, if there are problems  He may move less if he does not have enough room to grow in your uterus (womb)  He may also move less if he is not getting enough oxygen or nutrition from the placenta  Tell your healthcare provider as soon as you feel a change in your baby's movements  Problems that are found earlier are easier to treat  When to measure kick counts:   · Measure kick counts at the same time every day  · Measure kick counts when your baby is awake and most active  Your baby may be most active in the evening  · Measure kick counts after a meal or snack  Your baby may be more active after you eat  Wait 2 hours after you drink liquids that contain caffeine  Caffeine can make your baby more active than usual     · You should not smoke while you are pregnant  Smoking increases the risk of health problems for you and for your baby during your pregnancy   If you do smoke, wait 2 hours to measure kick counts  Nicotine can make your baby more active than usual   How to measure kick counts:  Check that your baby is awake before you measure kick counts  You can wake up your baby by lightly pushing on your belly, walking, or drinking something cold  Your healthcare provider may tell you different ways to measure kick counts  He may tell you to do the following:  · Use a chart or clock to keep track of the time you start and finish counting  · Sit in a chair or lie on your left side  · Place your hands on the largest part of your belly  · Count until you reach 10 kicks  Write down how much time it takes to count 10 kicks  · It may take 30 minutes to 2 hours to count 10 kicks  It should not take more than 2 hours to count 10 kicks  · If you do not feel 10 kicks within 2 hours, wait 1 hour and count again  Your baby can sleep for up to 40 minutes at one time  Follow up with your healthcare provider as directed:  Write down your questions so you remember to ask them during your visits  © 2017 [facility] Information is for End User's use only and may not be sold, redistributed or otherwise used for commercial purposes  All illustrations and images included in CareNotes® are the copyrighted property of A D A M , Inc  or David Rice  The above information is an  only  It is not intended as medical advice for individual conditions or treatments  Talk to your doctor, nurse or pharmacist before following any medical regimen to see if it is safe and effective for you

## 2020-09-15 ENCOUNTER — ROUTINE PRENATAL (OUTPATIENT)
Dept: OBGYN CLINIC | Facility: CLINIC | Age: 40
End: 2020-09-15

## 2020-09-15 ENCOUNTER — TELEPHONE (OUTPATIENT)
Dept: PERINATAL CARE | Facility: CLINIC | Age: 40
End: 2020-09-15

## 2020-09-15 ENCOUNTER — TRANSCRIBE ORDERS (OUTPATIENT)
Dept: PERINATAL CARE | Facility: CLINIC | Age: 40
End: 2020-09-15

## 2020-09-15 VITALS
WEIGHT: 213.8 LBS | BODY MASS INDEX: 36.13 KG/M2 | TEMPERATURE: 97.2 F | HEART RATE: 118 BPM | DIASTOLIC BLOOD PRESSURE: 66 MMHG | SYSTOLIC BLOOD PRESSURE: 104 MMHG

## 2020-09-15 DIAGNOSIS — O99.213 OBESITY AFFECTING PREGNANCY, ANTEPARTUM, THIRD TRIMESTER: Primary | ICD-10-CM

## 2020-09-15 DIAGNOSIS — O09.523 AMA (ADVANCED MATERNAL AGE) MULTIGRAVIDA 35+, THIRD TRIMESTER: ICD-10-CM

## 2020-09-15 DIAGNOSIS — K83.1 CHOLESTASIS DURING PREGNANCY IN THIRD TRIMESTER: ICD-10-CM

## 2020-09-15 DIAGNOSIS — O26.613 CHOLESTASIS DURING PREGNANCY IN THIRD TRIMESTER: ICD-10-CM

## 2020-09-15 PROCEDURE — PNV: Performed by: NURSE PRACTITIONER

## 2020-09-15 NOTE — TELEPHONE ENCOUNTER
Attempted to reach patient by phone and left voicemail to confirm appointment for MFM ultrasound  1 support person ( must be over the age of 25) may accompany you for your appointment  If you or your support person have traveled outside the state in the past 2 weeks, please call and notify our office today #[phone]  You and your support person must wear a mask ,covering nose and mouth,during your entire visit  You and your support person will have temperature screened upon arrival     To minimize your exposure in our waiting room, please call our office prior to entering the building  Check in and rooming questions will be done via phone  We will give you directions when to enter for your appointment  Inside office # provided:  [facility]enni Oj[facility] line: [phone]  [facility] line:  [phone]  [facility] line:  [address] line:  [phone]  [place] [place] line:  [phone]  [place] line:  [phone]    IF you are not feeling well- cough, fever, shortness of breath or any flu like symptoms, contact your primary care physician or 1-[facility] Stefany Ray    Any questions with these instructions please call Maternal Fetal Medicine nurse line today @ # [phone]

## 2020-09-15 NOTE — PROGRESS NOTES
Pt is a 44 y o   33w4d  Pregnancy is complicated by AMA, obesity, RH negative, fibroid uterus, h o herpes, abn 1 hour gtt w/ 3 hr gtt with 3/4 normal values  20 MFM- bile acids of 20 = 11, considered cholestasis, 2x/w NST w/ VÍCTOR (these have been booked with MFM), started urodiol  Also seeing DIPP due to size of baby-- AC >95th %ile and EFW 93rd %ile      S>D, + FHTs, normal BP    Cholestasis  Macrosomia  RV 2w with Dr Darren Jarrell to discuss delivery planning/ consents/ GBS

## 2020-09-16 ENCOUNTER — ULTRASOUND (OUTPATIENT)
Dept: PERINATAL CARE | Facility: CLINIC | Age: 40
End: 2020-09-16
Payer: COMMERCIAL

## 2020-09-16 VITALS
WEIGHT: 211.8 LBS | HEIGHT: 65 IN | DIASTOLIC BLOOD PRESSURE: 66 MMHG | HEART RATE: 81 BPM | TEMPERATURE: 96.4 F | SYSTOLIC BLOOD PRESSURE: 121 MMHG | BODY MASS INDEX: 35.29 KG/M2

## 2020-09-16 DIAGNOSIS — O24.419 GESTATIONAL DIABETES MELLITUS (GDM) IN THIRD TRIMESTER, GESTATIONAL DIABETES METHOD OF CONTROL UNSPECIFIED: ICD-10-CM

## 2020-09-16 DIAGNOSIS — Z3A.33 33 WEEKS GESTATION OF PREGNANCY: ICD-10-CM

## 2020-09-16 DIAGNOSIS — O99.213 OBESITY AFFECTING PREGNANCY IN THIRD TRIMESTER: ICD-10-CM

## 2020-09-16 DIAGNOSIS — O99.810 ABNORMAL GLUCOSE TOLERANCE AFFECTING PREGNANCY, ANTEPARTUM: Primary | ICD-10-CM

## 2020-09-16 DIAGNOSIS — O26.613 CHOLESTASIS DURING PREGNANCY IN THIRD TRIMESTER: Primary | ICD-10-CM

## 2020-09-16 DIAGNOSIS — K83.1 CHOLESTASIS DURING PREGNANCY IN THIRD TRIMESTER: Primary | ICD-10-CM

## 2020-09-16 PROCEDURE — 76815 OB US LIMITED FETUS(S): CPT | Performed by: OBSTETRICS & GYNECOLOGY

## 2020-09-16 PROCEDURE — 59025 FETAL NON-STRESS TEST: CPT | Performed by: OBSTETRICS & GYNECOLOGY

## 2020-09-16 RX ORDER — LANCETS
EACH MISCELLANEOUS
Qty: 102 EACH | Refills: 3 | Status: SHIPPED | OUTPATIENT
Start: 2020-09-16 | End: 2022-01-22 | Stop reason: ALTCHOICE

## 2020-09-16 RX ORDER — BLOOD-GLUCOSE METER
EACH MISCELLANEOUS
Qty: 1 KIT | Refills: 0 | Status: SHIPPED | OUTPATIENT
Start: 2020-09-16 | End: 2022-01-22 | Stop reason: ALTCHOICE

## 2020-09-16 RX ORDER — BLOOD SUGAR DIAGNOSTIC
STRIP MISCELLANEOUS
Qty: 100 EACH | Refills: 3 | Status: SHIPPED | OUTPATIENT
Start: 2020-09-16 | End: 2022-01-22 | Stop reason: ALTCHOICE

## 2020-09-16 NOTE — PATIENT INSTRUCTIONS

## 2020-09-18 ENCOUNTER — TELEPHONE (OUTPATIENT)
Dept: PERINATAL CARE | Facility: CLINIC | Age: 40
End: 2020-09-18

## 2020-09-18 ENCOUNTER — TELEMEDICINE (OUTPATIENT)
Dept: PERINATAL CARE | Facility: CLINIC | Age: 40
End: 2020-09-18
Payer: COMMERCIAL

## 2020-09-18 ENCOUNTER — DOCUMENTATION (OUTPATIENT)
Dept: PERINATAL CARE | Facility: CLINIC | Age: 40
End: 2020-09-18

## 2020-09-18 DIAGNOSIS — R73.09 ELEVATED GLUCOSE TOLERANCE TEST: ICD-10-CM

## 2020-09-18 DIAGNOSIS — Z3A.34 34 WEEKS GESTATION OF PREGNANCY: ICD-10-CM

## 2020-09-18 DIAGNOSIS — O24.419 GESTATIONAL DIABETES MELLITUS (GDM) IN THIRD TRIMESTER, GESTATIONAL DIABETES METHOD OF CONTROL UNSPECIFIED: Primary | ICD-10-CM

## 2020-09-18 DIAGNOSIS — O36.63X0 EXCESSIVE FETAL GROWTH AFFECTING MANAGEMENT OF PREGNANCY IN THIRD TRIMESTER, SINGLE OR UNSPECIFIED FETUS: ICD-10-CM

## 2020-09-18 DIAGNOSIS — O99.213 OBESITY AFFECTING PREGNANCY, ANTEPARTUM, THIRD TRIMESTER: ICD-10-CM

## 2020-09-18 PROCEDURE — G0108 DIAB MANAGE TRN  PER INDIV: HCPCS

## 2020-09-18 NOTE — PROGRESS NOTES
Demographics:  Language: English  Ethnicity: White/ [address] [place] [address]    Education/Occupation:  Highest grade completed: Some College  Occupation: Clerical and   Employer Name: [employer] (working from home now )  Hours worked per week: Full Time (40 hours/week)  Shift worked: Other (8:15 am-4:45 pm)    Personal & Family History:  Personal history of diabetes? no  Family members with diabetes:  Cousin had GDM    Pregnancy History:  How many total pregnancies have you had? 2  How many children do you have? 1  Have you had a baby weighing larger than 9 lbs? no  Are you having swelling or fluid retention? Sometimes when standing on feet for a long time  Have you been placed on bedrest? no    Physical Activity:  Do you exercise? no    Nutrition Questionnaire: How many meals do you eat daily? Other varies on hunger sometimes don't eat standard meals except dinner  List times of meals: Other typically eating 9am; 1 to 1:30 PM; 6 to 7:30 PM  How many snacks do you eat daily? Other depends on hunger; eats when bored   What type of diet are you following at home? Regular  Do you have special or ethnic dietary preferences? no  Do you have any food allergies or intolerances? no  When was your last meal? 9 am  List what you ate and the amounts: dry cheerios with water    Learning preferences: How do you learn best? Reading  How do you rate your health? Good  Who is your primary support person? Significant Other  How do you cope with stress? Patient responded with eating  Do you have any cultural or Nondenominational beliefs we should be aware of? no  How do you feel the diabetes diagnosis will affect the rest of your pregnancy?  Not sure  Do you receive WIC or food stamps? no

## 2020-09-18 NOTE — TELEPHONE ENCOUNTER
Attempted to reach patient by phone and left voicemail to confirm appointment for MFM ultrasound  1 support person ( must be over the age of 25) may accompany you for your appointment  If you or your support person have traveled outside the state in the past 2 weeks, please call and notify our office today #[phone]  You and your support person must wear a mask ,covering nose and mouth,during your entire visit  You and your support person will have temperature screened upon arrival     To minimize your exposure in our waiting room, please call our office prior to entering the building  Check in and rooming questions will be done via phone  We will give you directions when to enter for your appointment  Inside office # provided:  Anushka Morales line: [phone]  [place] line:  [phone]  [facility] line:  [address] line:  [phone]  [facility] line:  [phone]  [place] line:  [phone]    IF you are not feeling well- cough, fever, shortness of breath or any flu like symptoms, contact your primary care physician or 1-[facility] Sania Dhaliwal    Any questions with these instructions please call Maternal Fetal Medicine nurse line today @ # [phone]

## 2020-09-18 NOTE — PROGRESS NOTES
Virtual Regular Visit      Assessment/Plan:    Problem List Items Addressed This Visit        Other    Elevated glucose tolerance test    Excessive fetal growth affecting management of pregnancy in third trimester      Other Visit Diagnoses     32 weeks gestation of pregnancy                   Reason for visit is   Chief Complaint   Patient presents with    Gestational Diabetes    Patient Education    Virtual Regular Visit        Encounter provider Evelin Rodriguez    Provider located at [address] [place] [address]  [address] [facility] [address] [phone]      Recent Visits  Date Type Provider Dept   09/15/20 Telephone [facility]   09/15/20 Telephone [place] 9   09/15/20 Telephone Kimberly Vaughan, [facility] recent visits within past 7 days and meeting all other requirements     Today's Visits  Date Type Provider Dept   09/18/20 Telephone Nagi Mccord, [address] [place] [address]     09/18/20 [address]   Showing today's visits and meeting all other requirements     Future Appointments  No visits were found meeting these conditions  Showing future appointments within next 150 days and meeting all other requirements        The patient was identified by name and date of birth  Ade Client was informed that this is a telemedicine visit and that the visit is being conducted through Medlio  My office door was closed  No one else was in the room  She acknowledged consent and understanding of privacy and security of the video platform  The patient has agreed to participate and understands they can discontinue the visit at any time  Patient is aware this is a billable service  Subjective  Ade Client is a 44 y  o pregnant female         HPI     Past Medical History:   Diagnosis Date    Abnormal Pap smear of cervix     4/2017-wnl, HPV + (18/45), colpo; 8/2018-ASCUS + HPV: 9/18-colpo squamous atypia; 8/2019: wnl, HPV neg    Attention disturbance     last assessed: 8/1/2017    BV (bacterial vaginosis)     Cholestasis during pregnancy in third trimester 9/9/2020    Fibroid     4 cm lower uterine segment    Headache     Herpes     HPV (human papilloma virus) infection     Need for prophylactic vaccination against human papillomavirus (HPV) types 6, 11, 16, and 18     declines    Varicella        Past Surgical History:   Procedure Laterality Date    COLONOSCOPY  06/2019    wnl    DILATION AND CURETTAGE, DIAGNOSTIC / THERAPEUTIC      ETOP       Current Outpatient Medications   Medication Sig Dispense Refill    Accu-Chek FastClix Lancets MISC Use 4 a day and as directed  102 each 3    Accu-Chek Guide test strip Test 4 times a day and as instructed  Gestational diabetes  100 each 3    albuterol (PROVENTIL HFA) 90 mcg/act inhaler Inhale 2 puffs every 6 (six) hours as needed (cough, chest tightness, shortness of breath) (Patient not taking: Reported on 9/15/2020) 18 g 0    Blood Glucose Monitoring Suppl (Accu-Chek Guide Me) w/Device KIT Dispense 1 kit per insurance formulary  1 kit 0    PRENATAL VIT-DOCUSATE-IRON-FA PO Take by mouth      ursodiol (ACTIGALL) 300 mg capsule Take 1 capsule (300 mg total) by mouth 2 (two) times a day 60 capsule 1    valACYclovir (VALTREX) 1,000 mg tablet Take 1 tablet (1,000 mg total) by mouth daily 90 tablet 3     No current facility-administered medications for this visit  Allergies   Allergen Reactions    Amoxicillin Rash     Category: Allergy; Review of Systems    Video Exam    There were no vitals filed for this visit  Physical Exam     I spent 60 minutes with patient today in which greater than 50% of the time was spent in counseling/coordination of care regarding excessive fetal growth affecting management of pregnancy in 3rd trimester per 9/9/20 US   One abnormal value of 3 hr GTT      VIRTUAL VISIT DISCLAIMER    Lashell Frederick acknowledges that she has consented to an online visit or consultation  She understands that the online visit is based solely on information provided by her, and that, in the absence of a face-to-face physical evaluation by the physician, the diagnosis she receives is both limited and provisional in terms of accuracy and completeness  This is not intended to replace a full medical face-to-face evaluation by the physician  Lashell Frederick understands and accepts these terms  20  Lashell Frederick   1980  Estimated Date of Delivery: 10/30/20   EGA: 34w0d  Referring Provider: Ed Johansen MD    Thank you for referring your patient to the Diabetes and Pregnancy Program at [address] [place] [address]  The patient attended class 1 virtual visit and patient received the following education:     Pathophysiology of diabetes and pregnancy  This includes maternal-fetal complications such as fetal macrosomia,  hypoglycemia, polyhydramnios, increased incidence of  section, pre-term labor and in severe cases, fetal demise and stillbirth   Instruction on diet and glucometer use was provided  Self-monitoring of blood glucose levels: fasting (goal 60mg/dl to 90mg/dl) and two hours after the start of the meal less (goal less than 120mg/dl)  The patient was provided with a Accu-chek Guide blood glucose meter and supplies   Medical Nutrition Therapy for diabetes and pregnancy  The patient was provided with a 2200 calorie meal plan and the following was reviewed:     o Basic review of macronutrients   o Meal pattern should consist of three small meals and three snacks daily  o Carbohydrate gram amounts per meal   o Instructions on how to read a food label  o Appropriate serving sizes for carbohydrates and proteins  o Incorporating protein at each meal and snack    o Maintain a three day food diary and bring to class 2    Report blood glucose levels to the [address] weekly or as directed:  o Phone : [phone]  If no response in 24 hours, call [phone]   o Fax: [phone]  o Corazon  The patient is scheduled to attend class 2 virtual visit on 9/25/20  Additionally, fetal ultrasound evaluation by the Perinatologist has been scheduled to assure continuity of care  Patient Stated Goal: "I will eat 3 meals and 3 snacks each day, including protein at each"  Diabetes Self Management Support Plan outside of ongoing care: Spouse/Family boyfriend    Please contact the Diabetes and Pregnancy Program at [phone] if you have any questions  Time spent with patient 11;00 AM-12:00 PM; time spent face to face counseling greater than 50% of the appointment      Martha Dill RD,LDN,CDE  Diabetes Educator   Diabetes and Pregnancy Program

## 2020-09-21 ENCOUNTER — ROUTINE PRENATAL (OUTPATIENT)
Dept: PERINATAL CARE | Facility: CLINIC | Age: 40
End: 2020-09-21
Payer: COMMERCIAL

## 2020-09-21 VITALS
BODY MASS INDEX: 35.85 KG/M2 | HEIGHT: 65 IN | HEART RATE: 83 BPM | WEIGHT: 215.2 LBS | TEMPERATURE: 96.5 F | SYSTOLIC BLOOD PRESSURE: 123 MMHG | DIASTOLIC BLOOD PRESSURE: 57 MMHG

## 2020-09-21 DIAGNOSIS — O26.613 CHOLESTASIS DURING PREGNANCY IN THIRD TRIMESTER: Primary | ICD-10-CM

## 2020-09-21 DIAGNOSIS — Z3A.34 34 WEEKS GESTATION OF PREGNANCY: ICD-10-CM

## 2020-09-21 DIAGNOSIS — K83.1 CHOLESTASIS DURING PREGNANCY IN THIRD TRIMESTER: Primary | ICD-10-CM

## 2020-09-21 PROBLEM — E66.9 OBESITY (BMI 30.0-34.9): Status: RESOLVED | Noted: 2018-08-16 | Resolved: 2020-09-21

## 2020-09-21 PROBLEM — E66.811 OBESITY (BMI 30.0-34.9): Status: RESOLVED | Noted: 2018-08-16 | Resolved: 2020-09-21

## 2020-09-21 PROBLEM — O24.419 GESTATIONAL DIABETES: Status: ACTIVE | Noted: 2020-08-05

## 2020-09-21 PROCEDURE — 1036F TOBACCO NON-USER: CPT | Performed by: OBSTETRICS & GYNECOLOGY

## 2020-09-21 PROCEDURE — 99212 OFFICE O/P EST SF 10 MIN: CPT | Performed by: OBSTETRICS & GYNECOLOGY

## 2020-09-21 PROCEDURE — 59025 FETAL NON-STRESS TEST: CPT | Performed by: OBSTETRICS & GYNECOLOGY

## 2020-09-21 NOTE — PROGRESS NOTES
Sancho Diego: Ms Xavi Richmond was seen today at 34w3d for NST (found under the pregnancy episode) which I reviewed the RN assessment and agree  I spent 10 minutes of face to face time with Monalisa Dorsey reviewing her questions today  She inquired about whether she needs to continue following with diabetes in pregnancy program (she was referred for excessive fetal growth with 1/4 values abnormal on 3 hour glucose tolerance test)  I advised her to complete diabetic education and continue testing, as some of her fasting values are as high as 90  We reviewed the importance of glycemic control in pregnancy, and fetal/ risks of hypoglycemia  I would recommend she continue testing for the duration of pregnancy and be treated as GDMA1 if some of her fasting values are borderline  She inquired about when fetal growth would be re-assessed, which is typically every 3-4 weeks, and next scheduled on 10/2/20  She also inquired about why nonstress testing is indicated, and we reviewed risk of stillbirth with cholestasis  Fetal kick counting was reiterated  We discussed suggested delivery timing of 36 0/7 to 37 0/7 weeks gestation for cholestasis      Please don't hesitate to contact our office with any concerns or questions   -Forest Decker MD

## 2020-09-22 ENCOUNTER — TELEPHONE (OUTPATIENT)
Dept: PERINATAL CARE | Facility: CLINIC | Age: 40
End: 2020-09-22

## 2020-09-22 ENCOUNTER — ROUTINE PRENATAL (OUTPATIENT)
Dept: OBGYN CLINIC | Facility: CLINIC | Age: 40
End: 2020-09-22

## 2020-09-22 VITALS
OXYGEN SATURATION: 98 % | HEART RATE: 77 BPM | WEIGHT: 214.2 LBS | TEMPERATURE: 97.9 F | SYSTOLIC BLOOD PRESSURE: 106 MMHG | DIASTOLIC BLOOD PRESSURE: 58 MMHG | BODY MASS INDEX: 36.2 KG/M2

## 2020-09-22 DIAGNOSIS — O09.523 AMA (ADVANCED MATERNAL AGE) MULTIGRAVIDA 35+, THIRD TRIMESTER: ICD-10-CM

## 2020-09-22 DIAGNOSIS — O26.893 RH NEGATIVE STATUS DURING PREGNANCY IN THIRD TRIMESTER: ICD-10-CM

## 2020-09-22 DIAGNOSIS — Z67.91 RH NEGATIVE STATUS DURING PREGNANCY IN THIRD TRIMESTER: ICD-10-CM

## 2020-09-22 DIAGNOSIS — O36.63X0 EXCESSIVE FETAL GROWTH AFFECTING MANAGEMENT OF PREGNANCY IN THIRD TRIMESTER, SINGLE OR UNSPECIFIED FETUS: ICD-10-CM

## 2020-09-22 DIAGNOSIS — K83.1 CHOLESTASIS DURING PREGNANCY IN THIRD TRIMESTER: Primary | ICD-10-CM

## 2020-09-22 DIAGNOSIS — O98.513 HERPES SIMPLEX TYPE 2 (HSV-2) INFECTION AFFECTING PREGNANCY, ANTEPARTUM, THIRD TRIMESTER: ICD-10-CM

## 2020-09-22 DIAGNOSIS — B00.9 HERPES SIMPLEX TYPE 2 (HSV-2) INFECTION AFFECTING PREGNANCY, ANTEPARTUM, THIRD TRIMESTER: ICD-10-CM

## 2020-09-22 DIAGNOSIS — O26.613 CHOLESTASIS DURING PREGNANCY IN THIRD TRIMESTER: Primary | ICD-10-CM

## 2020-09-22 DIAGNOSIS — O99.213 OBESITY AFFECTING PREGNANCY, ANTEPARTUM, THIRD TRIMESTER: ICD-10-CM

## 2020-09-22 PROCEDURE — PNV: Performed by: OBSTETRICS & GYNECOLOGY

## 2020-09-22 NOTE — PROGRESS NOTES
Pt is a 44 y o   34w4d  Pregnancy is complicated by AMA, Obesity, RH negative, fibroid uterus, h o herpes, abnormal 1 hour gtt and 3 hour GTT with 3/4 normal values, suspected macrosomia, cholestasis of pregnancy  Pt reports +FM  Denies vb, lof  Notes occasional ctx  PTL precautions and [facility] reviewed  Reports that she still has itching despite use of ursodiol, but it is tolerable--reviewed with patient that IOL is scheduled for 10/2 at 0630  Recommend [facility] for FLM as she will be 36 weeks gestation--will coordinate with M  Pt reports she has twice weekly  testing through    Pt also reports she was advised to check her bg levels as suspected macrosomia is noted  She reports her FBG is in the high 80's and PP is  and all values are in the normal range    GBS collected  Consents reviewed and signed  Birth plan reviewed and signed  SVE: FT/50/-3, soft, posterior 04-Dec-2018 22:30

## 2020-09-22 NOTE — TELEPHONE ENCOUNTER
Dr Belkis Holbrook called, patient will need betamethadone injection x 2 before she delivers the baby  Plan is to deliver her baby at 42 wks  Bhargav Cao, nurse aware she will get one in [place] 9/24/20 and then another on Friday when she comes in for her nst/lindsay  Paient aware

## 2020-09-23 ENCOUNTER — TELEPHONE (OUTPATIENT)
Dept: PERINATAL CARE | Facility: OTHER | Age: 40
End: 2020-09-23

## 2020-09-23 NOTE — TELEPHONE ENCOUNTER
Attempted to reach patient by phone and left voicemail to confirm appointment for MFM ultrasound  1 support person (must be over the age of 15) may accompany you for your appointment  If you or your support person have traveled outside the state in the past 2 weeks, please call and notify our office today #[phone]  You and your support person must wear a mask, covering nose and mouth,during your entire visit  You and your support person will have temperature screened upon arrival     To minimize your exposure in our waiting room, please call our office prior to entering the building  Check in and rooming questions will be done via phone  We will give you directions when to enter for your appointment  Inside office # provided:    [place] line:  [phone]    IF you are not feeling well- cough, fever, shortness of breath or any flu like symptoms, contact your primary care physician or -[facility] Shameka Franklin    Any questions with these instructions please call Maternal Fetal Medicine nurse line today @ # [phone]

## 2020-09-24 ENCOUNTER — TELEPHONE (OUTPATIENT)
Dept: PERINATAL CARE | Facility: CLINIC | Age: 40
End: 2020-09-24

## 2020-09-24 ENCOUNTER — CLINICAL SUPPORT (OUTPATIENT)
Dept: PERINATAL CARE | Facility: CLINIC | Age: 40
End: 2020-09-24
Payer: COMMERCIAL

## 2020-09-24 ENCOUNTER — DOCUMENTATION (OUTPATIENT)
Dept: PERINATAL CARE | Facility: CLINIC | Age: 40
End: 2020-09-24

## 2020-09-24 DIAGNOSIS — Z3A.34 34 WEEKS GESTATION OF PREGNANCY: ICD-10-CM

## 2020-09-24 DIAGNOSIS — K83.1 CHOLESTASIS DURING PREGNANCY IN THIRD TRIMESTER: Primary | ICD-10-CM

## 2020-09-24 DIAGNOSIS — O26.613 CHOLESTASIS DURING PREGNANCY IN THIRD TRIMESTER: Primary | ICD-10-CM

## 2020-09-24 PROCEDURE — 96372 THER/PROPH/DIAG INJ SC/IM: CPT

## 2020-09-24 RX ORDER — BETAMETHASONE SODIUM PHOSPHATE AND BETAMETHASONE ACETATE 3; 3 MG/ML; MG/ML
12 INJECTION, SUSPENSION INTRA-ARTICULAR; INTRALESIONAL; INTRAMUSCULAR; SOFT TISSUE EVERY 24 HOURS
Status: COMPLETED | OUTPATIENT
Start: 2020-09-24 | End: 2020-09-25

## 2020-09-24 RX ADMIN — BETAMETHASONE SODIUM PHOSPHATE AND BETAMETHASONE ACETATE 12 MG: 3; 3 INJECTION, SUSPENSION INTRA-ARTICULAR; INTRALESIONAL; INTRAMUSCULAR at 19:01

## 2020-09-24 NOTE — TELEPHONE ENCOUNTER
Spoke with patient and confirmed appointment with MFM  1 support person ( must be over age of 25) may accompany patient  Will you and your support person be able to wear a mask ,without a valve , during entire appointment? yes   To minimize your exposure in our waiting area,check in and rooming questions will be done via phone  When you arrive in the parking lot please call the following inside line # prior to entering office:    [place] [phone] line: 280 [facility] line:  [phone] line: [phone]  [place] line:  [phone]  [place] line: [phone]    Have you or your support person traveled outside the state in the last 2 weeks?no   If yes, what state did you travel to? no     Do you or your support person have:  Fever or flu- like symptoms?no  Symptoms of upper respiratory infection like runny nose, sore throat or cough? no  Do you have new headache that you have not had in the past?no  Have you experienced any new shortness of breath recently?no  Do you have any new loss of taste or smell?no  Do you have any new diarrhea, nausea or vomiting?no  Have you recently been in contact with anyone who has been sick or diagnosed with COVID-19 infection?no  Have you been recommended to quarantine because of an exposure to a confirmed positive COVID19 person?no  You and your support person will have temperature screening upon arrival   Patient verbalized understanding of all instructions

## 2020-09-24 NOTE — PROGRESS NOTES
Date:  20  RE: Conrado Beckwith    : 1980  Estimated Date of Delivery: 10/30/20  EGA: 35w0d  OB/GYN: Zulma Rodriguez      Per patient [employer] message: Mike Holly noticed that the 2 mornings it was a little high for the fasting, I am thinking it's because the night before I had a banana for my snack, and maybe that wasn't a good idea  Current regimen:  2200 calorie gestational diabetes meal plan; 3 meals and 3 snacks daily, including a combination of carbohydrate, protein and fat  SMBG 4 times per day; fasting and 2 hrs pp with an Accu-Chek Guide blood glucose meter  Plan: Attempted to call patient to review plans  Voicemail message left for patient and [employer]i [employer] message sent reviewing plan  Noted in medical record plans for betamethadone injection x 2 before she delivers the baby  Plan is to deliver her baby at 42 wks  Induction is scheduled for 10/2  Noted  VÍCTOR normal and NST reactive with no decelerations  Patient cancelled class 2 virtual visit today given she will be delivering next week  Advised patient that 2 hr pp values are within target range  Patient reported the 2 fasting blood sugars above target may have been due to eating a banana for her bedtime snack  Instructed patient to pair CHO with protein in all meals and snacks  Protein suggestions were provided  Continue diet and testing  Advised patient FBG should follow no longer than an 8-10 hr fast    If there are no exercise restrictions from OB, walking 20-30 minutes is suggested after dinner meal           20 US impressions: possible macrasomia; EFW 93rd%tile and AC >95th%tile for gestational age; VÍCTOR normal          Date due to report next:  Tuesday, ; sooner if blood sugars are consistently above target range      [facility]  Diabetes Education  Diabetes and Pregnancy Program

## 2020-09-25 ENCOUNTER — ULTRASOUND (OUTPATIENT)
Dept: PERINATAL CARE | Facility: CLINIC | Age: 40
End: 2020-09-25
Payer: COMMERCIAL

## 2020-09-25 ENCOUNTER — TELEPHONE (OUTPATIENT)
Dept: PERINATAL CARE | Facility: CLINIC | Age: 40
End: 2020-09-25

## 2020-09-25 VITALS
BODY MASS INDEX: 35.62 KG/M2 | WEIGHT: 213.8 LBS | DIASTOLIC BLOOD PRESSURE: 56 MMHG | HEIGHT: 65 IN | SYSTOLIC BLOOD PRESSURE: 119 MMHG | TEMPERATURE: 98.4 F

## 2020-09-25 DIAGNOSIS — Z3A.35 35 WEEKS GESTATION OF PREGNANCY: ICD-10-CM

## 2020-09-25 DIAGNOSIS — O26.613 CHOLESTASIS DURING PREGNANCY IN THIRD TRIMESTER: Primary | ICD-10-CM

## 2020-09-25 DIAGNOSIS — K83.1 CHOLESTASIS DURING PREGNANCY IN THIRD TRIMESTER: Primary | ICD-10-CM

## 2020-09-25 PROCEDURE — 76815 OB US LIMITED FETUS(S): CPT | Performed by: OBSTETRICS & GYNECOLOGY

## 2020-09-25 PROCEDURE — 59025 FETAL NON-STRESS TEST: CPT | Performed by: OBSTETRICS & GYNECOLOGY

## 2020-09-25 RX ADMIN — BETAMETHASONE SODIUM PHOSPHATE AND BETAMETHASONE ACETATE 12 MG: 3; 3 INJECTION, SUSPENSION INTRA-ARTICULAR; INTRALESIONAL; INTRAMUSCULAR at 16:03

## 2020-09-25 NOTE — TELEPHONE ENCOUNTER
Spoke with patient and confirmed appointment with MFM  1 support person ( must be over age of 25) may accompany patient  Will you and your support person be able to wear a mask ,without a valve , during entire appointment? yes   To minimize your exposure in our waiting area,check in and rooming questions will be done via phone  When you arrive in the parking lot please call the following inside line # prior to entering office:    [place] [phone] line: 280 [facility] line:  [address] line: [phone]  [place] line:  [phone]  [place] line: [phone]    Have you or your support person traveled outside the state in the last 2 weeks?no   If yes, what state did you travel to? no     Do you or your support person have:  Fever or flu- like symptoms?no  Symptoms of upper respiratory infection like runny nose, sore throat or cough? no  Do you have new headache that you have not had in the past?no  Have you experienced any new shortness of breath recently?no  Do you have any new loss of taste or smell?no  Do you have any new diarrhea, nausea or vomiting?no  Have you recently been in contact with anyone who has been sick or diagnosed with COVID-19 infection?no  Have you been recommended to quarantine because of an exposure to a confirmed positive COVID19 person?no  You and your support person will have temperature screening upon arrival   Patient verbalized understanding of all instructions

## 2020-09-25 NOTE — LETTER
September 25, 2020     Levi Biyasmine, [address]  [address] [place] [address]    Patient: Angie Hung   YOB: 1980   Date of Visit: 9/25/2020       Dear Dr Rosanna Bull: Thank you for referring Darline Chapman to me for evaluation  Below are my notes for this consultation  If you have questions, please do not hesitate to call me  I look forward to following your patient along with you  Sincerely,        Alvaro Kline RN        CC: No Recipients  Jericho Murry MD  9/25/2020  3:13 PM  Sign when Signing Visit  NST is reactive   Jericho Murry MD

## 2020-09-26 ENCOUNTER — TELEPHONE (OUTPATIENT)
Dept: OTHER | Facility: OTHER | Age: 40
End: 2020-09-26

## 2020-09-26 ENCOUNTER — TELEPHONE (OUTPATIENT)
Dept: LABOR AND DELIVERY | Facility: HOSPITAL | Age: 40
End: 2020-09-26

## 2020-09-26 NOTE — TELEPHONE ENCOUNTER
Pt called bc she noticed that her feet are swollen   Advised to keep them  Up     There is no other associated symptoms  She has no headache blurry vision     BP is normal yesterday     Will see the doctor on 9/29

## 2020-09-28 ENCOUNTER — ROUTINE PRENATAL (OUTPATIENT)
Dept: PERINATAL CARE | Facility: CLINIC | Age: 40
End: 2020-09-28
Payer: COMMERCIAL

## 2020-09-28 VITALS
BODY MASS INDEX: 36.42 KG/M2 | SYSTOLIC BLOOD PRESSURE: 114 MMHG | WEIGHT: 218.6 LBS | DIASTOLIC BLOOD PRESSURE: 63 MMHG | HEART RATE: 72 BPM | TEMPERATURE: 98.1 F | HEIGHT: 65 IN

## 2020-09-28 DIAGNOSIS — O26.613 CHOLESTASIS DURING PREGNANCY IN THIRD TRIMESTER: Primary | ICD-10-CM

## 2020-09-28 DIAGNOSIS — Z3A.35 35 WEEKS GESTATION OF PREGNANCY: ICD-10-CM

## 2020-09-28 DIAGNOSIS — K83.1 CHOLESTASIS DURING PREGNANCY IN THIRD TRIMESTER: Primary | ICD-10-CM

## 2020-09-28 PROBLEM — O99.820 GROUP B STREPTOCOCCUS CARRIER, ANTEPARTUM: Status: ACTIVE | Noted: 2020-09-28

## 2020-09-28 LAB — EXTERNAL GROUP B STREP ANTIGEN: POSITIVE

## 2020-09-28 PROCEDURE — 59025 FETAL NON-STRESS TEST: CPT | Performed by: OBSTETRICS & GYNECOLOGY

## 2020-09-28 NOTE — PROGRESS NOTES
Sancho Diego: Ms Kayla Diaz was seen today at 35w3d for NST (found under the pregnancy episode) which I reviewed the RN assessment and agree  Please don't hesitate to contact our office with any concerns or questions    Natalie Nieto MD

## 2020-09-29 ENCOUNTER — ROUTINE PRENATAL (OUTPATIENT)
Dept: OBGYN CLINIC | Facility: CLINIC | Age: 40
End: 2020-09-29

## 2020-09-29 VITALS
WEIGHT: 217.8 LBS | DIASTOLIC BLOOD PRESSURE: 78 MMHG | HEART RATE: 74 BPM | TEMPERATURE: 98.3 F | HEIGHT: 65 IN | OXYGEN SATURATION: 97 % | BODY MASS INDEX: 36.29 KG/M2 | SYSTOLIC BLOOD PRESSURE: 108 MMHG

## 2020-09-29 DIAGNOSIS — O09.523 AMA (ADVANCED MATERNAL AGE) MULTIGRAVIDA 35+, THIRD TRIMESTER: ICD-10-CM

## 2020-09-29 DIAGNOSIS — O24.410 DIET CONTROLLED GESTATIONAL DIABETES MELLITUS (GDM) IN THIRD TRIMESTER: ICD-10-CM

## 2020-09-29 DIAGNOSIS — O26.613 CHOLESTASIS DURING PREGNANCY IN THIRD TRIMESTER: Primary | ICD-10-CM

## 2020-09-29 DIAGNOSIS — O26.893 RH NEGATIVE STATUS DURING PREGNANCY IN THIRD TRIMESTER: ICD-10-CM

## 2020-09-29 DIAGNOSIS — O36.63X0 EXCESSIVE FETAL GROWTH AFFECTING MANAGEMENT OF PREGNANCY IN THIRD TRIMESTER, SINGLE OR UNSPECIFIED FETUS: ICD-10-CM

## 2020-09-29 DIAGNOSIS — K83.1 CHOLESTASIS DURING PREGNANCY IN THIRD TRIMESTER: Primary | ICD-10-CM

## 2020-09-29 DIAGNOSIS — B00.9 HERPES SIMPLEX TYPE 2 (HSV-2) INFECTION AFFECTING PREGNANCY, ANTEPARTUM, THIRD TRIMESTER: ICD-10-CM

## 2020-09-29 DIAGNOSIS — D25.9 UTERINE FIBROID DURING PREGNANCY, ANTEPARTUM: ICD-10-CM

## 2020-09-29 DIAGNOSIS — Z67.91 RH NEGATIVE STATUS DURING PREGNANCY IN THIRD TRIMESTER: ICD-10-CM

## 2020-09-29 DIAGNOSIS — O99.213 OBESITY AFFECTING PREGNANCY, ANTEPARTUM, THIRD TRIMESTER: ICD-10-CM

## 2020-09-29 DIAGNOSIS — O34.10 UTERINE FIBROID DURING PREGNANCY, ANTEPARTUM: ICD-10-CM

## 2020-09-29 DIAGNOSIS — O98.513 HERPES SIMPLEX TYPE 2 (HSV-2) INFECTION AFFECTING PREGNANCY, ANTEPARTUM, THIRD TRIMESTER: ICD-10-CM

## 2020-09-29 PROCEDURE — PNV: Performed by: OBSTETRICS & GYNECOLOGY

## 2020-09-29 NOTE — PROGRESS NOTES
Pt is a 44 y o   35w4d  Pregnancy is complicated by AMA ,obesity, RH negative, fibroid uterus, h o herpes, abnormal 1 hour gtt with 3/4 normal values on 3 hour gtt, macrosomia, cholestasis of pregnancy, GBS carrier  Pt reports +FM  Denies vb, lof, Notes occ ctx   PTL precautions and fkc reviewed  Had steroids for FLM last week at   Reports has been checking bg levels and fbg increased to mid 90's after steroids, but otherwise they are wnl  Has IOL scheduled on Friday at 0630  GBS + and reviewed with patient

## 2020-10-02 ENCOUNTER — HOSPITAL ENCOUNTER (INPATIENT)
Facility: HOSPITAL | Age: 40
LOS: 3 days | Discharge: HOME | End: 2020-10-05
Attending: OBSTETRICS & GYNECOLOGY | Admitting: OBSTETRICS & GYNECOLOGY
Payer: COMMERCIAL

## 2020-10-02 ENCOUNTER — ANESTHESIA (INPATIENT)
Dept: ANESTHESIOLOGY | Facility: HOSPITAL | Age: 40
End: 2020-10-02
Payer: COMMERCIAL

## 2020-10-02 ENCOUNTER — HOSPITAL ENCOUNTER (OUTPATIENT)
Dept: LABOR AND DELIVERY | Facility: HOSPITAL | Age: 40
Discharge: HOME | End: 2020-10-02
Payer: COMMERCIAL

## 2020-10-02 ENCOUNTER — ANESTHESIA EVENT (INPATIENT)
Dept: ANESTHESIOLOGY | Facility: HOSPITAL | Age: 40
End: 2020-10-02
Payer: COMMERCIAL

## 2020-10-02 DIAGNOSIS — O26.613 CHOLESTASIS DURING PREGNANCY IN THIRD TRIMESTER: ICD-10-CM

## 2020-10-02 DIAGNOSIS — Z3A.36 36 WEEKS GESTATION OF PREGNANCY: Primary | ICD-10-CM

## 2020-10-02 DIAGNOSIS — K83.1 CHOLESTASIS DURING PREGNANCY IN THIRD TRIMESTER: ICD-10-CM

## 2020-10-02 LAB
ABO GROUP BLD: NORMAL
BLD GP AB SCN SERPL QL: POSITIVE
BLOOD GROUP ANTIBODIES SERPL: NORMAL
ERYTHROCYTE [DISTWIDTH] IN BLOOD BY AUTOMATED COUNT: 11.8 % (ref 11.6–15.1)
GLUCOSE SERPL-MCNC: 69 MG/DL (ref 65–140)
GLUCOSE SERPL-MCNC: 70 MG/DL (ref 65–140)
GLUCOSE SERPL-MCNC: 83 MG/DL (ref 65–140)
GLUCOSE SERPL-MCNC: 91 MG/DL (ref 65–140)
HCT VFR BLD AUTO: 33.5 % (ref 34.8–46.1)
HGB BLD-MCNC: 11 G/DL (ref 11.5–15.4)
MCH RBC QN AUTO: 31.3 PG (ref 26.8–34.3)
MCHC RBC AUTO-ENTMCNC: 32.8 G/DL (ref 31.4–37.4)
MCV RBC AUTO: 95 FL (ref 82–98)
PLATELET # BLD AUTO: 264 THOUSANDS/UL (ref 149–390)
PMV BLD AUTO: 10.5 FL (ref 8.9–12.7)
RBC # BLD AUTO: 3.51 MILLION/UL (ref 3.81–5.12)
RH BLD: NEGATIVE
RPR SER QL: NORMAL
SPECIMEN EXPIRATION DATE: NORMAL
WBC # BLD AUTO: 10.57 THOUSAND/UL (ref 4.31–10.16)

## 2020-10-02 PROCEDURE — 82948 REAGENT STRIP/BLOOD GLUCOSE: CPT

## 2020-10-02 PROCEDURE — 99024 POSTOP FOLLOW-UP VISIT: CPT | Performed by: OBSTETRICS & GYNECOLOGY

## 2020-10-02 PROCEDURE — 86900 BLOOD TYPING SEROLOGIC ABO: CPT | Performed by: OBSTETRICS & GYNECOLOGY

## 2020-10-02 PROCEDURE — 85027 COMPLETE CBC AUTOMATED: CPT | Performed by: OBSTETRICS & GYNECOLOGY

## 2020-10-02 PROCEDURE — 86850 RBC ANTIBODY SCREEN: CPT | Performed by: OBSTETRICS & GYNECOLOGY

## 2020-10-02 PROCEDURE — 86592 SYPHILIS TEST NON-TREP QUAL: CPT | Performed by: OBSTETRICS & GYNECOLOGY

## 2020-10-02 PROCEDURE — 86901 BLOOD TYPING SEROLOGIC RH(D): CPT | Performed by: OBSTETRICS & GYNECOLOGY

## 2020-10-02 PROCEDURE — 3E033VJ INTRODUCTION OF OTHER HORMONE INTO PERIPHERAL VEIN, PERCUTANEOUS APPROACH: ICD-10-PCS | Performed by: OBSTETRICS & GYNECOLOGY

## 2020-10-02 PROCEDURE — 86870 RBC ANTIBODY IDENTIFICATION: CPT | Performed by: ANESTHESIOLOGY

## 2020-10-02 RX ORDER — ROPIVACAINE HYDROCHLORIDE 2 MG/ML
INJECTION, SOLUTION EPIDURAL; INFILTRATION; PERINEURAL
Status: COMPLETED
Start: 2020-10-02 | End: 2020-10-03

## 2020-10-02 RX ORDER — ALBUTEROL SULFATE 90 UG/1
2 AEROSOL, METERED RESPIRATORY (INHALATION) EVERY 6 HOURS PRN
Status: DISCONTINUED | OUTPATIENT
Start: 2020-10-02 | End: 2020-10-05 | Stop reason: HOSPADM

## 2020-10-02 RX ORDER — URSODIOL 300 MG/1
CAPSULE ORAL
Qty: 60 CAPSULE | Refills: 1 | Status: SHIPPED | OUTPATIENT
Start: 2020-10-02 | End: 2022-01-22 | Stop reason: ALTCHOICE

## 2020-10-02 RX ORDER — BUTORPHANOL TARTRATE 1 MG/ML
1 INJECTION, SOLUTION INTRAMUSCULAR; INTRAVENOUS ONCE
Status: COMPLETED | OUTPATIENT
Start: 2020-10-02 | End: 2020-10-02

## 2020-10-02 RX ORDER — SODIUM CHLORIDE, SODIUM LACTATE, POTASSIUM CHLORIDE, CALCIUM CHLORIDE 600; 310; 30; 20 MG/100ML; MG/100ML; MG/100ML; MG/100ML
125 INJECTION, SOLUTION INTRAVENOUS CONTINUOUS
Status: DISCONTINUED | OUTPATIENT
Start: 2020-10-02 | End: 2020-10-03

## 2020-10-02 RX ORDER — DIPHENHYDRAMINE HYDROCHLORIDE 50 MG/ML
25 INJECTION INTRAMUSCULAR; INTRAVENOUS EVERY 6 HOURS PRN
Status: DISCONTINUED | OUTPATIENT
Start: 2020-10-02 | End: 2020-10-05

## 2020-10-02 RX ORDER — OXYTOCIN/RINGER'S LACTATE 30/500 ML
1-30 PLASTIC BAG, INJECTION (ML) INTRAVENOUS
Status: DISCONTINUED | OUTPATIENT
Start: 2020-10-02 | End: 2020-10-03

## 2020-10-02 RX ADMIN — Medication 2 MILLI-UNITS/MIN: at 16:15

## 2020-10-02 RX ADMIN — VANCOMYCIN HYDROCHLORIDE 2000 MG: 1 INJECTION, POWDER, LYOPHILIZED, FOR SOLUTION INTRAVENOUS at 16:08

## 2020-10-02 RX ADMIN — VANCOMYCIN HYDROCHLORIDE 2000 MG: 1 INJECTION, POWDER, LYOPHILIZED, FOR SOLUTION INTRAVENOUS at 07:45

## 2020-10-02 RX ADMIN — SODIUM CHLORIDE, SODIUM LACTATE, POTASSIUM CHLORIDE, AND CALCIUM CHLORIDE 125 ML/HR: .6; .31; .03; .02 INJECTION, SOLUTION INTRAVENOUS at 18:32

## 2020-10-02 RX ADMIN — BUTORPHANOL TARTRATE 1 MG: 1 INJECTION, SOLUTION INTRAMUSCULAR; INTRAVENOUS at 14:04

## 2020-10-02 RX ADMIN — SODIUM CHLORIDE, SODIUM LACTATE, POTASSIUM CHLORIDE, AND CALCIUM CHLORIDE 125 ML/HR: .6; .31; .03; .02 INJECTION, SOLUTION INTRAVENOUS at 07:45

## 2020-10-02 RX ADMIN — MISOPROSTOL 25 MCG: 100 TABLET ORAL at 09:42

## 2020-10-02 RX ADMIN — DIPHENHYDRAMINE HYDROCHLORIDE 25 MG: 50 INJECTION, SOLUTION INTRAMUSCULAR; INTRAVENOUS at 16:07

## 2020-10-02 RX ADMIN — SODIUM CHLORIDE, SODIUM LACTATE, POTASSIUM CHLORIDE, AND CALCIUM CHLORIDE 125 ML/HR: .6; .31; .03; .02 INJECTION, SOLUTION INTRAVENOUS at 23:10

## 2020-10-02 RX ADMIN — DIPHENHYDRAMINE HYDROCHLORIDE 25 MG: 50 INJECTION, SOLUTION INTRAMUSCULAR; INTRAVENOUS at 08:39

## 2020-10-02 NOTE — H&P
[facility] [address] 44 y o  female MRN: [id number]  Unit/Bed#: L&D 322-01 Encounter: [id number]      Assessment: 44 y o   at 36w0d admitted for IOL for cholestasis of pregnancy  SVE: /-3  FHT: 145, mod robert, pos accels, no decels, ctx q8min  Clinical EFW: 93%ile ; Vertex confirmed by U/S  GBS status: Positive   Postpartum plan: breast / still unsure about contraception    Plan:   · Admit  · CBC, RPR, Type & Screen  · Analgesia at maternal request  · Plan to start induction with cytotec, noyola balloon, then move on to pitocin  · Antibiotics indicated: pt has allx to amoxicillin, plan to give vancomycin    Dr Dori Carranza and Dr Armand Palmer aware  SUBJECTIVE:    Chief Complaint: eIOL, no complaints    HPI: Raffi Wiseman is a 44 y o   with an MITESH of 10/30/2020,  who is being admitted for IOL for cholestasis of pregnancy  She endorse very mild uterine contractions, has no LOF, and reports no VB  She states she has felt good FM  Brenda Wheatley     Pregnancy complications: Cholestasis of pregnancy, AMA, obesity, Rh Negative, h/o HSV outbreak in pregnancy (instructed to take Valtrex in pregnancy, not currently taking), abnormal 1 hr GTT w/ normal 3hr GTT, macrosomia, GBS positivity, and fibroid uterus    Patient Active Problem List   Diagnosis    Dietary calcium deficiency    Generalized anxiety disorder    Pap smear abnormality of cervix/human papillomavirus (HPV) positive    Fibroids, intramural    Inadequate exercise    Vitamin D deficiency    ASCUS with positive high risk HPV cervical    Seborrhea    AMA (advanced maternal age) multigravida 35+, third trimester    Obesity affecting pregnancy, antepartum, third trimester    Rh negative status during pregnancy in third trimester    Uterine fibroid during pregnancy, antepartum    History of herpes genitalis    Herpes simplex type 2 (HSV-2) infection affecting pregnancy, antepartum, third trimester    Gestational diabetes    Excessive fetal growth affecting management of pregnancy in third trimester    Cholestasis during pregnancy in third trimester    36 weeks gestation of pregnancy    Group B Streptococcus carrier, antepartum       Baby complications/comments: macrosomia, EFW 93%ile    Review of Systems   Constitutional: Negative for chills and fever  Respiratory: Negative for cough and shortness of breath  Cardiovascular: Positive for leg swelling (1+ B/L LE edema, at baseline of recent weeks of pregnancy)  Negative for chest pain and palpitations  Gastrointestinal: Negative for abdominal pain, constipation, diarrhea, nausea and vomiting  Genitourinary: Negative for dysuria, hematuria and vaginal bleeding  Musculoskeletal: Negative for arthralgias and myalgias  Skin: Negative for rash  Neurological: Negative for dizziness and headaches         OB History    Para Term  AB Living   3 1 1   1 1   SAB TAB Ectopic Multiple Live Births     1     1      # Outcome Date GA Lbr Edis/2nd Weight Sex Delivery Anes PTL Lv   3 Current            2 Term 11 37w0d  2807 g (6 lb 3 oz) M Vag-Spont EPI N SLY      Birth Comments: different FOB   1 TAB 2008     TAB         Obstetric Comments   Menarche: 12    x 1          Past Medical History:   Diagnosis Date    Abnormal Pap smear of cervix     2017-wnl, HPV + (18/45), colpo; 2018-ASCUS + HPV: -colpo squamous atypia; 2019: wnl, HPV neg    Attention disturbance     last assessed: 2017    BV (bacterial vaginosis)     Cholestasis during pregnancy in third trimester 2020    Fibroid     4 cm lower uterine segment    Gestational diabetes     Gestational diabetes     Headache     Herpes     HPV (human papilloma virus) infection     Need for prophylactic vaccination against human papillomavirus (HPV) types 6, 11, 16, and 18     declines    Varicella        Past Surgical History:   Procedure Laterality Date    COLONOSCOPY  2019    wnl    DILATION AND CURETTAGE, DIAGNOSTIC / THERAPEUTIC      ETOP       Social History     Tobacco Use    Smoking status: Never Smoker    Smokeless tobacco: Never Used   Substance Use Topics    Alcohol use: Yes     Alcohol/week: 1 0 standard drinks     Types: 1 Glasses of wine per week     Frequency: 2-4 times a month     Drinks per session: 1 or 2     Comment: none since aware of pregnancy       Allergies   Allergen Reactions    Amoxicillin Rash     Category: Allergy; Medications Prior to Admission   Medication    PRENATAL VIT-DOCUSATE-IRON-FA PO    ursodiol (ACTIGALL) 300 mg capsule    valACYclovir (VALTREX) 1,000 mg tablet    Accu-Chek FastClix Lancets MISC    Accu-Chek Guide test strip    albuterol (PROVENTIL HFA) 90 mcg/act inhaler    Blood Glucose Monitoring Suppl (Accu-Chek Guide Me) w/Device KIT       OBJECTIVE:  Vitals:  Temp:  [98 3 °F (36 8 °C)] 98 3 °F (36 8 °C)  HR:  [93] 93  Resp:  [14] 14  BP: (110)/(74) 110/74  Body mass index is 36 67 kg/m²  Physical Exam:  Physical Exam  Constitutional:       Appearance: Normal appearance  Cardiovascular:      Rate and Rhythm: Normal rate  Heart sounds: Normal heart sounds  No murmur  No friction rub  No gallop  Pulmonary:      Effort: Pulmonary effort is normal  No respiratory distress  Breath sounds: Normal breath sounds  No stridor  No wheezing or rales  Abdominal:      Palpations: Abdomen is soft  Tenderness: There is no abdominal tenderness  There is no guarding or rebound  Comments: gravid   Musculoskeletal:      Right lower leg: Edema (1+) present  Left lower leg: Edema (1+) present  Neurological:      Mental Status: She is alert  Skin:     General: Skin is warm and dry  Capillary Refill: Capillary refill takes less than 2 seconds  Coloration: Skin is not pale  Findings: No rash     Psychiatric:         Mood and Affect: Mood normal          Judgment: Judgment normal         SVE:  1/60/-3    FHT:  Baseline Rate: 145 bpm  Variability: Moderate 6-25 bpm  Accelerations: 15 x 15 or greater  Decelerations: None    TOCO:   Contraction Frequency (minutes): q8 min  Contraction Duration (seconds): 60  Contraction Quality: Mild    Lab Results   Component Value Date    WBC 10 57 (H) 10/02/2020    HGB 11 0 (L) 10/02/2020    HCT 33 5 (L) 10/02/2020     10/02/2020     Lab Results   Component Value Date    K 3 7 08/07/2020     08/07/2020    CO2 25 08/07/2020    BUN 5 (L) 08/07/2020    CREATININE 0 53 08/07/2020    AST 12 08/07/2020    ALT 8 08/07/2020       Prenatal Labs: Reviewed      Blood type: O-  Antibody: negative  Group B strep: positive  HIV: negative  Hepatitis B: negative  RPR: non-reactive  Rubella: Immune  Varicella: Unknown  1 hour Glucose: 167  3 hour glucose: 79, 167, 181, 103  Platelets: 449J  Hgb: 11 0    >2 Midnights  INPATIENT       Noemi Sykes MD  OB/GYN PGY-1  10/2/2020  9:11 AM

## 2020-10-02 NOTE — OB LABOR/OXYTOCIN SAFETY PROGRESS
Labor Progress Note - Diego Henley 44 y o  female MRN: [id number]    Unit/Bed#: L&D 322-01 Encounter: [id number]       Contraction Frequency (minutes): q8 min  Contraction Quality: Mild  Tachysystole: No     Cervical Dilation: 1  Cervical Effacement: 60  Fetal Station: -3     Baseline Rate: 145 bpm  Fetal Heart Rate: 145 BPM      Patient comfortable, eating breakfast  Cervix currently very posterior; plan to place vaginal cytotec 25mcg, re-check in 1-2 hours and re-consider noyola placement at that time  Vital Signs:  Vitals:    10/02/20 0723   BP: 110/74   Pulse: 93   Resp: 14   Temp: 98 3 °F (36 8 °C)     Pt discussed w/ Dr Luanne Bee MD 10/2/2020 9:10 AM

## 2020-10-02 NOTE — OB LABOR/OXYTOCIN SAFETY PROGRESS
Labor Progress Note - Heron Edmond 44 y o  female MRN: [id number]    Unit/Bed#: L&D 322-01 Encounter: [id number]       Contraction Frequency (minutes): 2-3  Contraction Quality: Mild, Moderate  Tachysystole: No   Cervical Dilation: 1        Cervical Effacement: 60  Fetal Station: -3  Baseline Rate: 140 bpm  Fetal Heart Rate: 150 BPM  FHR Category: Category I               Vital Signs:   Vitals:    10/02/20 1214   BP: 125/69   Pulse: 83   Resp:    Temp:            Notes/comments:   Pt reports increasing discomfort with contractions  Desires stadol  Delvalle balloon still in place  Stadol for pain  Continue current management       Vianney Jarvis MD 10/2/2020 1:51 PM

## 2020-10-02 NOTE — OB LABOR/OXYTOCIN SAFETY PROGRESS
Labor Progress Note - Elvira Bautista 44 y o  female MRN: [id number]    Unit/Bed#: L&D 322-01 Encounter: [id number]       Contraction Frequency (minutes): irregular uterine activity during hansen balloon placement  Contraction Quality: Mild  Tachysystole: No     Cervical Dilation: 1  Cervical Effacement: 60  Fetal Station: -3     Baseline Rate: 150 bpm  Fetal Heart Rate: 150 BPM      Pt comfortable with rare ctx  PROCEDURE:  HANSEN BALLOON PLACEMENT    A 24F hansen with a 30cc balloon was selected, SVE was performed and cervix was located, hansen was introduced over sterile gloved hands  Balloon not able to be advanced through cervix beyond the internal cervical os  A speculum was then placed, and the hansen was able to pass through the external os and internal os  A small amount amount of sterile saline solution was instilled in the balloon to confirm placement  Placement was confirmed to be beyond the internal cervical os  A total of 60cc of sterile saline solution was placed into the balloon  Hansen was clamped  Pt tolerated well  Instructions left with RN to place hansen to gravity with a 1L bag of IV fluid  Notify MD when hansen dislodged  Cytotec 25mcg placed vaginally      Vital Signs:  Vitals:    10/02/20 0723   BP: 110/74   Pulse: 93   Resp: 14   Temp: 98 3 °F (36 8 °C)       Plan to re-check if patient becomes uncomfortable, when hansen falls out, or in 3 hours, whichever happens first     D/w Dr Sasha Durán MD 10/2/2020 9:59 AM

## 2020-10-02 NOTE — PROGRESS NOTES
Pt called and said that whenever I started the antibiotics her head got very itchy  Dr Shila Torres notified  IV infusion stopped and benadryl given

## 2020-10-02 NOTE — OB LABOR/OXYTOCIN SAFETY PROGRESS
Labor Progress Note - Birthstephania Aden 44 y o  female MRN: [id number]    Unit/Bed#: L&D 322-01 Encounter: [id number]       Contraction Frequency (minutes): 2-5  Contraction Quality: Mild, Moderate  Tachysystole: No   Cervical Dilation: 3        Cervical Effacement: 50  Fetal Station: -3  Baseline Rate: 130 bpm  Fetal Heart Rate: 150 BPM  FHR Category: Category I               Vital Signs:   Vitals:    10/02/20 1214   BP: 125/69   Pulse: 83   Resp:    Temp:            Notes/comments:   Delvalle balloon extruded   SVE: 3/50/-3  Will start pitocin  Pt feels well after stadol    Sydni Cullen MD 10/2/2020 3:58 PM

## 2020-10-02 NOTE — OB LABOR/OXYTOCIN SAFETY PROGRESS
Oxytocin Safety Progress Check Note - Thong Bloodgood 44 y o  female MRN: [id number]    Unit/Bed#: L&D 322-01 Encounter: [id number]    Dose (joy-units/min) Oxytocin: 6 joy-units/min  Contraction Frequency (minutes): 1-5  Contraction Quality: Mild  Tachysystole: No   Cervical Dilation: 4        Cervical Effacement: 50  Fetal Station: -3  Baseline Rate: 145 bpm  Fetal Heart Rate: 150 BPM  FHR Category: Category I               Vital Signs:   Vitals:    10/02/20 1822   BP: 123/59   Pulse: 85   Resp:    Temp:            Notes/comments:    Evaluated patient after 2 hours of pitocin  Found to be changed to 4cm dilated  Will plan to continue pitocin and continue to monitor      Discussed with Dr Kahlil Gann MD 10/2/2020 6:32 PM

## 2020-10-03 LAB
ABO GROUP BLD: NORMAL
BASE EXCESS BLDCOA CALC-SCNC: -4.3 MMOL/L (ref 3–11)
BASE EXCESS BLDCOV CALC-SCNC: -2.8 MMOL/L (ref 1–9)
BLD GP AB SCN SERPL QL: POSITIVE
FETAL CELL SCN BLD QL ROSETTE: NEGATIVE
GLUCOSE SERPL-MCNC: 71 MG/DL (ref 65–140)
HCO3 BLDCOA-SCNC: 23 MMOL/L (ref 17.3–27.3)
HCO3 BLDCOV-SCNC: 22.5 MMOL/L (ref 12.2–28.6)
O2 CT VFR BLDCOA CALC: 10 ML/DL
OXYHGB MFR BLDCOA: 44.3 %
OXYHGB MFR BLDCOV: 54.7 %
PCO2 BLDCOA: 50.2 MM[HG] (ref 30–60)
PCO2 BLDCOV: 41 MM HG (ref 27–43)
PH BLDCOA: 7.28 [PH] (ref 7.23–7.43)
PH BLDCOV: 7.36 [PH] (ref 7.19–7.49)
PO2 BLDCOA: 22.9 MM HG (ref 5–25)
PO2 BLDCOV: 22.5 MM HG (ref 15–45)
RH BLD: NEGATIVE
SAO2 % BLDCOV: 11.9 ML/DL

## 2020-10-03 PROCEDURE — 59400 OBSTETRICAL CARE: CPT | Performed by: OBSTETRICS & GYNECOLOGY

## 2020-10-03 PROCEDURE — 86900 BLOOD TYPING SEROLOGIC ABO: CPT | Performed by: OBSTETRICS & GYNECOLOGY

## 2020-10-03 PROCEDURE — 0HQ9XZZ REPAIR PERINEUM SKIN, EXTERNAL APPROACH: ICD-10-PCS | Performed by: OBSTETRICS & GYNECOLOGY

## 2020-10-03 PROCEDURE — 86850 RBC ANTIBODY SCREEN: CPT | Performed by: OBSTETRICS & GYNECOLOGY

## 2020-10-03 PROCEDURE — 85461 HEMOGLOBIN FETAL: CPT | Performed by: OBSTETRICS & GYNECOLOGY

## 2020-10-03 PROCEDURE — 10907ZC DRAINAGE OF AMNIOTIC FLUID, THERAPEUTIC FROM PRODUCTS OF CONCEPTION, VIA NATURAL OR ARTIFICIAL OPENING: ICD-10-PCS | Performed by: OBSTETRICS & GYNECOLOGY

## 2020-10-03 PROCEDURE — 82805 BLOOD GASES W/O2 SATURATION: CPT | Performed by: OBSTETRICS & GYNECOLOGY

## 2020-10-03 PROCEDURE — 4A0H7CZ MEASUREMENT OF PRODUCTS OF CONCEPTION, CARDIAC RATE, VIA NATURAL OR ARTIFICIAL OPENING: ICD-10-PCS | Performed by: OBSTETRICS & GYNECOLOGY

## 2020-10-03 PROCEDURE — 99024 POSTOP FOLLOW-UP VISIT: CPT | Performed by: OBSTETRICS & GYNECOLOGY

## 2020-10-03 PROCEDURE — 82948 REAGENT STRIP/BLOOD GLUCOSE: CPT

## 2020-10-03 PROCEDURE — 86901 BLOOD TYPING SEROLOGIC RH(D): CPT | Performed by: OBSTETRICS & GYNECOLOGY

## 2020-10-03 RX ORDER — OXYCODONE HYDROCHLORIDE 5 MG/1
5 TABLET ORAL EVERY 4 HOURS PRN
Status: DISCONTINUED | OUTPATIENT
Start: 2020-10-03 | End: 2020-10-05 | Stop reason: HOSPADM

## 2020-10-03 RX ORDER — DOCUSATE SODIUM 100 MG/1
100 CAPSULE, LIQUID FILLED ORAL 2 TIMES DAILY
Status: DISCONTINUED | OUTPATIENT
Start: 2020-10-03 | End: 2020-10-05 | Stop reason: HOSPADM

## 2020-10-03 RX ORDER — BISACODYL 10 MG
10 SUPPOSITORY, RECTAL RECTAL DAILY PRN
Status: DISCONTINUED | OUTPATIENT
Start: 2020-10-03 | End: 2020-10-05 | Stop reason: HOSPADM

## 2020-10-03 RX ORDER — CALCIUM CARBONATE 200(500)MG
1000 TABLET,CHEWABLE ORAL 3 TIMES DAILY PRN
Status: DISCONTINUED | OUTPATIENT
Start: 2020-10-03 | End: 2020-10-05 | Stop reason: HOSPADM

## 2020-10-03 RX ORDER — ROPIVACAINE HYDROCHLORIDE 2 MG/ML
INJECTION, SOLUTION EPIDURAL; INFILTRATION; PERINEURAL AS NEEDED
Status: DISCONTINUED | OUTPATIENT
Start: 2020-10-03 | End: 2020-10-03 | Stop reason: HOSPADM

## 2020-10-03 RX ORDER — OXYTOCIN/RINGER'S LACTATE 30/500 ML
62.5 PLASTIC BAG, INJECTION (ML) INTRAVENOUS ONCE
Status: COMPLETED | OUTPATIENT
Start: 2020-10-03 | End: 2020-10-03

## 2020-10-03 RX ORDER — LIDOCAINE HYDROCHLORIDE AND EPINEPHRINE 15; 5 MG/ML; UG/ML
INJECTION, SOLUTION EPIDURAL
Status: COMPLETED | OUTPATIENT
Start: 2020-10-03 | End: 2020-10-03

## 2020-10-03 RX ORDER — ROPIVACAINE HYDROCHLORIDE 2 MG/ML
INJECTION, SOLUTION EPIDURAL; INFILTRATION; PERINEURAL CONTINUOUS PRN
Status: DISCONTINUED | OUTPATIENT
Start: 2020-10-03 | End: 2020-10-03 | Stop reason: HOSPADM

## 2020-10-03 RX ORDER — DIAPER,BRIEF,INFANT-TODD,DISP
1 EACH MISCELLANEOUS 4 TIMES DAILY PRN
Status: DISCONTINUED | OUTPATIENT
Start: 2020-10-03 | End: 2020-10-05 | Stop reason: HOSPADM

## 2020-10-03 RX ORDER — ONDANSETRON 2 MG/ML
4 INJECTION INTRAMUSCULAR; INTRAVENOUS EVERY 6 HOURS PRN
Status: DISCONTINUED | OUTPATIENT
Start: 2020-10-03 | End: 2020-10-05 | Stop reason: HOSPADM

## 2020-10-03 RX ORDER — DIPHENHYDRAMINE HCL 25 MG
25 TABLET ORAL EVERY 6 HOURS PRN
Status: DISCONTINUED | OUTPATIENT
Start: 2020-10-03 | End: 2020-10-05 | Stop reason: HOSPADM

## 2020-10-03 RX ORDER — SIMETHICONE 80 MG
80 TABLET,CHEWABLE ORAL EVERY 6 HOURS PRN
Status: DISCONTINUED | OUTPATIENT
Start: 2020-10-03 | End: 2020-10-05 | Stop reason: HOSPADM

## 2020-10-03 RX ORDER — ACETAMINOPHEN 325 MG/1
650 TABLET ORAL EVERY 6 HOURS PRN
Status: DISCONTINUED | OUTPATIENT
Start: 2020-10-03 | End: 2020-10-05 | Stop reason: HOSPADM

## 2020-10-03 RX ORDER — IBUPROFEN 600 MG/1
600 TABLET ORAL EVERY 6 HOURS PRN
Status: DISCONTINUED | OUTPATIENT
Start: 2020-10-03 | End: 2020-10-05 | Stop reason: HOSPADM

## 2020-10-03 RX ADMIN — VANCOMYCIN HYDROCHLORIDE 2000 MG: 1 INJECTION, POWDER, LYOPHILIZED, FOR SOLUTION INTRAVENOUS at 00:01

## 2020-10-03 RX ADMIN — HYDROCORTISONE 1 APPLICATION: 1 CREAM TOPICAL at 05:32

## 2020-10-03 RX ADMIN — DOCUSATE SODIUM 100 MG: 100 CAPSULE, LIQUID FILLED ORAL at 17:59

## 2020-10-03 RX ADMIN — WITCH HAZEL 1 PAD: 500 SOLUTION RECTAL; TOPICAL at 05:32

## 2020-10-03 RX ADMIN — DOCUSATE SODIUM 100 MG: 100 CAPSULE, LIQUID FILLED ORAL at 08:48

## 2020-10-03 RX ADMIN — IBUPROFEN 600 MG: 600 TABLET, FILM COATED ORAL at 17:59

## 2020-10-03 RX ADMIN — LIDOCAINE HYDROCHLORIDE AND EPINEPHRINE 3 ML: 15; 5 INJECTION, SOLUTION EPIDURAL at 00:16

## 2020-10-03 RX ADMIN — ACETAMINOPHEN 650 MG: 325 TABLET ORAL at 05:32

## 2020-10-03 RX ADMIN — ROPIVACAINE HYDROCHLORIDE: 2 INJECTION, SOLUTION EPIDURAL; INFILTRATION at 00:24

## 2020-10-03 RX ADMIN — IBUPROFEN 600 MG: 600 TABLET, FILM COATED ORAL at 05:31

## 2020-10-03 RX ADMIN — SODIUM CHLORIDE, SODIUM LACTATE, POTASSIUM CHLORIDE, AND CALCIUM CHLORIDE 125 ML/HR: .6; .31; .03; .02 INJECTION, SOLUTION INTRAVENOUS at 00:50

## 2020-10-03 RX ADMIN — ACETAMINOPHEN 650 MG: 325 TABLET ORAL at 17:59

## 2020-10-03 RX ADMIN — BENZOCAINE AND LEVOMENTHOL: 200; 5 SPRAY TOPICAL at 05:32

## 2020-10-03 RX ADMIN — OXYCODONE HYDROCHLORIDE 5 MG: 5 TABLET ORAL at 15:17

## 2020-10-03 RX ADMIN — ROPIVACAINE HYDROCHLORIDE 5 ML: 2 INJECTION, SOLUTION EPIDURAL; INFILTRATION at 00:18

## 2020-10-03 RX ADMIN — OXYCODONE HYDROCHLORIDE 5 MG: 5 TABLET ORAL at 08:48

## 2020-10-03 RX ADMIN — Medication 62.5 MILLI-UNITS/MIN: at 04:47

## 2020-10-03 RX ADMIN — HUMAN RHO(D) IMMUNE GLOBULIN 300 MCG: 300 INJECTION, SOLUTION INTRAMUSCULAR at 15:21

## 2020-10-03 RX ADMIN — OXYCODONE HYDROCHLORIDE 5 MG: 5 TABLET ORAL at 20:11

## 2020-10-03 RX ADMIN — ROPIVACAINE HYDROCHLORIDE 10 ML/HR: 2 INJECTION, SOLUTION EPIDURAL; INFILTRATION at 00:24

## 2020-10-03 RX ADMIN — ROPIVACAINE HYDROCHLORIDE 5 ML: 2 INJECTION, SOLUTION EPIDURAL; INFILTRATION at 00:21

## 2020-10-03 RX ADMIN — IBUPROFEN 600 MG: 600 TABLET, FILM COATED ORAL at 12:17

## 2020-10-03 NOTE — OB LABOR/OXYTOCIN SAFETY PROGRESS
Oxytocin Safety Progress Check Note - Tray Meneses 44 y o  female MRN: [id number]    Unit/Bed#: L&D 322-01 Encounter: [id number]    Dose (joy-units/min) Oxytocin: 10 joy-units/min  Contraction Frequency (minutes): 2-3 5  Contraction Quality: Moderate  Tachysystole: No   Cervical Dilation: 8        Cervical Effacement: 100  Fetal Station: 0  Baseline Rate: 140 bpm  Fetal Heart Rate: 140 BPM  FHR Category: Category II               Vital Signs:   Vitals:    10/03/20 0334   BP: 99/59   Pulse: 99   Resp:    Temp:    SpO2:            Notes/comments:    Evaluated patient after reported increase in pressure and discomfort  Found to be changed to 8cm dilated  Category II tracing with occasional late decelerations  Will continue to monitor      Discussed with Dr Karina Rodrigez MD 10/3/2020 4:08 AM

## 2020-10-03 NOTE — PLAN OF CARE
Problem: Knowledge Deficit  Goal: Verbalizes understanding of labor plan  Description: Assess patient/family/caregiver's baseline knowledge level and ability to understand information  Provide education via patient/family/caregiver's preferred learning method at appropriate level of understanding  1  Provide teaching at level of understanding  2  Provide teaching via preferred learning method(s)  Outcome: Progressing  Goal: Patient/family/caregiver demonstrates understanding of disease process, treatment plan, medications, and discharge instructions  Description: Complete learning assessment and assess knowledge base    Interventions:  - Provide teaching at level of understanding  - Provide teaching via preferred learning methods  Outcome: Progressing     Problem: PAIN - ADULT  Goal: Verbalizes/displays adequate comfort level or baseline comfort level  Description: Interventions:  - Encourage patient to monitor pain and request assistance  - Assess pain using appropriate pain scale  - Administer analgesics based on type and severity of pain and evaluate response  - Implement non-pharmacological measures as appropriate and evaluate response  - Consider cultural and social influences on pain and pain management  - Notify physician/advanced practitioner if interventions unsuccessful or patient reports new pain  Outcome: Progressing     Problem: INFECTION - ADULT  Goal: Absence or prevention of progression during hospitalization  Description: INTERVENTIONS:  - Assess and monitor for signs and symptoms of infection  - Monitor lab/diagnostic results  - Monitor all insertion sites, i e  indwelling lines, tubes, and drains  - Monitor nasal secretions for changes in amount and color  - [place] appropriate cooling/warming therapies per order  - Administer medications as ordered  - Instruct and encourage patient and family to use good hand hygiene technique  - Identify and instruct in appropriate isolation precautions for identified infection/condition  Outcome: Progressing     Problem: SAFETY ADULT  Goal: Patient will remain free of falls  Description: INTERVENTIONS:  - Assess patient frequently for physical needs  -  Identify cognitive and physical deficits and behaviors that affect risk of falls    -  [place] fall precautions as indicated by assessment   - Educate patient/family on patient safety including physical limitations  - Instruct patient to call for assistance with activity based on assessment  - Modify environment to reduce risk of injury  - Consider OT/PT consult to assist with strengthening/mobility  Outcome: Progressing  Goal: Maintain or return to baseline ADL function  Description: INTERVENTIONS:  -  Assess patient's ability to carry out ADLs; assess patient's baseline for ADL function and identify physical deficits which impact ability to perform ADLs (bathing, care of mouth/teeth, toileting, grooming, dressing, etc )  - Assess/evaluate cause of self-care deficits   - Assess range of motion  - Assess patient's mobility; develop plan if impaired  - Assess patient's need for assistive devices and provide as appropriate  - Encourage maximum independence but intervene and supervise when necessary  - Involve family in performance of ADLs  - Assess for home care needs following discharge   - Consider OT consult to assist with ADL evaluation and planning for discharge  - Provide patient education as appropriate  Outcome: Progressing  Goal: Maintain or return mobility status to optimal level  Description: INTERVENTIONS:  - Assess patient's baseline mobility status (ambulation, transfers, stairs, etc )    - Identify cognitive and physical deficits and behaviors that affect mobility  - Identify mobility aids required to assist with transfers and/or ambulation (gait belt, sit-to-stand, lift, walker, cane, etc )  - [place] fall precautions as indicated by assessment  - Record patient progress and toleration of activity level on Mobility SBAR; progress patient to next Phase/Stage  - Instruct patient to call for assistance with activity based on assessment  - Consider rehabilitation consult to assist with strengthening/weightbearing, etc   Outcome: Progressing     Problem: DISCHARGE PLANNING  Goal: Discharge to home or other facility with appropriate resources  Description: INTERVENTIONS:  - Identify barriers to discharge w/patient and caregiver  - Arrange for needed discharge resources and transportation as appropriate  - Identify discharge learning needs (meds, wound care, etc )  - Arrange for interpretive services to assist at discharge as needed  - Refer to Case Management Department for coordinating discharge planning if the patient needs post-hospital services based on physician/advanced practitioner order or complex needs related to functional status, cognitive ability, or social support system  Outcome: Progressing     Problem: Labor & Delivery  Goal: Manages discomfort  Description: Assess and monitor for signs and symptoms of discomfort  Assess patient's pain level regularly and per hospital policy  Administer medications as ordered  Support use of nonpharmacological methods to help control pain such as distraction, imagery, relaxation, and application of heat and cold  Collaborate with interdisciplinary team and patient to determine appropriate pain management plan  1  Include patient in decisions related to comfort  2  Offer non-pharmacological pain management interventions  3  Report ineffective pain management to physician  Outcome: Completed  Goal: Patient vital signs are stable  Description: 1  Assess vital signs - vaginal delivery    Outcome: Completed

## 2020-10-03 NOTE — OB LABOR/OXYTOCIN SAFETY PROGRESS
Oxytocin Safety Progress Check Note - Saeid Martinez 44 y o  female MRN: [id number]    Unit/Bed#: L&D 322-01 Encounter: [id number]    Dose (joy-units/min) Oxytocin: 10 joy-units/min  Contraction Frequency (minutes): 2-5  Contraction Quality: Mild  Tachysystole: No   Cervical Dilation: 4-5        Cervical Effacement: 50  Fetal Station: -3  Baseline Rate: 145 bpm  Fetal Heart Rate: 150 BPM  FHR Category: Category I               Vital Signs:   Vitals:    10/02/20 2008   BP: 133/60   Pulse: 72   Resp:    Temp:            Notes/comments:    Evaluated patient after 2 hours  Still relatively comfortable, feeling more contractions  Found to be minimally changed to 4 5cm dilated  Will continue pitocin titration and continue to monitor      Discussed with Dr Robert Poole MD 10/2/2020 8:56 PM

## 2020-10-03 NOTE — OB LABOR/OXYTOCIN SAFETY PROGRESS
Oxytocin Safety Progress Check Note - Deepika Cueva 44 y o  female MRN: [id number]    Unit/Bed#: L&D 322-01 Encounter: [id number]    Dose (joy-units/min) Oxytocin: 10 joy-units/min  Contraction Frequency (minutes): 2-2 5  Contraction Quality: Moderate  Tachysystole: No   Cervical Dilation: 5        Cervical Effacement: 60  Fetal Station: -2  Baseline Rate: 160 bpm  Fetal Heart Rate: 140 BPM  FHR Category: Category II               Vital Signs:   Vitals:    10/03/20 0231   BP: 112/60   Pulse: 75   Resp:    Temp:    SpO2:            Notes/comments:   Evaluated patient after reported increase in pressure  Category II tracing with occasional periods of late or variable decelerations, not recurrent, improved with positioning, moderate variability  Found to be minimally changed to 5cm dilated  Will continue pitocin titration and continue to monitor      Discussed with Dr Maci Price MD 10/3/2020 3:08 AM

## 2020-10-03 NOTE — PLAN OF CARE
Problem: PAIN - ADULT  Goal: Verbalizes/displays adequate comfort level or baseline comfort level  Description: Interventions:  - Encourage patient to monitor pain and request assistance  - Assess pain using appropriate pain scale  - Administer analgesics based on type and severity of pain and evaluate response  - Implement non-pharmacological measures as appropriate and evaluate response  - Consider cultural and social influences on pain and pain management  - Notify physician/advanced practitioner if interventions unsuccessful or patient reports new pain  Outcome: Progressing     Problem: INFECTION - ADULT  Goal: Absence or prevention of progression during hospitalization  Description: INTERVENTIONS:  - Assess and monitor for signs and symptoms of infection  - Monitor lab/diagnostic results  - Monitor all insertion sites, i e  indwelling lines, tubes, and drains  - Monitor nasal secretions for changes in amount and color  - [place] appropriate cooling/warming therapies per order  - Administer medications as ordered  - Instruct and encourage patient and family to use good hand hygiene technique  - Identify and instruct in appropriate isolation precautions for identified infection/condition  Outcome: Progressing     Problem: SAFETY ADULT  Goal: Patient will remain free of falls  Description: INTERVENTIONS:  - Assess patient frequently for physical needs  -  Identify cognitive and physical deficits and behaviors that affect risk of falls    -  [place] fall precautions as indicated by assessment   - Educate patient/family on patient safety including physical limitations  - Instruct patient to call for assistance with activity based on assessment  - Modify environment to reduce risk of injury  - Consider OT/PT consult to assist with strengthening/mobility  Outcome: Progressing  Goal: Maintain or return to baseline ADL function  Description: INTERVENTIONS:  -  Assess patient's ability to carry out ADLs; assess patient's baseline for ADL function and identify physical deficits which impact ability to perform ADLs (bathing, care of mouth/teeth, toileting, grooming, dressing, etc )  - Assess/evaluate cause of self-care deficits   - Assess range of motion  - Assess patient's mobility; develop plan if impaired  - Assess patient's need for assistive devices and provide as appropriate  - Encourage maximum independence but intervene and supervise when necessary  - Involve family in performance of ADLs  - Assess for home care needs following discharge   - Consider OT consult to assist with ADL evaluation and planning for discharge  - Provide patient education as appropriate  Outcome: Progressing  Goal: Maintain or return mobility status to optimal level  Description: INTERVENTIONS:  - Assess patient's baseline mobility status (ambulation, transfers, stairs, etc )    - Identify cognitive and physical deficits and behaviors that affect mobility  - Identify mobility aids required to assist with transfers and/or ambulation (gait belt, sit-to-stand, lift, walker, cane, etc )  - [place] fall precautions as indicated by assessment  - Record patient progress and toleration of activity level on Mobility SBAR; progress patient to next Phase/Stage  - Instruct patient to call for assistance with activity based on assessment  - Consider rehabilitation consult to assist with strengthening/weightbearing, etc   Outcome: Progressing     Problem: DISCHARGE PLANNING  Goal: Discharge to home or other facility with appropriate resources  Description: INTERVENTIONS:  - Identify barriers to discharge w/patient and caregiver  - Arrange for needed discharge resources and transportation as appropriate  - Identify discharge learning needs (meds, wound care, etc )  - Arrange for interpretive services to assist at discharge as needed  - Refer to Case Management Department for coordinating discharge planning if the patient needs post-hospital services based on physician/advanced practitioner order or complex needs related to functional status, cognitive ability, or social support system  Outcome: Progressing

## 2020-10-03 NOTE — UTILIZATION REVIEW
Notification of Maternity/Delivery & [place] Birth Information for Admission   Notification of Maternity/Delivery for Admission to our facility [address]  Be advised that this patient was admitted to our facility under Inpatient Status  Contact Nia Timoteo at [phone] for additional admission information  [facility] DEPT  DEDICATED [phone]  Mother &  Information   Patient Name: Mariangel Noonan YOB: 1980   Delivering clinician: Uzma Tran   OB History        3    Para   2    Term   1       1    AB   1    Living   2       SAB        TAB   1    Ectopic        Multiple   0    Live Births   2           Obstetric Comments   Menarche: 15   x 1                Name & MRN:   Information for the patient's :  Munir Sharp Girl Rossiberhane Giles) [[id number]]     [place] Delivery Information:  Sex: female  Delivered 10/3/2020 4:45 AM by Vaginal, Spontaneous; Gestational Age: 43w3d     Measurements:  Weight: 7 lb 1 8 oz (3225 g); Height: 19 5"    APGAR 1 minute 5 minutes 10 minutes   Totals: 9 9      [place] Birth Information: 44 y o  female MRN: [id number] Unit/Bed#: L&D 322-01 Estimated Date of Delivery: 10/30/20  Birthweight: No birth weight on file  Gestational Age: <None> Delivery Type: Vaginal, Spontaneous          APGARS  One minute Five minutes Ten minutes   Totals:                 [address]   [address]:   [facility]  Tax ID: [id number]  NPI: [id number] Attending Provider/NPI:  Phone:  Address: Uzma Tran, [place] [[id number]]  [phone]  Same as Facility   Place of Service Code: 24 Place of Service Name: [address] [place] [address]   Start Date: 10/2/20 8721   Discharge Date & Time: No discharge date for patient encounter      Type of Admission: Inpatient Status Discharge Disposition (if discharged): Home/Self Care   Patient Diagnoses:   Encounter for full-term uncomplicated delivery [Q47]  The encounter diagnosis was 36 weeks gestation of pregnancy  1  36 weeks gestation of pregnancy       Orders: Admission Orders (From admission, onward)     Ordered        10/02/20 0648  Inpatient Admission  Once                    Assigned Utilization Review Contact: Sharif Galeano Utilization Review Department  Phone: [phone]; Fax [phone]  Email: [email or web address]

## 2020-10-03 NOTE — OB LABOR/OXYTOCIN SAFETY PROGRESS
Oxytocin Safety Progress Check Note - Paula Mckeon 44 y o  female MRN: [id number]    Unit/Bed#: L&D 322-01 Encounter: [id number]    Dose (ojy-units/min) Oxytocin: 6 joy-units/min  Contraction Frequency (minutes): 2-4  Contraction Quality: Moderate  Tachysystole: No   Cervical Dilation: 4        Cervical Effacement: 60  Fetal Station: -2  Baseline Rate: 150 bpm  Fetal Heart Rate: 150 BPM  FHR Category: Category II               Vital Signs:   Vitals:    10/03/20 0115   BP: 116/58   Pulse: 85   Resp:    Temp:    SpO2:            Notes/comments:   Late and variable decelerations noted  Pt re-examined without significant change  IUPC placed  Large gush of fluid noted   Will change position and continue to monitor       Suad Gaffney MD 10/3/2020 1:50 AM

## 2020-10-03 NOTE — OB LABOR/OXYTOCIN SAFETY PROGRESS
Oxytocin Safety Progress Check Note - Deepika Cueva 44 y o  female MRN: [id number]    Unit/Bed#: L&D 322-01 Encounter: [id number]    Dose (joy-units/min) Oxytocin: 12 joy-units/min  Contraction Frequency (minutes): 2-5  Contraction Quality: Mild  Tachysystole: No   Cervical Dilation: 5        Cervical Effacement: 70  Fetal Station: -2  Baseline Rate: 145 bpm  Fetal Heart Rate: 150 BPM  FHR Category: Category II               Vital Signs:   Vitals:    10/03/20 0015 10/03/20 0019 10/03/20 0022 10/03/20 0023   BP: 116/74 125/80 124/84    Pulse: 95 104 96 (!) 122   Resp:       Temp:       TempSrc:       SpO2:  100%  99%   Weight:       Height:               Notes/comments:    Evaluated patient after noted deceleration after epidural placement  Appeared to have late decelerations, progressively worsening  Blood pressure down from 981Z systolic to 74E systolic on arrival in room  Maternal repositioning undertaken  SVE, found to be changed to 5/70/-2  AROM for clear fluid and FSE placed for improved FHR tracing  FHR in 50s-60s on initial FSE tracing  Maternal repositioning undertaken again, oxygen given, pitocin held  During this time, unable to obtain blood pressure  Patient remained asymptomatic  FHR improved to 130s, with subsequent repeat late deceleration to 90s  Improved to 140s and remained stable  Total time of deceleration 8 minutes with several brief recoveries in between contractions  Will continue to hold pitocin until Category I tracing for another 30 minutes  Dr Rod Piper present for evaluation and treatment      Jon De Paz MD 10/3/2020 12:47 AM

## 2020-10-03 NOTE — L&D DELIVERY NOTE
Delivery Summary - OB/GYN   Kat Strange 44 y o  female MRN: [id number]  Unit/Bed#: L&D 322-01 Encounter: [id number]    Pre-delivery Diagnosis:   1  36w1d pregnancy  2  Cholestasis of pregnancy  3  GBS positive  4  AMA  5  Rh negative  6  Obesity  7  H O HSV  8  Abnormal 1 hour gtt with normal 3 hour GTT    Post-delivery Diagnosis: same, delivered    Attending: Sharlene Hoang    Assistant(s): No qualified resident available    Procedure: , repair of first degree laceration    Anesthesia: epidural    Quantified Blood Loss:  119 mL    Specimens:   1  Arterial and venous cord gases  2  Cord blood  3  Segment of umbilical cord  4  Placenta to storage     Complications:  None apparent    Findings:  1  Viable female  delivered on 10/03/20 at 0445 weight pending;  Apgar scores of 9 at one minute and 9 at five minutes  2  Spontaneous delivery of placenta with centrally inserted 3-vessel cord  3  1st degree perineal laceration, repaired with 2-0Vicryl rapid       Disposition: Patient tolerated the procedure well and was recovering in labor and delivery room with family and  before being transferred to the post-partum floor  Procedure Details     Description of procedure    After pushing for 3 minutes, on 10/03/20 at 0445 patient delivered a viable female , weight pending, Apgars of 9 (1 min) and 9 (5 min)  The fetal vertex delivered spontaneously  There was no nuchal cord  The anterior shoulder delivered atraumatically with maternal expulsive forces and the assistance of downward traction  The posterior shoulder delivered with maternal expulsive forces and the assistance of upward traction  The remainder of the fetus delivered spontaneously  Upon delivery, the infant was placed on the mothers abdomen and the cord was clamped and cut  Delayed cord clamping was achieved  The infant was noted to cry spontaneously and was moving all extremities appropriately   There was no evidence for injury  Awaiting nurse resuscitators evaluated the  at bedside  Arterial and venous cord blood gases and cord blood was collected for analysis  These were promptly sent to the lab  In the immediate post-partum, 30 units of IV pitocin was administered and the uterus was noted to contract down well with massage and pitocin  The placenta delivered spontaneously at 0447 and was noted to have a centrally inserted 3 vessel cord  The vagina, cervix, and perineum were inspected and there was noted to be a first degree laceration  Laceration Repair  Patient was comfortable with epidural at that time  A first degree laceration was identified and required repair  Laceration was repaired with 2-0 Vicryl rapide with a figure of eight suture to reapproximate the laceration  Good hemostasis was confirmed at the conclusion of this procedure  At the conclusion of the delivery, all needle, sponge, and instrument counts were noted to be correct  Patient tolerated the procedure well and was allowed to recover in labor and delivery room with family and  before being transferred to the post-partum floor       Lisa Waldrop MD

## 2020-10-03 NOTE — DISCHARGE INSTRUCTIONS
Self Care After Delivery   AMBULATORY CARE:   The postpartum period  is the period of time from delivery to about 6 weeks  During this time you may experience many physical and emotional changes  It is important to understand what is normal and when you need to call your healthcare provider  It is also important to know how to care for yourself during this time  Call 911 for any of the following:   · You soak through 1 pad in 15 minutes, have blurry vision, clammy or pale skin, and feel faint  · You faint or lose consciousness  · Your heart is beating faster than normal      · You have trouble breathing  · You cough up blood  Seek care immediately if:   · You soak through 1 or more pads in an hour, or pass blood clots larger than a quarter from your vagina  · Your leg is painful, red, and larger than normal      · You have severe abdominal pain  · You have a bad headache or changes in your vision  · Your episiotomy or C section incision is red, swollen, bleeding, or draining pus  · Your incision comes apart  · You see or hear things that are not there, or have thoughts of harming yourself or your baby  Contact your obstetrician or midwife if:   · You have a fever  · You have new or worsening pain in your abdomen or vagina  · You continue to have the baby blues 10 days after you deliver  · You have trouble sleeping  · You have foul-smelling discharge from your vagina  · You have pain or burning when you urinate  · You do not have a bowel movement for 3 days or more  · You have nausea or are vomiting  · You have hard lumps or red streaks over your breasts  · You have cracked nipples or bleed from your nipples  · You have questions or concerns about your condition or care  Physical changes:   The following are normal changes after you give birth:  · Pain in the area between your anus and vagina    · Breast pain    · Constipation or hemorrhoids    · Hot or cold flashes    · Vaginal bleeding or discharge    · Mild to moderate abdominal cramping    · Difficulty controlling bowel movements or urine  Emotional changes: The following are symptoms of the baby blues, or normal emotions after you give birth  The changes in your emotions may be caused by a drop in hormone levels after you deliver  If these symptoms last longer than 1 to 2 weeks after you give birth, you may have postpartum depression  · Feeling irritable    · Feeling sad    · Crying for no reason    · Feeling anxious  Breast care for nursing mothers: You may have sore breasts for 3 to 6 days after you give birth  This happens as your milk begins to fill your breasts  You may also have sore breasts if you do not breastfeed frequently  Do the following to care for your breasts:  · Apply a moist, warm, compress to your breast as directed  This may help soothe your breasts  Make sure the washcloth is not too hot before you apply it to your breast      · Nurse your baby or pump your milk frequently  This may prevent clogged milk ducts  Ask your healthcare provider how often to nurse or pump  · Massage your breasts as directed  This may help increase your milk flow  Gently rub your breasts in a circular motion before you breastfeed  You may need to gently squeeze your breast or nipple to help release milk  You can also use a breast pump to help release milk from your breast      · Wash your breasts with warm water only  Do not put soap on your nipples  Soap may cause your nipples to become dry  · Apply lanolin cream to your nipples as directed  Lanolin cream may add moisture to your skin and prevent nipple dryness  Always  wash off lanolin cream with warm water before you breastfeed  · Place pads in your bra  Your nipples may leak milk when you are not breastfeeding  You can place pads inside of your bra to help prevent leaking onto your clothing   Ask your healthcare provider where to purchase bra pads  · Get breastfeeding support if needed  There are healthcare providers who can answer questions about breastfeeding and provide you with support  Ask your healthcare provider who you can contact if you need breastfeeding support  Breast care for non-nursing mothers:  Milk will fill your breasts even if you bottle feed your baby  Do the following to help stop your milk from filling your breasts and causing pain:  · Wear a bra with support at all times  A sports bra or a tight-fitting bra will help stop your milk from coming in  · Apply ice on each breast for 15 to 20 minutes every hour or as directed  Use an ice pack, or put crushed ice in a plastic bag  Cover it with a towel  Ice helps your milk ducts shrink  · Keep your breasts away from warm water  Warm water will make it easier for milk to fill your breasts  Stand with your breasts away from warm water in the shower  · Limit how much you touch your breasts  This will prevent them from filling with milk  Perineum care: Your perineum is the area between your rectum and vagina  It is normal to have swelling and pain in this area after you give birth  If you had an episiotomy, your healthcare provider may give you special instructions  · Clean your perineum after you use the bathroom  This may prevent infection and help with healing  Use a spray bottle with warm water to clean your perineum  You may also gently spray warm water against your perineum when you urinate  Always wipe front to back  · Take a sitz bath as directed  A sitz bath may help relieve swelling and pain  Fill your bath tub or bucket with water up to your hips and sit in the water  Use cold water for 2 days after you deliver  Then use warm water  Ask your healthcare provider for more information about a sitz bath  · Apply ice packs for the first 24 hours or as directed  Use a plastic glove filled with ice or buy an ice pack   Wrap the ice pack or plastic glove in a small towel or wash cloth  Place the ice pack on your perineum for 20 minutes at a time  · Sit on a donut-shaped pillow  This may relieve pressure on your perineum when you sit  · Use wipes with medicine or take pills as directed  Your healthcare provider may tell you to use witch hazel pads  You can place witch hazel pads in the refrigerator before you apply them to your perineum  He may also tell you to take NSAIDs  Ask your healthcare provider how often to take pills or use wipes with medicine  · Do not go swimming or take tub baths for 4 to 6 weeks or as directed  This will help prevent an infection in your vagina or uterus  Bowel and bladder care: It may take 3 to 5 days to have a bowel movement after you deliver your baby  You can do the following to prevent or manage constipation, and get control of your bowel or bladder:  · Take stool softeners as directed  A stool softener is medicine that will make your bowel movements softer  This may prevent or relieve constipation  A stool softener may also make bowel movements less painful  · Drink plenty of liquids  Ask how much liquid to drink each day and which liquids are best for you  Liquids may help prevent constipation  · Eat foods high in fiber  Examples include fruits, vegetables, grains, beans, and lentils  Ask your healthcare provider how much fiber you need each day  Fiber may prevent constipation  · Do Kegel exercises as directed  Kegel exercises will help strengthen the muscles that control bowel movements and urination  Ask your healthcare provider for more information on Kegel exercises  · Apply cold compresses or medicine to hemorrhoids as directed  This may relieve swelling and pain  Your healthcare provider may tell you to apply ice or wipes with medicine to your hemorrhoids  He may also tell you to use a sitz bath   Ask your healthcare for more information on how to manage hemorrhoids  Nutrition:  Good nutrition is important in the postpartum period  It will help you return to a healthy weight, increase your energy levels, and prevent constipation  It will also help you get enough nutrients and calories if you are going to breastfeed your baby  · Eat a variety of healthy foods  Healthy foods include fruits, vegetables, whole-grain breads, low-fat dairy products, beans, lean meats, and fish  You may need 500 to 700 additional calories each day if you breastfeed your baby  You may also need extra protein  · Limit foods with added sugar and high amounts of fat  These foods are high in calories and low in healthy nutrients  Read food labels so you know how much sugar and fat is in the food you want to eat  · Drink 8 to 10 glasses of water per day  Water will help you make plenty of milk for your baby  It will also help prevent constipation  Drink a glass of water every time you breastfeed your baby  · Take vitamins as directed  Ask your healthcare provider what vitamins you need  · Limit caffeine and alcohol if you are breastfeeding  Caffeine and alcohol can get into your breast milk  Caffeine and alcohol can make your baby fussy  They can also interfere with your baby's sleep  Ask your healthcare provider if you can drink alcohol or caffeine  Rest and sleep: You may feel very tired in the postpartum period  Enough sleep will help you heal and give you energy to care for your baby  The following may help you get sleep and rest:  · Nap when your baby naps  Your baby may nap several times during the day  Get rest during this time  · Limit visitors  Many people may want to see you and your baby  Ask friends or family to visit on different days  This will give you time to rest      · Do not plan too much for one day  Put off household chores so that you have time to rest  Gradually do more each day  · Ask for help from family, friends, or neighbors    Ask them to help you with laundry, cleaning, or errands  Also ask someone to watch the baby while you take a nap or relax  Ask your partner to help with the care of your baby  Pump some of your breast milk so your partner can feed your baby during the night  Exercise after delivery:  Wait until your healthcare provider says it is okay to exercise  Exercise can help you lose weight, increase your energy levels, and manage your mood  It can also prevent constipation and blood clots  Start with gentle exercises such as walking  Do more as you have more energy  You may need to avoid abdominal exercises for 1 to 2 weeks after you deliver  Talk to your healthcare provider about an exercise plan that is right for you  Sexual activity after delivery:   · Do not have sex until your healthcare provider says it is okay  You may need to wait 4 to 6 weeks before you have sex  This may prevent infection and allow time to heal      · Your menstrual cycle may begin as soon as 3 weeks after you deliver  Your period may be delayed if you breastfeed your baby  You can become pregnant before you get your first postpartum period  Talk to your healthcare provider about birth control that is right for you  Some types of birth control are not safe during breastfeeding  For support and more information:  Join a support group for new mothers  Ask for help from family and friends with chores, errands, and care of your baby  · Office of Women's Health, [place] Department of Health and Human Services  [address], [id number] [place] [address]  [address], [id number] [place] [address]  Phone: 4- 760 - [phone]  Web Address: www womenshealth gov  · [facility]partum [address] [facility] , [address] [place] [address]  [address]  Web Address: [employer]  org/pregnancy/postpartum-care  aspx  Follow up with your obstetrician or midwife as directed:   You will need to follow up with your healthcare provider in 2 to 6 weeks after delivery  Write down your questions so you remember to ask them at your visits  © 2017 [employer] Information is for End User's use only and may not be sold, redistributed or otherwise used for commercial purposes  All illustrations and images included in CareNotes® are the copyrighted property of A D A M , Inc  or David Rice  The above information is an  only  It is not intended as medical advice for individual conditions or treatments  Talk to your doctor, nurse or pharmacist before following any medical regimen to see if it is safe and effective for you

## 2020-10-03 NOTE — DISCHARGE SUMMARY
partum course was remarkable for rubella non-immunity; she received an MMR vaccine postpartum  She desires micronor for PPBC    On day of discharge, she was ambulating and able to reasonably perform all ADLs  She was voiding and had appropriate bowel function  Pain was well controlled  She was discharged home on postpartum day #2 without complications  Patient was instructed to follow up with her OB as an outpatient and was given appropriate warnings to call provider if she develops signs of infection or uncontrolled pain  Condition at discharge:   good     Disposition:   See After Visit Summary for discharge disposition information  Planned Readmission:   No    Discharge Medications:   Please see after visit summary for full list of discharge medications  Discharge instructions :   -Do not place anything (no partner, tampons or douche) in your vagina for 6 weeks  -You may walk for exercise for the first 6 weeks then gradually return to your usual activities    -Please do not drive for 1 week if you have no stitches and for 2 weeks if you have stitches or underwent a  delivery     -You may take baths or shower per your preference    -Please look at your bust (breasts) in the mirror daily and call provider for redness or tenderness or increased warmth  - If you have had a  please look at your incision daily as well and call provider for increasing redness or steady drainage from the incision    -Please call your provider if temperature > 100 4*F or 38* C, worsening pain or a foul discharge

## 2020-10-03 NOTE — LACTATION NOTE
This note was copied from a baby's chart  Met with mother  Provided mother with Ready, Set, Baby booklet  Discussed Skin to Skin contact an benefits to mom and baby  Talked about the delay of the first bath until baby has adjusted  Spoke about the benefits of rooming in  Feeding on cue and what that means for recognizing infant's hunger  Avoidance of pacifiers for the first month discussed  Talked about exclusive breastfeeding for the first 6 months  Positioning and latch reviewed as well as showing images of other feeding positions  Discussed the properties of a good latch in any position  Reviewed hand/manual expression  Discussed s/s that baby is getting enough milk and some s/s that breastfeeding dyad may need further help  Gave information on common concerns, what to expect the first few weeks after delivery, preparing for other caregivers, and how partners can help  Resources for support also provided  LPI not latching yet very sleepy  Encouraged mom to hand express and feed baby via syringe if baby doesn't latch  Encouraged mom to call at next feeding time for latch assistance  Encoraged MOB  to call for assistance, questions and concerns  Extension number for inpatient lactation support provided

## 2020-10-04 PROCEDURE — 99024 POSTOP FOLLOW-UP VISIT: CPT | Performed by: OBSTETRICS & GYNECOLOGY

## 2020-10-04 RX ADMIN — ACETAMINOPHEN 650 MG: 325 TABLET ORAL at 19:54

## 2020-10-04 RX ADMIN — IBUPROFEN 600 MG: 600 TABLET, FILM COATED ORAL at 09:12

## 2020-10-04 RX ADMIN — ACETAMINOPHEN 650 MG: 325 TABLET ORAL at 03:19

## 2020-10-04 RX ADMIN — DOCUSATE SODIUM 100 MG: 100 CAPSULE, LIQUID FILLED ORAL at 18:37

## 2020-10-04 RX ADMIN — ACETAMINOPHEN 650 MG: 325 TABLET ORAL at 13:57

## 2020-10-04 RX ADMIN — DOCUSATE SODIUM 100 MG: 100 CAPSULE, LIQUID FILLED ORAL at 08:32

## 2020-10-04 RX ADMIN — OXYCODONE HYDROCHLORIDE 5 MG: 5 TABLET ORAL at 05:00

## 2020-10-04 RX ADMIN — IBUPROFEN 600 MG: 600 TABLET, FILM COATED ORAL at 16:37

## 2020-10-04 NOTE — PLAN OF CARE
Problem: PAIN - ADULT  Goal: Verbalizes/displays adequate comfort level or baseline comfort level  Description: Interventions:  - Encourage patient to monitor pain and request assistance  - Assess pain using appropriate pain scale  - Administer analgesics based on type and severity of pain and evaluate response  - Implement non-pharmacological measures as appropriate and evaluate response  - Consider cultural and social influences on pain and pain management  - Notify physician/advanced practitioner if interventions unsuccessful or patient reports new pain  Outcome: Progressing     Problem: INFECTION - ADULT  Goal: Absence or prevention of progression during hospitalization  Description: INTERVENTIONS:  - Assess and monitor for signs and symptoms of infection  - Monitor lab/diagnostic results  - Monitor all insertion sites, i e  indwelling lines, tubes, and drains  - Monitor nasal secretions for changes in amount and color  - [place] appropriate cooling/warming therapies per order  - Administer medications as ordered  - Instruct and encourage patient and family to use good hand hygiene technique  - Identify and instruct in appropriate isolation precautions for identified infection/condition  Outcome: Progressing     Problem: SAFETY ADULT  Goal: Patient will remain free of falls  Description: INTERVENTIONS:  - Assess patient frequently for physical needs  -  Identify cognitive and physical deficits and behaviors that affect risk of falls    -  [place] fall precautions as indicated by assessment   - Educate patient/family on patient safety including physical limitations  - Instruct patient to call for assistance with activity based on assessment  - Modify environment to reduce risk of injury  - Consider OT/PT consult to assist with strengthening/mobility  Outcome: Progressing  Goal: Maintain or return to baseline ADL function  Description: INTERVENTIONS:  -  Assess patient's ability to carry out ADLs; assess patient's baseline for ADL function and identify physical deficits which impact ability to perform ADLs (bathing, care of mouth/teeth, toileting, grooming, dressing, etc )  - Assess/evaluate cause of self-care deficits   - Assess range of motion  - Assess patient's mobility; develop plan if impaired  - Assess patient's need for assistive devices and provide as appropriate  - Encourage maximum independence but intervene and supervise when necessary  - Involve family in performance of ADLs  - Assess for home care needs following discharge   - Consider OT consult to assist with ADL evaluation and planning for discharge  - Provide patient education as appropriate  Outcome: Progressing  Goal: Maintain or return mobility status to optimal level  Description: INTERVENTIONS:  - Assess patient's baseline mobility status (ambulation, transfers, stairs, etc )    - Identify cognitive and physical deficits and behaviors that affect mobility  - Identify mobility aids required to assist with transfers and/or ambulation (gait belt, sit-to-stand, lift, walker, cane, etc )  - [place] fall precautions as indicated by assessment  - Record patient progress and toleration of activity level on Mobility SBAR; progress patient to next Phase/Stage  - Instruct patient to call for assistance with activity based on assessment  - Consider rehabilitation consult to assist with strengthening/weightbearing, etc   Outcome: Progressing     Problem: DISCHARGE PLANNING  Goal: Discharge to home or other facility with appropriate resources  Description: INTERVENTIONS:  - Identify barriers to discharge w/patient and caregiver  - Arrange for needed discharge resources and transportation as appropriate  - Identify discharge learning needs (meds, wound care, etc )  - Arrange for interpretive services to assist at discharge as needed  - Refer to Case Management Department for coordinating discharge planning if the patient needs post-hospital services based on physician/advanced practitioner order or complex needs related to functional status, cognitive ability, or social support system  Outcome: Progressing

## 2020-10-04 NOTE — LACTATION NOTE
This note was copied from a baby's chart  Met with mother after RN reported low blood sugar  Assisted mom to unwrap baby and place skin to skin in football hold on the right breast  Waking strategies reviewed with demtopher  Worked on positioning infant up at chest level and starting to feed infant with nose arriving at the nipple  Then, using areolar compression to achieve a deep latch that is comfortable and exchanges optimum amounts of milk  Mom able to hand express drops of colostrum to facilitate latch  Baby with signs of deep latch but needs constant stimulation to continue to suck, mom expressed comfort with latch and position  Encouraged parents to call for assistance, questions, and concerns about breastfeeding  Extension provided

## 2020-10-04 NOTE — LACTATION NOTE
This note was copied from a baby's chart  Met with parents to discuss donor milk and alternative feeding tools  Mom consented to DM d/t low blood sugar  Parents decided they would like to cup feed infant and if cup feeding is not successful then bottle feeding, they are not interested in the SNS  Baby tolerated cup feeding very well, was able to drink 20 ml DM  Encouraged mom to attempt to feed baby at the breast every 2-3 hours and hand express for baby if baby does not latch, then feed doctor ordered DM  Encouraged parents to call for assistance, questions, and concerns about breastfeeding  Extension provided

## 2020-10-04 NOTE — PROGRESS NOTES
Progress Note - OB/GYN   Elba Hoffman 44 y o  female MRN: [id number]  Unit/Bed#: L&D 309-01 Encounter: [id number]    Assessment:  44 y o  [id number] s/p Spontaneous Vaginal Delivery post-operative day 1    Plan:  1  Routine post-partum care  2  Encourage ambulation  3  Pain control as needed  4  Advance diet as tolerated  5  Rhogam ordered  6  Considering early discharge today    Subjective/Objective   Chief Complaint:       Subjective:  Elba Hoffman is well appearing and has no complaints at this time  She denies any dizziness, nausea, vomiting, chest pain, shortness of breath, palpitations, or headaches  Pain: Well controlled with pain medication regimen  Tolerating PO: yes  Voiding: yes  Flatus: yes  BM: no  Ambulating: yes  Breastfeeding: yes  Chest pain: no  Shortness of breath: no  Leg pain: no  Lochia: Decreasing    Objective:     Vitals: Blood pressure 117/75, pulse 64, temperature 97 9 °F (36 6 °C), temperature source Oral, resp  rate 16, height 5' 4 5" (1 638 m), weight 98 4 kg (217 lb), last menstrual period 01/24/2020, SpO2 99 %, currently breastfeeding      Intake/Output Summary (Last 24 hours) at 10/4/2020 0816  Last data filed at 10/3/2020 1301  Gross per 24 hour   Intake --   Output 1600 ml   Net -1600 ml       Physical Exam:     General: NAD  Cardiovascular: RRR, no murmur, nl S1/S2   Lungs: CTAB, non-labored breathing   Abdomen: Soft, no distension/rebound/guarding/tenderness   Fundus: Firm, non-tender, fundus: -3 cm below the umbilicus   Lower Extremities: Non-tender      Lab, Imaging and other studies:     Recent Results (from the past 72 hour(s))   Type and screen    Collection Time: 10/02/20  7:08 AM   Result Value Ref Range    ABO Grouping O     Rh Factor Negative     Antibody Screen Positive     Specimen Expiration Date 20201005    CBC    Collection Time: 10/02/20  7:08 AM   Result Value Ref Range    WBC 10 57 (H) 4 31 - 10 16 Thousand/uL    RBC 3 51 (L) 3 81 - 5 12 Million/uL Hemoglobin 11 0 (L) 11 5 - 15 4 g/dL    Hematocrit 33 5 (L) 34 8 - 46 1 %    MCV 95 82 - 98 fL    MCH 31 3 26 8 - 34 3 pg    MCHC 32 8 31 4 - 37 4 g/dL    RDW 11 8 11 6 - 15 1 %    Platelets 932 264 - 575 Thousands/uL    MPV 10 5 8 9 - 12 7 fL   RPR    Collection Time: 10/02/20  7:08 AM   Result Value Ref Range    RPR Non-Reactive Non-Reactive   Antibody identification    Collection Time: 10/02/20  7:08 AM   Result Value Ref Range    ANTIBODY ID   #1 Passive D Antibody, Patient Received RHIG    Fingerstick Glucose (POCT)    Collection Time: 10/02/20  7:28 AM   Result Value Ref Range    POC Glucose 83 65 - 140 mg/dl   Fingerstick Glucose (POCT)    Collection Time: 10/02/20 11:35 AM   Result Value Ref Range    POC Glucose 91 65 - 140 mg/dl   Fingerstick Glucose (POCT)    Collection Time: 10/02/20  3:58 PM   Result Value Ref Range    POC Glucose 69 65 - 140 mg/dl   Fingerstick Glucose (POCT)    Collection Time: 10/02/20  5:58 PM   Result Value Ref Range    POC Glucose 70 65 - 140 mg/dl   Fingerstick Glucose (POCT)    Collection Time: 10/03/20  3:35 AM   Result Value Ref Range    POC Glucose 71 65 - 140 mg/dl   Blood gas, arterial, cord    Collection Time: 10/03/20  4:46 AM   Result Value Ref Range    pH, Cord Art 7 279 7 230 - 7 430    pCO2, Cord Art 50 2 30 0 - 60 0    pO2, Cord Art 22 9 5 0 - 25 0 mm HG    HCO3, Cord Art 23 0 17 3 - 27 3 mmol/L    Base Exc, Cord Art -4 3 (L) 3 0 - 11 0 mmol/L    O2 Content, Cord Art 10 0 ml/dl    O2 Hgb, Arterial Cord 44 3 %   Blood gas, venous, cord    Collection Time: 10/03/20  4:46 AM   Result Value Ref Range    pH, Cord [place] 7 357 7 190 - 7 490    pCO2, Cord [place] 41 0 27 0 - 43 0 mm HG    pO2, Cord [place] 22 5 15 0 - 45 0 mm HG    HCO3, Cord [place] 22 5 12 2 - 28 6 mmol/L    Base Exc, Cord [place] -2 8 (L) 1 0 - 9 0 mmol/L    O2 Cont, Cord [place] 11 9 mL/dL    O2 HGB,VENOUS CORD 54 7 %   Postpartum Rhogam    Collection Time: 10/03/20 12:35 PM   Result Value Ref Range    ABO Grouping O     Rh Factor Negative     Antibody Screen Positive     Fetal Bleed Screen Negative      Meds:  docusate sodium, 100 mg, Oral, BID      acetaminophen, 650 mg, Q6H PRN  albuterol, 2 puff, Q6H PRN  benzocaine-menthol-lanolin-aloe, , 4x Daily PRN  bisacodyl, 10 mg, Daily PRN  calcium carbonate, 1,000 mg, TID PRN  diphenhydrAMINE, 25 mg, Q6H PRN  diphenhydrAMINE, 25 mg, Q6H PRN  hydrocortisone, 1 application, 4x Daily PRN  ibuprofen, 600 mg, Q6H PRN  ondansetron, 4 mg, Q6H PRN  oxyCODONE, 5 mg, Q4H PRN  simethicone, 80 mg, Q6H PRN  witch hazel-glycerin, 1 pad, Q2H PRN              Signature / Title: Vearl Boxer, MD, Ob/Gyn, PGY-2  Date: 10/4/2020  Time: 8:16 AM

## 2020-10-05 VITALS
WEIGHT: 217 LBS | HEIGHT: 65 IN | OXYGEN SATURATION: 99 % | HEART RATE: 67 BPM | TEMPERATURE: 98 F | SYSTOLIC BLOOD PRESSURE: 116 MMHG | BODY MASS INDEX: 36.15 KG/M2 | DIASTOLIC BLOOD PRESSURE: 80 MMHG | RESPIRATION RATE: 17 BRPM

## 2020-10-05 PROCEDURE — 99024 POSTOP FOLLOW-UP VISIT: CPT | Performed by: OBSTETRICS & GYNECOLOGY

## 2020-10-05 RX ORDER — ACETAMINOPHEN 325 MG/1
650 TABLET ORAL EVERY 6 HOURS PRN
Qty: 30 TABLET | Refills: 0 | Status: SHIPPED | OUTPATIENT
Start: 2020-10-05

## 2020-10-05 RX ORDER — IBUPROFEN 600 MG/1
600 TABLET ORAL EVERY 6 HOURS PRN
Qty: 30 TABLET | Refills: 0 | Status: SHIPPED | OUTPATIENT
Start: 2020-10-05 | End: 2020-11-12

## 2020-10-05 RX ORDER — DIAPER,BRIEF,INFANT-TODD,DISP
1 EACH MISCELLANEOUS 2 TIMES DAILY
Qty: 30 G | Refills: 0 | Status: SHIPPED | OUTPATIENT
Start: 2020-10-05 | End: 2020-11-12

## 2020-10-05 RX ADMIN — DOCUSATE SODIUM 100 MG: 100 CAPSULE, LIQUID FILLED ORAL at 08:10

## 2020-10-05 RX ADMIN — IBUPROFEN 600 MG: 600 TABLET, FILM COATED ORAL at 01:01

## 2020-10-05 RX ADMIN — ACETAMINOPHEN 650 MG: 325 TABLET ORAL at 04:42

## 2020-10-05 RX ADMIN — ACETAMINOPHEN 650 MG: 325 TABLET ORAL at 11:39

## 2020-10-05 NOTE — PLAN OF CARE
Problem: PAIN - ADULT  Goal: Verbalizes/displays adequate comfort level or baseline comfort level  Description: Interventions:  - Encourage patient to monitor pain and request assistance  - Assess pain using appropriate pain scale  - Administer analgesics based on type and severity of pain and evaluate response  - Implement non-pharmacological measures as appropriate and evaluate response  - Consider cultural and social influences on pain and pain management  - Notify physician/advanced practitioner if interventions unsuccessful or patient reports new pain  Outcome: Progressing     Problem: INFECTION - ADULT  Goal: Absence or prevention of progression during hospitalization  Description: INTERVENTIONS:  - Assess and monitor for signs and symptoms of infection  - Monitor lab/diagnostic results  - Monitor all insertion sites, i e  indwelling lines, tubes, and drains  - Monitor nasal secretions for changes in amount and color  - [place] appropriate cooling/warming therapies per order  - Administer medications as ordered  - Instruct and encourage patient and family to use good hand hygiene technique  - Identify and instruct in appropriate isolation precautions for identified infection/condition  Outcome: Progressing     Problem: SAFETY ADULT  Goal: Patient will remain free of falls  Description: INTERVENTIONS:  - Assess patient frequently for physical needs  -  Identify cognitive and physical deficits and behaviors that affect risk of falls    -  [place] fall precautions as indicated by assessment   - Educate patient/family on patient safety including physical limitations  - Instruct patient to call for assistance with activity based on assessment  - Modify environment to reduce risk of injury  - Consider OT/PT consult to assist with strengthening/mobility  Outcome: Progressing  Goal: Maintain or return to baseline ADL function  Description: INTERVENTIONS:  -  Assess patient's ability to carry out ADLs; assess patient's baseline for ADL function and identify physical deficits which impact ability to perform ADLs (bathing, care of mouth/teeth, toileting, grooming, dressing, etc )  - Assess/evaluate cause of self-care deficits   - Assess range of motion  - Assess patient's mobility; develop plan if impaired  - Assess patient's need for assistive devices and provide as appropriate  - Encourage maximum independence but intervene and supervise when necessary  - Involve family in performance of ADLs  - Assess for home care needs following discharge   - Consider OT consult to assist with ADL evaluation and planning for discharge  - Provide patient education as appropriate  Outcome: Progressing  Goal: Maintain or return mobility status to optimal level  Description: INTERVENTIONS:  - Assess patient's baseline mobility status (ambulation, transfers, stairs, etc )    - Identify cognitive and physical deficits and behaviors that affect mobility  - Identify mobility aids required to assist with transfers and/or ambulation (gait belt, sit-to-stand, lift, walker, cane, etc )  - [place] fall precautions as indicated by assessment  - Record patient progress and toleration of activity level on Mobility SBAR; progress patient to next Phase/Stage  - Instruct patient to call for assistance with activity based on assessment  - Consider rehabilitation consult to assist with strengthening/weightbearing, etc   Outcome: Progressing     Problem: DISCHARGE PLANNING  Goal: Discharge to home or other facility with appropriate resources  Description: INTERVENTIONS:  - Identify barriers to discharge w/patient and caregiver  - Arrange for needed discharge resources and transportation as appropriate  - Identify discharge learning needs (meds, wound care, etc )  - Arrange for interpretive services to assist at discharge as needed  - Refer to Case Management Department for coordinating discharge planning if the patient needs post-hospital services based on physician/advanced practitioner order or complex needs related to functional status, cognitive ability, or social support system  Outcome: Progressing No

## 2020-10-05 NOTE — LACTATION NOTE
This note was copied from a baby's chart  Donor breast milk given to mom  Placed the milk on ice for the drive home

## 2020-10-05 NOTE — LACTATION NOTE
This note was copied from a baby's chart  Assisted mom with breastfeeding  Baby getting a shallow latch when I entered room  I demo  to mom how to get a deeper asymmetric latch  BAby latches for a few minutes and then slips off and fusses  We cont  to attempt for 10 minutes, and then baby started to get upset and was crying at the breast  Mom has been hand expressing to baby since birth, but I suggested it is time to start pumping, to stimulate her supply better  Mom agreeable  Baby is supplementing with donor milk, I got the donor milk and mom was going to supplement the baby and then call me to demo how to use the breast pump  Baby was taking the bottle so fast, the milk was running out of her mouth  I demo  to mom how to pace bottle feed and discussed the importance of slowing the flow down for baby  I enc mom to start triple feeds, so always offer the breast first, then pump and supplement

## 2020-10-05 NOTE — PROGRESS NOTES
Discharge teaching completed for mom and infant  Stressed car seat safety, safe sleep, and shaken baby, pt verbalized understanding  Appropriate questions asked and answered  Will continue to monitor

## 2020-10-05 NOTE — LACTATION NOTE
This note was copied from a baby's chart  Prepared donor milk for home use  I am thawing one of the bottles for mom  The following batch #'s will be given to mom upon discharge  #[id number](43)  #[id number](46)  #[id number](93)  #[id number](50)  #[id number](4)    Prescription and patient payment form faxed to milk bank

## 2020-10-05 NOTE — LACTATION NOTE
This note was copied from a baby's chart  Met with mother to go over discharge breastfeeding booklet including the feeding log  Emphasized 8 or more (12) feedings in a 24 hour period, what to expect for the number of diapers per day of life and the progression of properties of the  stooling pattern  Reviewed breastfeeding and your lifestyle, storage and preparation of breast milk, how to keep you breast pump clean, the employed breastfeeding mother and paced bottle feeding handouts  Booklet included Breastfeeding Resources for after discharge including access to the number for the [address]  Instructions given on pumping  Discussed when to start, frequency  I assisted her with the first pumping session and demo how to use the vacuum and cycles and how to clean the pump  Discussed hygiene of hands and supplies as well as assembly, placement of flanges, size of flanged, preparing the breast and cycles and suction settings on pump  Demonstrated use of hand pump  Discussed labeling of milk, storage, and preparation of stored milk

## 2020-10-05 NOTE — PROGRESS NOTES
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, and Rh negativity  Patient recovering well, Stable      Plan:  1  Postpartum  Continue routine post partum care  Pain management PRN  Encourage ambulation  Encourage breastfeeding    2  Rh neg status  Rhogam ordered    3   Discharge  Anticipate d/c today      Subjective/Objective   Chief Complaint:    Postpartum state    Subjective:   Pain: yes, cramping, improved with meds  Tolerating PO: yes  Voiding: yes  Flatus: yes  BM: yes  Ambulating: yes  Breastfeeding:  yes  Chest pain: no  Shortness of breath: no  Leg pain: no  Lochia: minimal    Objective:     Vitals: Temp:  [97 5 °F (36 4 °C)-98 1 °F (36 7 °C)] 98 1 °F (36 7 °C)  HR:  [67-80] 70  Resp:  [16-18] 16  BP: ()/(58-67) 113/67     No intake or output data in the 24 hours ending 10/05/20 0619      Physical Exam:   General: NAD, alert, oriented  Cardio: Regular rate and rhythm, no murmur  Resp: nonlabored breathing, clear to auscultation bilaterally  Abdomen: Soft, no distension/rebound/guarding/tenderness   Fundus: Firm, non-tender, fundus: 1cm below umbilicus  Incision: C/D/I  G/U: minimal lochia noted on pad  Lower Extremities: Non-tender, no palpable cords    Medications:  Current Facility-Administered Medications   Medication Dose Route Frequency    acetaminophen (TYLENOL) tablet 650 mg  650 mg Oral Q6H PRN    albuterol (PROVENTIL HFA,VENTOLIN HFA) inhaler 2 puff  2 puff Inhalation Q6H PRN    benzocaine-menthol-lanolin-aloe (DERMOPLAST) 20-0 5 % topical spray   Topical 4x Daily PRN    bisacodyl (DULCOLAX) rectal suppository 10 mg  10 mg Rectal Daily PRN    calcium carbonate (TUMS) chewable tablet 1,000 mg  1,000 mg Oral TID PRN    diphenhydrAMINE (BENADRYL) injection 25 mg  25 mg Intravenous Q6H PRN    diphenhydrAMINE (BENADRYL) tablet 25 mg  25 mg Oral Q6H PRN    docusate sodium (COLACE) capsule 100 mg  100 mg Oral BID    hydrocortisone 1 % cream 1 application  1 application Topical 4x Daily PRN    ibuprofen (MOTRIN) tablet 600 mg  600 mg Oral Q6H PRN    ondansetron (ZOFRAN) injection 4 mg  4 mg Intravenous Q6H PRN    oxyCODONE (ROXICODONE) IR tablet 5 mg  5 mg Oral Q4H PRN    simethicone (MYLICON) chewable tablet 80 mg  80 mg Oral Q6H PRN    witch hazel-glycerin (TUCKS) topical pad 1 pad  1 pad Topical Q2H PRN       Labs:   No results found for this or any previous visit (from the past 24 hour(s))        Percy Ge  Ob/Gyn PGY-1  10/5/2020  6:19 AM

## 2020-10-06 ENCOUNTER — TRANSITIONAL CARE MANAGEMENT (OUTPATIENT)
Dept: FAMILY MEDICINE CLINIC | Facility: CLINIC | Age: 40
End: 2020-10-06

## 2020-10-11 LAB — PLACENTA IN STORAGE: NORMAL

## 2020-11-12 ENCOUNTER — POSTPARTUM VISIT (OUTPATIENT)
Dept: OBGYN CLINIC | Facility: CLINIC | Age: 40
End: 2020-11-12
Payer: COMMERCIAL

## 2020-11-12 VITALS
TEMPERATURE: 96.7 F | HEART RATE: 58 BPM | WEIGHT: 187.2 LBS | SYSTOLIC BLOOD PRESSURE: 100 MMHG | BODY MASS INDEX: 31.64 KG/M2 | OXYGEN SATURATION: 99 % | DIASTOLIC BLOOD PRESSURE: 64 MMHG

## 2020-11-12 DIAGNOSIS — Z30.09 CONTRACEPTIVE EDUCATION: ICD-10-CM

## 2020-11-12 DIAGNOSIS — Z23 NEED FOR INFLUENZA VACCINATION: ICD-10-CM

## 2020-11-12 PROCEDURE — 90686 IIV4 VACC NO PRSV 0.5 ML IM: CPT | Performed by: OBSTETRICS & GYNECOLOGY

## 2020-11-12 PROCEDURE — 99024 POSTOP FOLLOW-UP VISIT: CPT | Performed by: OBSTETRICS & GYNECOLOGY

## 2020-11-12 PROCEDURE — 90471 IMMUNIZATION ADMIN: CPT | Performed by: OBSTETRICS & GYNECOLOGY

## 2020-11-12 RX ORDER — ACETAMINOPHEN AND CODEINE PHOSPHATE 120; 12 MG/5ML; MG/5ML
1 SOLUTION ORAL DAILY
Qty: 28 TABLET | Refills: 3 | Status: SHIPPED | OUTPATIENT
Start: 2020-11-12 | End: 2021-01-19 | Stop reason: SDUPTHER

## 2021-01-19 ENCOUNTER — TELEPHONE (OUTPATIENT)
Dept: OBGYN CLINIC | Facility: CLINIC | Age: 41
End: 2021-01-19

## 2021-01-19 DIAGNOSIS — Z30.09 CONTRACEPTIVE EDUCATION: ICD-10-CM

## 2021-01-19 RX ORDER — ACETAMINOPHEN AND CODEINE PHOSPHATE 120; 12 MG/5ML; MG/5ML
SOLUTION ORAL
Qty: 84 TABLET | Refills: 1 | OUTPATIENT
Start: 2021-01-19

## 2021-01-19 RX ORDER — ACETAMINOPHEN AND CODEINE PHOSPHATE 120; 12 MG/5ML; MG/5ML
1 SOLUTION ORAL DAILY
Qty: 28 TABLET | Refills: 0 | Status: SHIPPED | OUTPATIENT
Start: 2021-01-19 | End: 2021-02-04 | Stop reason: SDUPTHER

## 2021-01-19 NOTE — TELEPHONE ENCOUNTER
Pt called requesting a refill of her birth control to get her to her exam scheduled in February with Dr Susanna Gray

## 2021-02-03 ENCOUNTER — TELEPHONE (OUTPATIENT)
Dept: OBGYN CLINIC | Facility: CLINIC | Age: 41
End: 2021-02-03

## 2021-02-03 DIAGNOSIS — Z30.09 CONTRACEPTIVE EDUCATION: ICD-10-CM

## 2021-02-03 NOTE — TELEPHONE ENCOUNTER
Pt will need a refill on birth control to be sent to [facility] on file  It looks like a refill was sent in on 1/19/2021 but the pharmacy never got it

## 2021-02-04 RX ORDER — ACETAMINOPHEN AND CODEINE PHOSPHATE 120; 12 MG/5ML; MG/5ML
1 SOLUTION ORAL DAILY
Qty: 28 TABLET | Refills: 0 | Status: SHIPPED | OUTPATIENT
Start: 2021-02-04 | End: 2022-01-22 | Stop reason: SDUPTHER

## 2021-02-04 NOTE — TELEPHONE ENCOUNTER
1 mo supply sent to the local pharmacy  Pt has 2 appointments in the next two weeks at our office  One on 2/11 with Chace Gant for annual and on with me on 2/25 for annual  Please have her choose one and remove the other

## 2021-02-23 ENCOUNTER — ANNUAL EXAM (OUTPATIENT)
Dept: OBGYN CLINIC | Facility: CLINIC | Age: 41
End: 2021-02-23
Payer: COMMERCIAL

## 2021-02-23 VITALS
DIASTOLIC BLOOD PRESSURE: 70 MMHG | SYSTOLIC BLOOD PRESSURE: 118 MMHG | HEIGHT: 65 IN | WEIGHT: 201 LBS | BODY MASS INDEX: 33.49 KG/M2

## 2021-02-23 DIAGNOSIS — Z12.31 ENCOUNTER FOR SCREENING MAMMOGRAM FOR BREAST CANCER: ICD-10-CM

## 2021-02-23 DIAGNOSIS — B37.3 CANDIDA VAGINITIS: ICD-10-CM

## 2021-02-23 DIAGNOSIS — Z30.41 ORAL CONTRACEPTIVE PILL SURVEILLANCE: ICD-10-CM

## 2021-02-23 DIAGNOSIS — Z01.411 ENCOUNTER FOR GYNECOLOGICAL EXAMINATION WITH ABNORMAL FINDING: Primary | ICD-10-CM

## 2021-02-23 LAB
BV WHIFF TEST VAG QL: NEGATIVE
CLUE CELLS SPEC QL WET PREP: NEGATIVE
PH SMN: NORMAL [PH]
SL AMB POCT WET MOUNT: ABNORMAL
T VAGINALIS VAG QL WET PREP: NEGATIVE
YEAST VAG QL WET PREP: ABNORMAL

## 2021-02-23 PROCEDURE — 87210 SMEAR WET MOUNT SALINE/INK: CPT | Performed by: NURSE PRACTITIONER

## 2021-02-23 PROCEDURE — S0612 ANNUAL GYNECOLOGICAL EXAMINA: HCPCS | Performed by: NURSE PRACTITIONER

## 2021-02-23 RX ORDER — FLUCONAZOLE 150 MG/1
150 TABLET ORAL ONCE
Qty: 2 TABLET | Refills: 0 | Status: SHIPPED | OUTPATIENT
Start: 2021-02-23 | End: 2021-02-23

## 2021-02-23 RX ORDER — NORGESTIMATE AND ETHINYL ESTRADIOL 7DAYSX3 LO
1 KIT ORAL DAILY
Qty: 84 TABLET | Refills: 4 | Status: SHIPPED | OUTPATIENT
Start: 2021-02-23 | End: 2021-11-05

## 2021-02-23 NOTE — PROGRESS NOTES
Assessment / Plan    1  Encounter for gynecological examination with abnormal finding  Well woman exam  2020 pap/hpv negative  Repeat      2  Encounter for screening mammogram for breast cancer  Order for baseline provided  - Mammo screening bilateral w 3d & cad; Future    3  Oral contraceptive pill surveillance  Changed to [address]  - norgestimate-ethinyl estradiol (ORTHO TRI-CYCLEN LO) 0 18/0 215/0 25 MG-25 MCG per tablet; Take 1 tablet by mouth daily  Dispense: 84 tablet; Refill: 4    4  Candida vaginitis  rx sent for oral and topical treatment    - POCT wet mount  - fluconazole (DIFLUCAN) 150 mg tablet; Take 1 tablet (150 mg total) by mouth once for 1 dose Re[eat 2nd dose in 3 days if needed  Dispense: 2 tablet; Refill: 0  - triamcinolone (KENALOG) 0 1 % ointment; Apply topically 2 (two) times a day for 7 days  Dispense: 30 g; Refill: 0        Meng      Trixie Adjdwight is a 36 y o  female who presents for her annual gynecologic exam     She is post  10/3/20  Was given order for 2 hr gtt-- she states that she forgot to do this  I reprinted order for her  She is taking micronor however is no longer breast feeding  Would like to switch to a regular OCP  Has been getting headaches and vulvar itching  Last pap: 2020 neg/neg  Last mammogram: none yet  Sexually active:yes    Periods are irregular  Current contraception: oral progesterone-only contraceptive  History of abnormal Pap smear: yes -   Family history of breast,uterine, ovarian or colon cancer: no      Menstrual History:  OB History        3    Para   2    Term   1       1    AB   1    Living   2       SAB        TAB   1    Ectopic        Multiple   0    Live Births   2           Obstetric Comments   Menarche: 12   x 1                  Patient's last menstrual period was 2021         The following portions of the patient's history were reviewed and updated as appropriate: allergies, current medications, past family history, past medical history, past social history, past surgical history and problem list     Review of Systems      Review of Systems   Constitutional: Negative for chills and fever  Respiratory: Negative for cough and shortness of breath  Gastrointestinal: Negative for abdominal distention, abdominal pain, blood in stool, constipation, diarrhea, nausea and vomiting  Genitourinary: Positive for vaginal discharge (with itching)  Negative for difficulty urinating, dysuria, frequency, genital sores, hematuria, menstrual problem, pelvic pain, urgency and vaginal bleeding  Musculoskeletal: Negative for arthralgias and myalgias  Breasts:  Negative for skin changes, dimpling, asymmetry, nipple discharge, redness, tenderness or palpable masses    Objective     Wet mount: + hyphae     /70 (BP Location: Left arm, Patient Position: Sitting, Cuff Size: Standard)   Ht 5' 4 5" (1 638 m)   Wt 91 2 kg (201 lb)   LMP 01/30/2021   BMI 33 97 kg/m²      Physical Exam  Constitutional:       General: She is not in acute distress  Appearance: Normal appearance  She is well-developed  She is obese  She is not ill-appearing or diaphoretic  HENT:      Head: Normocephalic and atraumatic  Eyes:      Pupils: Pupils are equal, round, and reactive to light  Neck:      Musculoskeletal: Neck supple  Thyroid: No thyromegaly  Pulmonary:      Effort: Pulmonary effort is normal    Chest:      Breasts: Breasts are symmetrical          Right: No inverted nipple, mass, nipple discharge, skin change or tenderness  Left: No inverted nipple, mass, nipple discharge, skin change or tenderness  Abdominal:      General: There is no distension  Palpations: Abdomen is soft  There is no mass  Tenderness: There is no abdominal tenderness  There is no guarding or rebound  Genitourinary:     General: Normal vulva  Exam position: Lithotomy position        Labia:         Right: No rash, tenderness, lesion or injury  Left: No rash, tenderness, lesion or injury  Vagina: No signs of injury and foreign body  Vaginal discharge (moderate, odorless) present  No erythema, tenderness or bleeding  Cervix: No cervical motion tenderness, discharge or friability  Uterus: Not enlarged and not tender  Adnexa:         Right: No mass or tenderness  Left: No mass or tenderness  Lymphadenopathy:      Cervical: No cervical adenopathy  Upper Body:      Right upper body: No supraclavicular adenopathy  Left upper body: No supraclavicular adenopathy  Skin:     General: Skin is warm and dry  Neurological:      General: No focal deficit present  Mental Status: She is alert and oriented to person, place, and time  Psychiatric:         Mood and Affect: Mood normal          Behavior: Behavior normal          Thought Content:  Thought content normal          Judgment: Judgment normal

## 2021-03-02 DIAGNOSIS — B37.3 CANDIDA VAGINITIS: ICD-10-CM

## 2021-03-02 NOTE — TELEPHONE ENCOUNTER
Pt called, last visit was  stated that the pharmacy did not give her the triamcinolone (KENALOG) 0 1 % ointment  When she called the pharmacy she was told that the prescription

## 2021-04-13 DIAGNOSIS — Z23 ENCOUNTER FOR IMMUNIZATION: ICD-10-CM

## 2021-05-11 NOTE — PROGRESS NOTES
Assessment/Plan:    Class 1 obesity  -Discussed options of [facility]-Intensive Lifestyle Intervention Program, Very Low Calorie Diet-VLCD and Conservative Program and the role of weight loss medications   -Initial weight loss goal of 5-10% weight loss for improved health  -Screening labs  -Patient is interested in pursuing Very Low Calorie Diet-VLCD   -Samples provided  -Cholestasis during pregnancy- 7 month pp; Reviewed s/sx of cholecystitis and have ER eval if sx occur    Contraindications: no  Labs ordered: yes  HTN meds addressed: n/a  DM2 meds addressed: n/a  VLCD time restriction based on BMI: no  LMP/OCP: yes    Follow up for VLCD  Goals:  Food log (ie ) www myfitnesspal com,sparkpeople  com,loseit com,calorieking  com,etc  baritastic  No sugary beverages  At least 64oz of water daily  80 oz while on VLCD  Recommend checking lab coverage before having labs drawn    Diagnoses and all orders for this visit:    Abnormal weight gain  -     Comprehensive metabolic panel; Future  -     Hemoglobin A1C; Future  -     Insulin, fasting; Future  -     Lipid panel; Future  -     Magnesium; Future  -     TSH, 3rd generation with Free T4 reflex; Future  -     Comprehensive metabolic panel  -     Lipid panel  -     Magnesium  -     TSH, 3rd generation with Free T4 reflex  -     ECG 12 lead; Future    Hyperglycemia  -     Comprehensive metabolic panel; Future  -     Hemoglobin A1C; Future  -     Insulin, fasting; Future  -     Lipid panel; Future  -     Magnesium; Future  -     TSH, 3rd generation with Free T4 reflex; Future  -     Comprehensive metabolic panel  -     Lipid panel  -     Magnesium  -     TSH, 3rd generation with Free T4 reflex  -     ECG 12 lead; Future    Body mass index 34 0-34 9, adult  -     Comprehensive metabolic panel; Future  -     Hemoglobin A1C; Future  -     Insulin, fasting; Future  -     Lipid panel; Future  -     Magnesium; Future  -     TSH, 3rd generation with Free T4 reflex;  Future  - Comprehensive metabolic panel  -     Lipid panel  -     Magnesium  -     TSH, 3rd generation with Free T4 reflex  -     ECG 12 lead; Future        Subjective:   Chief Complaint   Patient presents with    Consult     MWM consult     Patient ID: Adrianna Meehan  is a 36 y o  female with excess weight/obesity here to pursue weight management  Past Medical History:   Diagnosis Date    Abnormal Pap smear of cervix     4/2017-wnl, HPV + (18/45), colpo; 8/2018-ASCUS + HPV: 9/18-colpo squamous atypia; 8/2019: wnl, HPV neg; 4/2020 pap neg/hpv neg    Asthma     Attention disturbance     last assessed: 8/1/2017    BV (bacterial vaginosis)     Cholestasis during pregnancy in third trimester 9/9/2020    Fibroid     4 cm lower uterine segment    Gestational diabetes     Headache     Herpes     episodic rx    HPV (human papilloma virus) infection     Need for prophylactic vaccination against human papillomavirus (HPV) types 6, 11, 16, and 18     declines    Varicella      HPI:  Obesity/Excess Weight:  Severity: Mild  Onset:  At age 40    Modifiers: Diet and Exercise and Commercial Weight Loss Programs-ie  Weight Watchers, [place], Nutrisystem, etc   Contributing factors: Poor Food Choices, Insufficient Physical Activity and Stress/Emotional Eating; Metabolism  Associated symptoms: body image issues, decreased self esteem, depression and clothes do not fit    Breastfeeding: no    Goals: 165  Hydration: water + crystal light; 3 (Lg) cups of coffee w/ honey and creamer   Alcohol: wine a few times per week  Exercise: no  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   [facility]: 0/8    The following portions of the patient's history were reviewed and updated as appropriate: allergies, current medications, past family history, past medical history, past social history, past surgical history and problem list     Review of Systems   Constitutional: Negative for chills and fever     HENT: Negative for sore throat  Respiratory: Positive for cough (attributed to allergy/post nasal drip)  Negative for shortness of breath  Cardiovascular: Negative for chest pain and palpitations  Gastrointestinal: Negative for constipation and diarrhea  Genitourinary: Negative for dysuria  Musculoskeletal: Negative for arthralgias  Skin: Negative for rash  Neurological: Negative for headaches  Psychiatric/Behavioral:        Feeling down with working from home  Denies SI/HI     Objective:    /68 (BP Location: Left arm, Patient Position: Sitting, Cuff Size: Adult)   Pulse 88   Temp (!) 97 3 °F (36 3 °C) (Tympanic)   Resp 16   Ht 5' 4 9" (1 648 m)   Wt 94 4 kg (208 lb 1 6 oz)   BMI 34 74 kg/m²     Physical Exam  Vitals signs and nursing note reviewed  Constitutional   General appearance: Abnormal   well developed and obese  Eyes No conjunctival pallor  Pulmonary   Respiratory effort: No increased work of breathing or signs of respiratory distress  Auscultation of lungs: Clear to auscultation, equal breath sounds bilaterally, no wheezes, no rales, no rhonci  Cardiovascular   Auscultation of heart: Normal rate and rhythm, normal S1 and S2, without murmurs  Examination of extremities for edema and/or varicosities: Normal   no edema  Abdomen   Abdomen: Abnormal   The abdomen was obese  Bowel sounds were normal  The abdomen was soft and nontender     Musculoskeletal   Gait and station: Normal     Skin  Visualized portions without abnormalities/lesions  Psychiatric   Orientation to person, place and time: Normal     Affect: appropriate

## 2021-05-12 ENCOUNTER — OFFICE VISIT (OUTPATIENT)
Dept: BARIATRICS | Facility: CLINIC | Age: 41
End: 2021-05-12
Payer: COMMERCIAL

## 2021-05-12 VITALS
SYSTOLIC BLOOD PRESSURE: 118 MMHG | HEIGHT: 65 IN | RESPIRATION RATE: 16 BRPM | TEMPERATURE: 97.3 F | BODY MASS INDEX: 34.67 KG/M2 | DIASTOLIC BLOOD PRESSURE: 68 MMHG | WEIGHT: 208.1 LBS | HEART RATE: 88 BPM

## 2021-05-12 DIAGNOSIS — R63.5 ABNORMAL WEIGHT GAIN: Primary | ICD-10-CM

## 2021-05-12 DIAGNOSIS — R73.9 HYPERGLYCEMIA: ICD-10-CM

## 2021-05-12 PROCEDURE — 99243 OFF/OP CNSLTJ NEW/EST LOW 30: CPT | Performed by: PHYSICIAN ASSISTANT

## 2021-05-12 PROCEDURE — 1036F TOBACCO NON-USER: CPT | Performed by: PHYSICIAN ASSISTANT

## 2021-05-12 NOTE — PATIENT INSTRUCTIONS
Goals: Food log (ie ) www myfitnesspal com,sparkpeople  com,loseit com,calorieking  com,etc  baritastic  No sugary beverages  At least 64oz of water daily   80 oz while on VLCD  Recommend checking lab coverage before having labs drawn

## 2021-05-12 NOTE — ASSESSMENT & PLAN NOTE
-Discussed options of [facility]-Intensive Lifestyle Intervention Program, Very Low Calorie Diet-VLCD and Conservative Program and the role of weight loss medications   -Initial weight loss goal of 5-10% weight loss for improved health  -Screening labs  -Patient is interested in pursuing Very Low Calorie Diet-VLCD   -Samples provided  -Cholestasis during pregnancy- 7 month pp; Reviewed s/sx of cholecystitis and have ER eval if sx occur    Contraindications: no  Labs ordered: yes  HTN meds addressed: n/a  DM2 meds addressed: n/a  VLCD time restriction based on BMI: no  LMP/OCP: yes

## 2021-05-14 ENCOUNTER — TELEPHONE (OUTPATIENT)
Dept: BARIATRICS | Facility: CLINIC | Age: 41
End: 2021-05-14

## 2021-05-18 ENCOUNTER — TRANSCRIBE ORDERS (OUTPATIENT)
Dept: ADMINISTRATIVE | Facility: HOSPITAL | Age: 41
End: 2021-05-18

## 2021-05-18 ENCOUNTER — LAB (OUTPATIENT)
Dept: LAB | Facility: HOSPITAL | Age: 41
End: 2021-05-18
Attending: PHYSICIAN ASSISTANT
Payer: COMMERCIAL

## 2021-05-18 DIAGNOSIS — R63.5 ABNORMAL WEIGHT GAIN: ICD-10-CM

## 2021-05-18 DIAGNOSIS — R63.5 ABNORMAL WEIGHT GAIN: Primary | ICD-10-CM

## 2021-05-18 DIAGNOSIS — R73.9 HYPERGLYCEMIA: ICD-10-CM

## 2021-05-18 LAB
ALBUMIN SERPL BCP-MCNC: 3.5 G/DL (ref 3.5–5)
ALP SERPL-CCNC: 70 U/L (ref 46–116)
ALT SERPL W P-5'-P-CCNC: 20 U/L (ref 12–78)
ANION GAP SERPL CALCULATED.3IONS-SCNC: 12 MMOL/L (ref 4–13)
AST SERPL W P-5'-P-CCNC: 11 U/L (ref 5–45)
BILIRUB SERPL-MCNC: 0.5 MG/DL (ref 0.2–1)
BUN SERPL-MCNC: 10 MG/DL (ref 5–25)
CALCIUM SERPL-MCNC: 8.6 MG/DL (ref 8.3–10.1)
CHLORIDE SERPL-SCNC: 105 MMOL/L (ref 100–108)
CHOLEST SERPL-MCNC: 218 MG/DL (ref 50–200)
CO2 SERPL-SCNC: 24 MMOL/L (ref 21–32)
CREAT SERPL-MCNC: 0.8 MG/DL (ref 0.6–1.3)
EST. AVERAGE GLUCOSE BLD GHB EST-MCNC: 103 MG/DL
GFR SERPL CREATININE-BSD FRML MDRD: 93 ML/MIN/1.73SQ M
GLUCOSE P FAST SERPL-MCNC: 89 MG/DL (ref 65–99)
HBA1C MFR BLD: 5.2 %
HDLC SERPL-MCNC: 58 MG/DL
INSULIN SERPL-ACNC: 10.5 MU/L (ref 3–25)
LDLC SERPL CALC-MCNC: 121 MG/DL (ref 0–100)
MAGNESIUM SERPL-MCNC: 1.7 MG/DL (ref 1.6–2.6)
NONHDLC SERPL-MCNC: 160 MG/DL
POTASSIUM SERPL-SCNC: 4.6 MMOL/L (ref 3.5–5.3)
PROT SERPL-MCNC: 7.4 G/DL (ref 6.4–8.2)
SODIUM SERPL-SCNC: 141 MMOL/L (ref 136–145)
TRIGL SERPL-MCNC: 194 MG/DL
TSH SERPL DL<=0.05 MIU/L-ACNC: 1.87 UIU/ML (ref 0.36–3.74)

## 2021-05-18 PROCEDURE — 83735 ASSAY OF MAGNESIUM: CPT

## 2021-05-18 PROCEDURE — 36415 COLL VENOUS BLD VENIPUNCTURE: CPT

## 2021-05-18 PROCEDURE — 84443 ASSAY THYROID STIM HORMONE: CPT

## 2021-05-18 PROCEDURE — 93005 ELECTROCARDIOGRAM TRACING: CPT

## 2021-05-18 PROCEDURE — 83036 HEMOGLOBIN GLYCOSYLATED A1C: CPT

## 2021-05-18 PROCEDURE — 83525 ASSAY OF INSULIN: CPT

## 2021-05-18 PROCEDURE — 80061 LIPID PANEL: CPT

## 2021-05-18 PROCEDURE — 80053 COMPREHEN METABOLIC PANEL: CPT

## 2021-05-19 ENCOUNTER — TELEPHONE (OUTPATIENT)
Dept: BARIATRICS | Facility: CLINIC | Age: 41
End: 2021-05-19

## 2021-05-19 LAB
ATRIAL RATE: 60 BPM
P AXIS: -27 DEGREES
PR INTERVAL: 130 MS
QRS AXIS: 36 DEGREES
QRSD INTERVAL: 78 MS
QT INTERVAL: 410 MS
QTC INTERVAL: 410 MS
T WAVE AXIS: 31 DEGREES
VENTRICULAR RATE: 60 BPM

## 2021-05-19 PROCEDURE — 93010 ELECTROCARDIOGRAM REPORT: CPT | Performed by: INTERNAL MEDICINE

## 2021-05-25 NOTE — PROGRESS NOTES
Initial RD session for VLCD completed  Components of diet discussed including ketosis, hydration, possible side effects  2 weeks of product ordered and received  Will f/u 6/4 via email

## 2021-05-26 ENCOUNTER — TELEPHONE (OUTPATIENT)
Dept: OBGYN CLINIC | Facility: CLINIC | Age: 41
End: 2021-05-26

## 2021-05-26 DIAGNOSIS — Z30.41 ORAL CONTRACEPTIVE PILL SURVEILLANCE: ICD-10-CM

## 2021-05-26 NOTE — TELEPHONE ENCOUNTER
Pt lvm on nurse line requesting a refill of her birth control be sent to her pharmacy on file  Pt would like a call back from someone in the office once it is done  I will be out of the office until next week

## 2021-05-26 NOTE — TELEPHONE ENCOUNTER
I prescribed a 90 day supply with four refills at her yearly visit in 2/2021  This should carry her through to her next yearly in 2/2022  Please inform patient

## 2021-06-01 ENCOUNTER — OFFICE VISIT (OUTPATIENT)
Dept: BARIATRICS | Facility: CLINIC | Age: 41
End: 2021-06-01

## 2021-06-01 VITALS — WEIGHT: 206.8 LBS | BODY MASS INDEX: 34.45 KG/M2 | HEIGHT: 65 IN

## 2021-06-01 DIAGNOSIS — R63.5 ABNORMAL WEIGHT GAIN: ICD-10-CM

## 2021-06-01 PROCEDURE — VLCD

## 2021-06-01 PROCEDURE — RECHECK

## 2021-06-04 ENCOUNTER — OFFICE VISIT (OUTPATIENT)
Dept: BARIATRICS | Facility: CLINIC | Age: 41
End: 2021-06-04

## 2021-06-04 DIAGNOSIS — R63.5 ABNORMAL WEIGHT GAIN: Primary | ICD-10-CM

## 2021-06-04 PROCEDURE — RECHECK

## 2021-06-04 NOTE — PROGRESS NOTES
Contacted patient to follow-up on tolerance of VLCD Diet  Patient states they are doing well, slight headache noted  Tolerating meal replacements without difficulty  Consuming at least 80oz of water in addition to water used to mix replacements  Will follow up in ~2 weeks

## 2021-06-15 NOTE — PROGRESS NOTES
Weight Management Medical Nutrition Assessment   Is here for VLCD f/u  Current wt: 198 9 lbs  Loss of 7 9 lbs x 2 weeks  She does not wish to stay on VLCD  Meal plan developed for partial replacement  Other resources provided  Options for f/u discussed  She will f/u in 2 wks  Patient seen by Medical Provider in past 6 months:  yes  Requested to schedule appointment with Medical Provider: No    Anthropometric Measurements  Start Weight (#):  206 8 lbs 6/1/21  Current Weight (#): 198 9 lbs  TBW % Change from start weight: 3 8%  Ideal Body Weight (#): 125 lbs  Goal Weight (#): 165 lbs    Weight Loss History  Previous weight loss attempts: Commercial Programs ([employer], [employer], etc )    Food and Nutrition Related History  Wake up: 6   Bed Time: 9:30    Food Recall  Breakfast: 8:30 replacement   Snack:11:00 bar  Lunch: 1:00 replacement  Snack: skip  Dinner: 6:00 replacement  Snack: skip     Beverages: water and coffee/tea  Volume of beverage intake: 80 oz    Weekends: Same  Cravings: varies  Trouble area of day: midmorning    Frequency of Eating out: none currently  Food restrictions: n/a  Cooking: self   Food Shopping: self    Physical Activity Intake  Activity:none currently  Frequency:n/a  Physical limitations/barriers to exercise: n/a    Estimated Needs  Energy  Bear Julio C Energy Needs: BMR :  4665  1-2# loss weekly sedentary: 641-6373             1-2# loss weekly lightly active: 5010-2067  Maintenance calories for sedentary activity level: 1888  Protein: 68-85 gm     (1 2-1 5g/kg IBW)  Fluid: 66 oz    (35mL/kg IBW)    Nutrition Diagnosis  Yes; Overweight/obesity  related to Excess energy intake as evidenced by  BMI more than normative standard for age and sex (obesity-grade I 26-30  9)       Nutrition Intervention    Nutrition Prescription  Calories:9514-9715  Protein: 100-120 gm  Carb:  gm    Meal Plan (David/Pro/Carb)  Breakfast: 200, 27, 10  Snack: 160, 15, 18  Lunch: 200, 27, 10  Snack: 100-150, 5-10, 10-20  Dinner: 350-450, 28, 25-30  Snack: 0-150, 0-10, 0-20    Nutrition Education:    Calorie controlled menu  Lean protein food choices  Healthy snack options  Food journaling tips      Nutrition Counseling:  Strategies: meal planning, portion sizes, healthy snack choices, hydration, fiber intake, protein intake, exercise, food journal      Monitoring and Evaluation:  Evaluation criteria:  Energy Intake  Meet protein needs  Maintain adequate hydration  Monitor weekly weight  Meal planning/preparation  Food journal   Decreased portions at mealtimes and snacks  Physical activity     Barriers to learning:none  Readiness to change: Action:  (Changing behavior)  Comprehension: good  Expected Compliance: good

## 2021-06-16 ENCOUNTER — OFFICE VISIT (OUTPATIENT)
Dept: BARIATRICS | Facility: CLINIC | Age: 41
End: 2021-06-16

## 2021-06-16 VITALS — WEIGHT: 198.9 LBS | BODY MASS INDEX: 33.14 KG/M2 | HEIGHT: 65 IN

## 2021-06-16 DIAGNOSIS — R63.5 ABNORMAL WEIGHT GAIN: ICD-10-CM

## 2021-06-16 PROCEDURE — VLCD

## 2021-06-16 PROCEDURE — RECHECK

## 2021-06-28 NOTE — PROGRESS NOTES
Weight Management Medical Nutrition Assessment (PERLA)  Pt was on VLCD at [facility], then transitioned to partial program  She is interested in RD BUNDLE 1/3  Current wt: 195# lbs  Loss of 3 9# x 2 weeks and lost 11 8# overall  She continues to follow partial program and overall feeling well  She feels frustrated because she weighs herself daily and throughout the day  Explained that she should not be weighing herself daily that weight is always changing, only weigh herself 1-2x/wk  Per dietary recall, needs to make sure she is eating her snacks and not eating too much at dinner time  She just got her food scale to be more accurate  She doesn't use a food log or tracker, but follows meal plan well  She has been going to the gym 3x/wk and doing mainly cardio  Continue to encourage 0555-7063 kcal/day and weighing/measuring  Informed patient about product sale  Will return in 6 weeks  Name was verified by patient stating name? Yes   verified by patient stating? Yes  Verified the patient is alone? Yes  Offered patient a live visit but are now consenting to this telephone visit? Yes  Verified with the patient this visit will go towards their pre paid charges?  Yes      Patient seen by Medical Provider in past 6 months:  yes  Requested to schedule appointment with Medical Provider: No     Anthropometric Measurements  Start Weight (#):  206 8 lbs (21)  Current Weight (#): 195#  TBW % Change from start weight: 5 7%  Ideal Body Weight (#): 125 lbs  Goal Weight (#): 165#     Weight Loss History  Previous weight loss attempts: Commercial Programs (Weight Watchers, [place], etc )     Food and Nutrition Related History  Wake up: 6 AM       Bed Time: 9:30 PM     Food Recall  Breakfast: (10AM): meal replacement    Snack: (12PM): protein bar  Lunch: (2:30-3PM): meal replacement  Snack: (4-5PM): string cheese -- suggest adding something there  Dinner: (6:30-7PM): chicken breast + small baked potato; salad + chicken + ranch  Snack: ---     Beverages: water and coffee/tea  Volume of beverage intake: 6 water bottles      Weekends: sometimes not eating enough or drinking enough water  Cravings: chocolate  Trouble area of day: mid afternoon     Frequency of Eating out: none  Food restrictions: none  Cooking: self   Food Shopping: self     Physical Activity Intake  Activity: Gym -  (treadmill - walk 1 mile at incline); weight lifting  Frequency: 3x/wk  Physical limitations/barriers to exercise: n/a     Estimated Needs  Energy  Bear Julio C Energy Needs: BMR :  1555 kcal   1-2# loss weekly sedentary: 866-1366 kcal          1-2# loss weekly lightly active: 1942-7390 kcal  Maintenance calories for sedentary activity level: 1866 kcal  Protein: 68-85 gm     (1 2-1 5g/kg IBW)  Fluid: 66 oz    (35mL/kg IBW)     Nutrition Diagnosis  Yes; Overweight/obesity  related to Excess energy intake as evidenced by  BMI more than normative standard for age and sex (obesity-grade I 26-30  9)     Nutrition Intervention     Nutrition Prescription  PARTIAL PROGRAM  Calories:3236-1339  Protein: 100-120 gm  Carb:  gm     Meal Plan (David/Pro/Carb)  Breakfast: 200, 27, 10  Snack: 160, 15, 18  Lunch: 200, 27, 10  Snack: 100-150, 5-10, 10-20  Dinner: 350-450, 28, 25-30  Snack: 0-150, 0-10, 0-20     Nutrition Education:    Calorie controlled menu  Lean protein food choices  Healthy snack options  Food journaling tips        Nutrition Counseling:  Strategies: meal planning, portion sizes, healthy snack choices, hydration, fiber intake, protein intake, exercise, food journal        Monitoring and Evaluation:  Evaluation criteria:  Energy Intake  Meet protein needs  Maintain adequate hydration  Monitor weekly weight  Meal planning/preparation  Food journal   Decreased portions at mealtimes and snacks  Physical activity      Barriers to learning:none  Readiness to change: Action:  (Changing behavior)  Comprehension: good  Expected Compliance: good

## 2021-06-30 ENCOUNTER — OFFICE VISIT (OUTPATIENT)
Dept: BARIATRICS | Facility: CLINIC | Age: 41
End: 2021-06-30

## 2021-06-30 VITALS — WEIGHT: 195 LBS | BODY MASS INDEX: 32.49 KG/M2 | HEIGHT: 65 IN

## 2021-06-30 DIAGNOSIS — R63.5 ABNORMAL WEIGHT GAIN: Primary | ICD-10-CM

## 2021-06-30 PROCEDURE — RECHECK

## 2021-06-30 PROCEDURE — 3008F BODY MASS INDEX DOCD: CPT | Performed by: PHYSICIAN ASSISTANT

## 2021-06-30 PROCEDURE — DB3PK

## 2021-08-03 NOTE — PROGRESS NOTES
Weight Management Medical Nutrition Assessment   Arabella Emerson is here for 2/3 bundle  Current wt: 195 2# lbs  She is stable over the last ~5-6 wks with overall loss of 11 6 lbs x 9 wks  Seca completed and results reviewed  Frustrated with lack of loss however lacking consistency  Not tracking or measuring, often low in protein, long stretches between meals  Importance of calories to fuel the body discussed  Suggestions provided for dinner so that she does not skip this meal  Does continue at the gym 3x/wk  She will f/u in October       Patient seen by Medical Provider in past 6 months:  yes  Requested to schedule appointment with Medical Provider: No     Anthropometric Measurements  Start Weight (#):  206 8 lbs (6/1/21)  Current Weight (#): 195 2#  TBW % Change from start weight: 5 7%  Ideal Body Weight (#): 125 lbs  Goal Weight (#): 165#     Weight Loss History  Previous weight loss attempts: Commercial Programs ([employer], [place], etc )     Food and Nutrition Related History  Wake up: 6 AM       Bed Time: 9:30 PM     Food Recall  Breakfast: (9-10): meal replacement    Snack: (skip):skip  Lunch: (2-3): salad w/shredded cheese, sometimes chicken, fat free dressing  Snack: (4-5PM): string cheese OR 1/2 banana  tigre fiber one bar  Dinner: (6:30-7PM): skip 2-3x/wk OR salad OR grilled chicken, broccoli  Snack:  skip OR greek yogurt OR fiber one      Beverages: water and coffee/tea  Volume of beverage intake: 6 water bottles      Weekends: sometimes not eating enough or drinking enough water  Cravings: chocolate  Trouble area of day: mid afternoon     Frequency of Eating out: none  Food restrictions: none  Cooking: self   Food Shopping: self     Physical Activity Intake  Activity: Gym -  (treadmill, biking), weights  Frequency: 3x/wk  Physical limitations/barriers to exercise: n/a     Estimated Needs  Energy  Bear Julio C Energy Needs:  BMR :  1555 kcal   1-2# loss weekly sedentary: 866-1366 kcal 1-2# loss weekly lightly active: 8044-9952 kcal  Maintenance calories for sedentary activity level: 1866 kcal  Protein: 68-85 gm     (1 2-1 5g/kg IBW)  Fluid: 66 oz    (35mL/kg IBW)     Nutrition Diagnosis  Yes; Overweight/obesity  related to Excess energy intake as evidenced by  BMI more than normative standard for age and sex (obesity-grade I 26-30  9)     Nutrition Intervention     Nutrition Prescription  PARTIAL PROGRAM  Calories:2599-1819  Protein: 100-120 gm  Carb:  gm     Meal Plan (David/Pro/Carb)  Breakfast: 200, 27, 10  Snack: 160, 15, 18  Lunch: 200, 27, 10  Snack: 100-150, 5-10, 10-20  Dinner: 350-450, 28, 25-30  Snack: 0-150, 0-10, 0-20     Nutrition Education:    Calorie controlled menu  Lean protein food choices  Healthy snack options  Food journaling tips        Nutrition Counseling:  Strategies: meal planning, portion sizes, healthy snack choices, hydration, fiber intake, protein intake, exercise, food journal        Monitoring and Evaluation:  Evaluation criteria:  Energy Intake  Meet protein needs  Maintain adequate hydration  Monitor weekly weight  Meal planning/preparation  Food journal   Decreased portions at mealtimes and snacks  Physical activity      Barriers to learning:none  Readiness to change: Action:  (Changing behavior)  Comprehension: good  Expected Compliance: good

## 2021-08-11 ENCOUNTER — OFFICE VISIT (OUTPATIENT)
Dept: BARIATRICS | Facility: CLINIC | Age: 41
End: 2021-08-11

## 2021-08-11 VITALS — WEIGHT: 195.22 LBS | BODY MASS INDEX: 32.53 KG/M2 | HEIGHT: 65 IN

## 2021-08-11 DIAGNOSIS — R63.5 ABNORMAL WEIGHT GAIN: Primary | ICD-10-CM

## 2021-08-11 PROCEDURE — RECHECK

## 2021-08-19 DIAGNOSIS — Z30.41 ORAL CONTRACEPTIVE PILL SURVEILLANCE: ICD-10-CM

## 2021-08-19 RX ORDER — NORGESTIMATE AND ETHINYL ESTRADIOL 7DAYSX3 LO
1 KIT ORAL DAILY
Qty: 84 TABLET | Refills: 0 | Status: CANCELLED | OUTPATIENT
Start: 2021-08-19

## 2021-08-27 DIAGNOSIS — J01.90 ACUTE NON-RECURRENT SINUSITIS, UNSPECIFIED LOCATION: ICD-10-CM

## 2021-08-27 RX ORDER — ALBUTEROL SULFATE 90 UG/1
2 AEROSOL, METERED RESPIRATORY (INHALATION) EVERY 6 HOURS PRN
Qty: 18 G | Refills: 2 | Status: SHIPPED | OUTPATIENT
Start: 2021-08-27

## 2021-11-05 DIAGNOSIS — Z30.41 ORAL CONTRACEPTIVE PILL SURVEILLANCE: Primary | ICD-10-CM

## 2021-11-05 RX ORDER — ACETAMINOPHEN AND CODEINE PHOSPHATE 120; 12 MG/5ML; MG/5ML
1 SOLUTION ORAL DAILY
Qty: 84 TABLET | Refills: 1 | Status: SHIPPED | OUTPATIENT
Start: 2021-11-05 | End: 2022-02-24 | Stop reason: SDUPTHER

## 2022-01-19 ENCOUNTER — OFFICE VISIT (OUTPATIENT)
Dept: FAMILY MEDICINE CLINIC | Facility: CLINIC | Age: 42
End: 2022-01-19
Payer: COMMERCIAL

## 2022-01-19 ENCOUNTER — APPOINTMENT (OUTPATIENT)
Dept: LAB | Facility: CLINIC | Age: 42
End: 2022-01-19
Payer: COMMERCIAL

## 2022-01-19 VITALS
HEART RATE: 68 BPM | WEIGHT: 204.13 LBS | SYSTOLIC BLOOD PRESSURE: 110 MMHG | BODY MASS INDEX: 34.01 KG/M2 | TEMPERATURE: 98.4 F | HEIGHT: 65 IN | OXYGEN SATURATION: 97 % | DIASTOLIC BLOOD PRESSURE: 80 MMHG | RESPIRATION RATE: 18 BRPM

## 2022-01-19 DIAGNOSIS — Z00.00 PHYSICAL EXAM: Primary | ICD-10-CM

## 2022-01-19 DIAGNOSIS — F41.1 GENERALIZED ANXIETY DISORDER: ICD-10-CM

## 2022-01-19 DIAGNOSIS — R07.89 CHEST TIGHTNESS: ICD-10-CM

## 2022-01-19 DIAGNOSIS — R06.02 SHORTNESS OF BREATH: ICD-10-CM

## 2022-01-19 DIAGNOSIS — R06.00 DYSPNEA, UNSPECIFIED TYPE: ICD-10-CM

## 2022-01-19 DIAGNOSIS — R07.9 CHEST PAIN, UNSPECIFIED TYPE: Primary | ICD-10-CM

## 2022-01-19 LAB
ALBUMIN SERPL BCP-MCNC: 4 G/DL (ref 3.5–5)
ALP SERPL-CCNC: 66 U/L (ref 46–116)
ALT SERPL W P-5'-P-CCNC: 20 U/L (ref 12–78)
ANION GAP SERPL CALCULATED.3IONS-SCNC: 5 MMOL/L (ref 4–13)
AST SERPL W P-5'-P-CCNC: 15 U/L (ref 5–45)
BILIRUB SERPL-MCNC: 0.79 MG/DL (ref 0.2–1)
BUN SERPL-MCNC: 9 MG/DL (ref 5–25)
CALCIUM SERPL-MCNC: 9.4 MG/DL (ref 8.3–10.1)
CHLORIDE SERPL-SCNC: 104 MMOL/L (ref 100–108)
CHOLEST SERPL-MCNC: 207 MG/DL
CO2 SERPL-SCNC: 28 MMOL/L (ref 21–32)
CREAT SERPL-MCNC: 0.68 MG/DL (ref 0.6–1.3)
ERYTHROCYTE [DISTWIDTH] IN BLOOD BY AUTOMATED COUNT: 11.7 % (ref 11.6–15.1)
GFR SERPL CREATININE-BSD FRML MDRD: 108 ML/MIN/1.73SQ M
GLUCOSE P FAST SERPL-MCNC: 79 MG/DL (ref 65–99)
HCT VFR BLD AUTO: 42.5 % (ref 34.8–46.1)
HDLC SERPL-MCNC: 51 MG/DL
HGB BLD-MCNC: 14.2 G/DL (ref 11.5–15.4)
LDLC SERPL CALC-MCNC: 131 MG/DL (ref 0–100)
MCH RBC QN AUTO: 31.4 PG (ref 26.8–34.3)
MCHC RBC AUTO-ENTMCNC: 33.4 G/DL (ref 31.4–37.4)
MCV RBC AUTO: 94 FL (ref 82–98)
NONHDLC SERPL-MCNC: 156 MG/DL
PLATELET # BLD AUTO: 266 THOUSANDS/UL (ref 149–390)
PMV BLD AUTO: 10.3 FL (ref 8.9–12.7)
POTASSIUM SERPL-SCNC: 4 MMOL/L (ref 3.5–5.3)
PROT SERPL-MCNC: 7.6 G/DL (ref 6.4–8.2)
RBC # BLD AUTO: 4.52 MILLION/UL (ref 3.81–5.12)
SODIUM SERPL-SCNC: 137 MMOL/L (ref 136–145)
TRIGL SERPL-MCNC: 124 MG/DL
TSH SERPL DL<=0.05 MIU/L-ACNC: 1.25 UIU/ML (ref 0.36–3.74)
WBC # BLD AUTO: 7.08 THOUSAND/UL (ref 4.31–10.16)

## 2022-01-19 PROCEDURE — 3008F BODY MASS INDEX DOCD: CPT | Performed by: NURSE PRACTITIONER

## 2022-01-19 PROCEDURE — 1036F TOBACCO NON-USER: CPT | Performed by: NURSE PRACTITIONER

## 2022-01-19 PROCEDURE — 80053 COMPREHEN METABOLIC PANEL: CPT

## 2022-01-19 PROCEDURE — 84443 ASSAY THYROID STIM HORMONE: CPT

## 2022-01-19 PROCEDURE — 3725F SCREEN DEPRESSION PERFORMED: CPT | Performed by: NURSE PRACTITIONER

## 2022-01-19 PROCEDURE — 85027 COMPLETE CBC AUTOMATED: CPT

## 2022-01-19 PROCEDURE — 99396 PREV VISIT EST AGE 40-64: CPT | Performed by: NURSE PRACTITIONER

## 2022-01-19 PROCEDURE — 80061 LIPID PANEL: CPT

## 2022-01-19 PROCEDURE — 36415 COLL VENOUS BLD VENIPUNCTURE: CPT

## 2022-01-19 NOTE — PROGRESS NOTES
FAMILY PRACTICE OFFICE VISIT       NAME: Lydia Hendrix  AGE: 39 y o  SEX: female       : 1980        MRN: [id number]    Assessment and Plan   1  Physical exam    2  Chest tightness  -     Complete PFT with post bronchodilator; Future  -     CBC; Future  -     Comprehensive metabolic panel; Future  -     Lipid panel; Future  -     TSH, 3rd generation; Future  -     Stress test only, exercise; Future; Expected date: 2022  -     fluticasone-salmeterol (Katt Bushy) 250-50 mcg/dose inhaler; Inhale 1 puff 2 (two) times a day Rinse mouth after use  3  Shortness of breath  -     CBC; Future  -     Comprehensive metabolic panel; Future  -     Lipid panel; Future  -     TSH, 3rd generation; Future  -     Stress test only, exercise; Future; Expected date: 2022  -     fluticasone-salmeterol (Katt Bushy) 250-50 mcg/dose inhaler; Inhale 1 puff 2 (two) times a day Rinse mouth after use  4  Generalized anxiety disorder  -     CBC; Future  -     Comprehensive metabolic panel; Future  -     Lipid panel; Future  -     TSH, 3rd generation; Future       Healthy-appearing 35-year-old female presenting today for annual physical exam   Up-to-date on annual gynecological exam     Aware she is due for a mammogram   She has a prescription for this  COVID vaccination primary series is up-to-date  Annual flu vaccination up to date per patient report  Recently experiencing shortness of breath, chest tightness, chest pain  In office EKG shows normal sinus rhythm, no ischemic changes  Unchanged since 2017  Unclear etiology, questionable asthma, anxiety, cardiac origin  Will complete exercise stress test   Will complete pulmonary function study  In the meantime will start Hinton Sabins as a maintenance inhaler, and continue to use albuterol as needed  Will check blood work as noted  Office will contact her with results of testing when available      She will call or go the emergency room for any worsening of symptoms  Chief Complaint     Chief Complaint   Patient presents with    Well Check       History of Present Illness     Susan Rodriguez     Feeling well overall  Baby girl 3year old at home  Pinching chest pain mid sternal    Sometimes chest feels tight, like she can't take a deep breath in  Does not necessarily come with pain  Short of breath easily with activity  Feels heart rate is racing with activity  Never had COVID-19 virus  Vaccinated for COVID-19 in April 2021  Symptoms come and go  Always scared something is wrong  Trouble sleeping at night, worrying about this  She thinks she may be wheezing at times  Anxiety started because of these symptoms  Was doing kick boxing classes and yoga up through December  Not associated with chest pain or chest tightness  No abdominal pain, heartburn, globus sensation  Albuterol inhaler has not used it for this  Recently has been using this for cough, a few times  Itchy throat, associated with allergies  Had been a long time since using this before recently  Scared to go back to gym due to these symptoms  Never smoked  Feels she did have flu vaccine this year  Mammogram due has prescription  Review of Systems   Review of Systems   Constitutional: Negative  HENT: Negative  Respiratory: Positive for cough, chest tightness, shortness of breath and wheezing  Cardiovascular: Positive for chest pain  Negative for palpitations and leg swelling  Gastrointestinal: Negative  Genitourinary: Negative  Musculoskeletal: Negative  Skin: Negative  Neurological: Negative  Hematological: Negative  Psychiatric/Behavioral: Negative  I have reviewed the patient's medical history in detail; there are no changes to the history as noted in the electronic medical record      Objective     Vitals:    01/19/22 0943   BP: 110/80   Pulse: 68   Resp: 18   Temp: 98 4 °F (36 9 °C)   SpO2: 97%   Weight: 92 6 kg (204 lb 2 oz)   Height: 5' 5" (1 651 m)     Wt Readings from Last 3 Encounters:   01/19/22 92 6 kg (204 lb 2 oz)   08/11/21 88 6 kg (195 lb 3 5 oz)   06/30/21 88 5 kg (195 lb)     Physical Exam  Vitals and nursing note reviewed  Constitutional:       General: She is not in acute distress  Appearance: Normal appearance  She is well-developed  She is not ill-appearing  HENT:      Head: Normocephalic and atraumatic  Eyes:      Conjunctiva/sclera: Conjunctivae normal       Pupils: Pupils are equal, round, and reactive to light  Neck:      Thyroid: No thyromegaly  Cardiovascular:      Rate and Rhythm: Normal rate and regular rhythm  Heart sounds: No murmur heard  Pulmonary:      Effort: Pulmonary effort is normal  No respiratory distress  Breath sounds: Normal breath sounds  No wheezing or rales  Abdominal:      General: Bowel sounds are normal       Palpations: Abdomen is soft  Tenderness: There is no abdominal tenderness  Musculoskeletal:      Cervical back: Normal range of motion and neck supple  Right lower leg: No edema  Left lower leg: No edema  Lymphadenopathy:      Cervical: No cervical adenopathy  Skin:     Findings: No rash  Neurological:      Mental Status: She is alert and oriented to person, place, and time  Psychiatric:         Mood and Affect: Mood normal          Depression Screening and Follow-up Plan: Patient was screened for depression during today's encounter  They screened negative with a PHQ-2 score of 0  ALLERGIES:  Allergies   Allergen Reactions    Penicillins Itching    Amoxicillin Rash     Category:  Allergy;        Current Medications     Current Outpatient Medications   Medication Sig Dispense Refill    acetaminophen (TYLENOL) 325 mg tablet Take 2 tablets (650 mg total) by mouth every 6 (six) hours as needed for mild pain or headaches 30 tablet 0    albuterol (Proventil HFA) 90 mcg/act inhaler Inhale 2 puffs every 6 (six) hours as needed (cough, chest tightness, shortness of breath) 18 g 2    norethindrone (Ortho Micronor) 0 35 MG tablet Take 1 tablet (0 35 mg total) by mouth daily 84 tablet 1    triamcinolone (KENALOG) 0 1 % ointment APPLY TO AFFECTED AREA TWICE A DAY FOR 7 DAYS (Patient taking differently: Apply 1 application topically as needed ) 30 g 0    fluticasone-salmeterol (Wixela Inhub) 250-50 mcg/dose inhaler Inhale 1 puff 2 (two) times a day Rinse mouth after use  180 blister 3    valACYclovir (VALTREX) 1,000 mg tablet Take 1 tablet (1,000 mg total) by mouth daily 90 tablet 3     No current facility-administered medications for this visit           Health Maintenance     Health Maintenance   Topic Date Due    Hepatitis C Screening  Never done    COVID-19 Vaccine (1) Never done    Pneumococcal Vaccine: Pediatrics (0 to 5 Years) and At-Risk Patients (6 to 59 Years) (1 of 2 - PPSV23) Never done    Annual Physical  Never done    Breast Cancer Screening: Mammogram  Never done    Influenza Vaccine (1) 09/01/2021    BMI: Followup Plan  08/11/2022    Depression Screening  01/19/2023    BMI: Adult  01/19/2023    Cervical Cancer Screening  04/20/2025    DTaP,Tdap,and Td Vaccines (2 - Td or Tdap) 09/03/2030    HIV Screening  Completed    HIB Vaccine  Aged Out    Hepatitis B Vaccine  Aged Out    IPV Vaccine  Aged Out    Hepatitis A Vaccine  Aged Out    Meningococcal ACWY Vaccine  Aged Out    HPV Vaccine  Aged Out     Immunization History   Administered Date(s) Administered    Influenza, injectable, quadrivalent, preservative free 0 5 mL 04/20/2020, 11/12/2020    Rho (D) Immune Globulin 03/10/2020, 08/11/2020, 10/03/2020    Tdap 09/03/2020       QUE Avelar

## 2022-01-20 ENCOUNTER — TELEPHONE (OUTPATIENT)
Dept: FAMILY MEDICINE CLINIC | Facility: CLINIC | Age: 42
End: 2022-01-20

## 2022-01-20 NOTE — TELEPHONE ENCOUNTER
----- Message from [id number] sent at 1/20/2022 12:51 PM EST -----  Please let patient know blood work shows cholesterol is mildly high  All other blood work is good

## 2022-01-27 ENCOUNTER — NURSE TRIAGE (OUTPATIENT)
Dept: OTHER | Facility: OTHER | Age: 42
End: 2022-01-27

## 2022-01-27 DIAGNOSIS — Z20.822 ENCOUNTER FOR SCREENING LABORATORY TESTING FOR COVID-19 VIRUS: Primary | ICD-10-CM

## 2022-01-27 PROCEDURE — U0003 INFECTIOUS AGENT DETECTION BY NUCLEIC ACID (DNA OR RNA); SEVERE ACUTE RESPIRATORY SYNDROME CORONAVIRUS 2 (SARS-COV-2) (CORONAVIRUS DISEASE [COVID-19]), AMPLIFIED PROBE TECHNIQUE, MAKING USE OF HIGH THROUGHPUT TECHNOLOGIES AS DESCRIBED BY CMS-2020-01-R: HCPCS | Performed by: FAMILY MEDICINE

## 2022-01-27 PROCEDURE — U0005 INFEC AGEN DETEC AMPLI PROBE: HCPCS | Performed by: FAMILY MEDICINE

## 2022-01-27 NOTE — TELEPHONE ENCOUNTER
Regarding: [facility]-Pt is VIJAY Babcock Shank is Positive-Pt has Questions  ----- Message from Lucie Crawford sent at 1/27/2022  7:47 AM EST -----  " My son is COVID positive and my employer is requiring a note, do you know what is required " clear

## 2022-01-27 NOTE — TELEPHONE ENCOUNTER
Patient is requesting a covid test and does have a [facility]'s PCP  Order placed  Patient informed of [facility] testing site and was advised of hours of operation, address, to wear a mask, and to stay in the car  Patient verbalized understanding  Patient already has a My Chart account to check for results  Advised patient to begin checking in 2-3 days after testing is completed  Reason for Disposition   [1] COVID-19 infection suspected by caller or triager AND [2] mild symptoms (cough, fever, or others) AND [3] has not gotten tested yet    Answer Assessment - Initial Assessment Questions  Were you within 6 feet or less, for up to 15 minutes or more with a person that has a confirmed COVID-19 test? Yes, patient's sone    What was the date of your exposure? 1/26/22 tested positive    Are you experiencing any symptoms attributed to the virus?  (Assess for SOB, cough, fever, difficulty breathing) headaches, body aches,     HIGH RISK: Do you have any history heart or lung conditions, weakened immune system, diabetes, Asthma, CHF, HIV, COPD, Chemo, renal failure, sickle cell, etc? Asthma with inhaler BID past two weeks    PREGNANCY: Are you pregnant or did you recently give birth? Denies    VACCINE: "Have you gotten the COVID-19 vaccine?" If Yes ask: "Which one, how many shots, when did you get it?" Yes, Pfizer x 2 shots    Answer Assessment - Initial Assessment Questions  Were you within 6 feet or less, for up to 15 minutes or more with a person that has a confirmed COVID-19 test? Yes, patient's sone    What was the date of your exposure?  1/26/22 tested positive    Are you experiencing any symptoms attributed to the virus?  (Assess for SOB, cough, fever, difficulty breathing) headaches, body aches,     HIGH RISK: Do you have any history heart or lung conditions, weakened immune system, diabetes, Asthma, CHF, HIV, COPD, Chemo, renal failure, sickle cell, etc? Asthma with inhaler BID past two weeks    PREGNANCY: Are you pregnant or did you recently give birth?  Denies    VACCINE: "Have you gotten the COVID-19 vaccine?" If Yes ask: "Which one, how many shots, when did you get it?" Yes, Pfizer x 2 shots    Protocols used: CORONAVIRUS (COVID-19) DIAGNOSED OR SUSPECTED-ADULT-[place], CORONAVIRUS (COVID-19) EXPOSURE-ADULT-[place]

## 2022-01-28 ENCOUNTER — TELEPHONE (OUTPATIENT)
Dept: FAMILY MEDICINE CLINIC | Facility: CLINIC | Age: 42
End: 2022-01-28

## 2022-01-28 DIAGNOSIS — Z11.52 ENCOUNTER FOR SCREENING FOR COVID-19: Primary | ICD-10-CM

## 2022-01-28 NOTE — TELEPHONE ENCOUNTER
----- Message from Jesus Jaramillo MD sent at 0/26/5427  3:36 PM EST -----  Recent COVID test was negative

## 2022-01-31 ENCOUNTER — TELEPHONE (OUTPATIENT)
Dept: FAMILY MEDICINE CLINIC | Facility: CLINIC | Age: 42
End: 2022-01-31

## 2022-01-31 NOTE — TELEPHONE ENCOUNTER
Patient called, stated that her job is requiring a work note that she is negative for covid, can we write this note?

## 2022-02-02 ENCOUNTER — HOSPITAL ENCOUNTER (OUTPATIENT)
Dept: PULMONOLOGY | Facility: HOSPITAL | Age: 42
Discharge: HOME | End: 2022-02-02
Payer: COMMERCIAL

## 2022-02-02 DIAGNOSIS — R07.89 CHEST TIGHTNESS: ICD-10-CM

## 2022-02-02 PROCEDURE — 94729 DIFFUSING CAPACITY: CPT | Performed by: INTERNAL MEDICINE

## 2022-02-02 PROCEDURE — 94760 N-INVAS EAR/PLS OXIMETRY 1: CPT

## 2022-02-02 PROCEDURE — 94060 EVALUATION OF WHEEZING: CPT

## 2022-02-02 PROCEDURE — 94729 DIFFUSING CAPACITY: CPT

## 2022-02-02 PROCEDURE — 94726 PLETHYSMOGRAPHY LUNG VOLUMES: CPT

## 2022-02-02 PROCEDURE — 94726 PLETHYSMOGRAPHY LUNG VOLUMES: CPT | Performed by: INTERNAL MEDICINE

## 2022-02-02 PROCEDURE — 94060 EVALUATION OF WHEEZING: CPT | Performed by: INTERNAL MEDICINE

## 2022-02-02 RX ORDER — ALBUTEROL SULFATE 2.5 MG/3ML
2.5 SOLUTION RESPIRATORY (INHALATION) ONCE
Status: COMPLETED | OUTPATIENT
Start: 2022-02-02 | End: 2022-02-02

## 2022-02-02 RX ADMIN — ALBUTEROL SULFATE 2.5 MG: 2.5 SOLUTION RESPIRATORY (INHALATION) at 08:47

## 2022-02-03 ENCOUNTER — HOSPITAL ENCOUNTER (OUTPATIENT)
Dept: NON INVASIVE DIAGNOSTICS | Age: 42
Discharge: HOME | End: 2022-02-03
Payer: COMMERCIAL

## 2022-02-03 DIAGNOSIS — R06.02 SHORTNESS OF BREATH: ICD-10-CM

## 2022-02-03 DIAGNOSIS — R07.89 CHEST TIGHTNESS: ICD-10-CM

## 2022-02-03 LAB
MAX HR PERCENT: 95 %
RATE PRESSURE PRODUCT: NORMAL
SL CV STRESS STAGE REACHED: 3
STRESS ANGINA INDEX: 1
STRESS BASELINE HR: 72 BPM
STRESS PEAK HR: 171 BPM
STRESS POST ESTIMATED WORKLOAD: 10.1 METS
STRESS POST EXERCISE DUR MIN: 8 MIN
STRESS POST EXERCISE DUR SEC: 14 SEC
STRESS POST PEAK BP: 152 MMHG

## 2022-02-03 PROCEDURE — 93017 CV STRESS TEST TRACING ONLY: CPT

## 2022-02-03 PROCEDURE — 93016 CV STRESS TEST SUPVJ ONLY: CPT | Performed by: INTERNAL MEDICINE

## 2022-02-03 PROCEDURE — 93018 CV STRESS TEST I&R ONLY: CPT | Performed by: INTERNAL MEDICINE

## 2022-02-06 ENCOUNTER — HOSPITAL ENCOUNTER (OUTPATIENT)
Dept: MAMMOGRAPHY | Facility: IMAGING CENTER | Age: 42
Discharge: HOME | End: 2022-02-06
Payer: COMMERCIAL

## 2022-02-06 VITALS — BODY MASS INDEX: 33.79 KG/M2 | WEIGHT: 202.82 LBS | HEIGHT: 65 IN

## 2022-02-06 DIAGNOSIS — Z12.31 ENCOUNTER FOR SCREENING MAMMOGRAM FOR BREAST CANCER: ICD-10-CM

## 2022-02-06 DIAGNOSIS — Z12.31 VISIT FOR SCREENING MAMMOGRAM: ICD-10-CM

## 2022-02-06 PROCEDURE — 77067 SCR MAMMO BI INCL CAD: CPT

## 2022-02-06 PROCEDURE — 77063 BREAST TOMOSYNTHESIS BI: CPT

## 2022-02-08 ENCOUNTER — TELEPHONE (OUTPATIENT)
Dept: OBGYN CLINIC | Facility: CLINIC | Age: 42
End: 2022-02-08

## 2022-02-08 NOTE — RESULT ENCOUNTER NOTE
Screening mammogram with birads 0 on left  Need further dx imaging of 10 mm mass  Patient has the studies scheduled  Spoke to her about the results via phone  I will watch for further findings and advise her accordingly

## 2022-02-08 NOTE — TELEPHONE ENCOUNTER
Pt called nervous about her mammo results  Would you be able to take a look at the results when you get a chance

## 2022-02-09 ENCOUNTER — OFFICE VISIT (OUTPATIENT)
Dept: FAMILY MEDICINE CLINIC | Facility: CLINIC | Age: 42
End: 2022-02-09
Payer: COMMERCIAL

## 2022-02-09 VITALS
TEMPERATURE: 98.2 F | HEART RATE: 73 BPM | HEIGHT: 65 IN | DIASTOLIC BLOOD PRESSURE: 80 MMHG | BODY MASS INDEX: 34.32 KG/M2 | RESPIRATION RATE: 16 BRPM | SYSTOLIC BLOOD PRESSURE: 110 MMHG | OXYGEN SATURATION: 99 % | WEIGHT: 206 LBS

## 2022-02-09 DIAGNOSIS — R07.81 RIB PAIN: ICD-10-CM

## 2022-02-09 DIAGNOSIS — M54.2 NECK PAIN: Primary | ICD-10-CM

## 2022-02-09 DIAGNOSIS — R92.8 ABNORMAL MAMMOGRAM: ICD-10-CM

## 2022-02-09 DIAGNOSIS — M25.512 ACUTE PAIN OF LEFT SHOULDER: ICD-10-CM

## 2022-02-09 DIAGNOSIS — R07.89 CHEST TIGHTNESS: ICD-10-CM

## 2022-02-09 LAB
CHEST PAIN STATEMENT: NORMAL
MAX DIASTOLIC BP: 88 MMHG
MAX HEART RATE: 171 BPM
MAX PREDICTED HEART RATE: 179 BPM
MAX. SYSTOLIC BP: 152 MMHG
PROTOCOL NAME: NORMAL
REASON FOR TERMINATION: NORMAL
TARGET HR FORMULA: NORMAL
TEST INDICATION: NORMAL
TIME IN EXERCISE PHASE: NORMAL

## 2022-02-09 PROCEDURE — 1036F TOBACCO NON-USER: CPT | Performed by: NURSE PRACTITIONER

## 2022-02-09 PROCEDURE — 99214 OFFICE O/P EST MOD 30 MIN: CPT | Performed by: NURSE PRACTITIONER

## 2022-02-09 NOTE — PROGRESS NOTES
FAMILY PRACTICE OFFICE VISIT       NAME: Earline Webb  AGE: 39 y o  SEX: female       : 1980        MRN: [id number]    Assessment and Plan   1  Neck pain  -     Ambulatory Referral to Physical Therapy; Future    2  Acute pain of left shoulder  -     Ambulatory Referral to Physical Therapy; Future    3  Rib pain    4  Chest tightness    5  Abnormal mammogram  Comments:  Concerned about abnormal mammogram, will have breast US and diagnostic mammogram later this week  Chest tightness has resolved  Normal stress exercise test and normal PFT  Currently for the last 2 weeks has left upper arm pain, shoulder pain, neck pain  Also has had lower ribcage tenderness with touch and movement  No known injuries  Currently working, doing computer work  Suspect pain is muscular in origin  Will send to physical therapy  Call if pain is not improving with physical therapy, or if pain should worsen  Chief Complaint     Chief Complaint   Patient presents with    Follow-up     Pt is here for f/u chest tightness    Shoulder Pain     Left shoulder and neck pain     Headache       History of Present Illness     Earline Webb is a 39year old female presenting today for follow up  Chest feels better  Used Wixela for 1 week,now tends to forget  Chest felt clear after using albuterol for PFT  Left shoulder and upper arm pain  2 weeks now  Left lower ribs sore to touch, movement causes pain at times  Recent mammogram  Rib pain started before mammogram   Breast mass noted on mammogram--diagnostic mammogram and US later this week  November kick boxing class  Last day of November  Lots of punching  Had no pain at the time  Right handed  Pain down left arm, and neck   Tingling tips of 2nd and 3rd fingers night time  Works desk job  Computer work  No coughing  No recent exercise       Weak with abduction   No tenderness neck or back   Phyisc ltherapy--would do  Not isiah call  Review of Systems   Review of Systems   Constitutional: Negative  HENT: Negative  Respiratory: Negative  Cardiovascular: Positive for chest pain (left lower rib cage with movement and touch)  Negative for palpitations and leg swelling  Gastrointestinal: Negative  Musculoskeletal: Positive for arthralgias (left arm pain) and neck pain  Skin: Negative for rash  Neurological: Positive for numbness  Negative for weakness  Hematological: Negative  Psychiatric/Behavioral: Negative  I have reviewed the patient's medical history in detail; there are no changes to the history as noted in the electronic medical record  Objective     Vitals:    02/09/22 1642   BP: 110/80   Pulse: 73   Resp: 16   Temp: 98 2 °F (36 8 °C)   TempSrc: Temporal   SpO2: 99%   Weight: 93 4 kg (206 lb)   Height: 5' 5" (1 651 m)     Wt Readings from Last 3 Encounters:   02/11/22 93 4 kg (206 lb)   02/09/22 93 4 kg (206 lb)   02/06/22 92 kg (202 lb 13 2 oz)     Physical Exam  Vitals and nursing note reviewed  Constitutional:       General: She is not in acute distress  Appearance: Normal appearance  She is not ill-appearing  HENT:      Head: Normocephalic and atraumatic  Cardiovascular:      Rate and Rhythm: Normal rate and regular rhythm  Heart sounds: No murmur heard  Pulmonary:      Effort: Pulmonary effort is normal  No respiratory distress  Breath sounds: Normal breath sounds  No wheezing or rales  Chest:      Comments: Left lower 2 anterior ribs tender to palpation  Musculoskeletal:      Right shoulder: Normal       Left shoulder: No swelling or deformity  Normal range of motion  Decreased strength (left shoulder abduction strength 4/5 )  Right lower leg: No edema  Left lower leg: No edema  Skin:     Findings: No rash  Neurological:      Mental Status: She is alert     Psychiatric:         Mood and Affect: Mood normal  ALLERGIES:  Allergies   Allergen Reactions    Penicillins Itching    Amoxicillin Rash     Category: Allergy;        Current Medications     Current Outpatient Medications   Medication Sig Dispense Refill    acetaminophen (TYLENOL) 325 mg tablet Take 2 tablets (650 mg total) by mouth every 6 (six) hours as needed for mild pain or headaches 30 tablet 0    albuterol (Proventil HFA) 90 mcg/act inhaler Inhale 2 puffs every 6 (six) hours as needed (cough, chest tightness, shortness of breath) 18 g 2    fluticasone-salmeterol (Wixela Inhub) 250-50 mcg/dose inhaler Inhale 1 puff 2 (two) times a day Rinse mouth after use  180 blister 3    norethindrone (Ortho Micronor) 0 35 MG tablet Take 1 tablet (0 35 mg total) by mouth daily 84 tablet 1    triamcinolone (KENALOG) 0 1 % ointment APPLY TO AFFECTED AREA TWICE A DAY FOR 7 DAYS (Patient taking differently: Apply 1 application topically as needed ) 30 g 0    valACYclovir (VALTREX) 1,000 mg tablet Take 1 tablet (1,000 mg total) by mouth daily 90 tablet 3     No current facility-administered medications for this visit           Health Maintenance     Health Maintenance   Topic Date Due    Hepatitis C Screening  Never done    Pneumococcal Vaccine: Pediatrics (0 to 5 Years) and At-Risk Patients (6 to 59 Years) (1 of 2 - PPSV23) Never done    Influenza Vaccine (1) 09/01/2021    COVID-19 Vaccine (3 - Booster for Pfizer series) 09/30/2021    BMI: Followup Plan  08/11/2022    Depression Screening  01/19/2023    Annual Physical  01/19/2023    BMI: Adult  02/11/2023    Breast Cancer Screening: Mammogram  02/11/2023    Cervical Cancer Screening  04/20/2025    DTaP,Tdap,and Td Vaccines (2 - Td or Tdap) 09/03/2030    HIV Screening  Completed    HIB Vaccine  Aged Out    Hepatitis B Vaccine  Aged Out    IPV Vaccine  Aged Out    Hepatitis A Vaccine  Aged Out    Meningococcal ACWY Vaccine  Aged Out    HPV Vaccine  Aged [employer] History   Administered Date(s) Administered    COVID-19 PFIZER VACCINE 0 3 ML IM 04/09/2021, 04/30/2021    Influenza, injectable, quadrivalent, preservative free 0 5 mL 04/20/2020, 11/12/2020    Rho (D) Immune Globulin 03/10/2020, 08/11/2020, 10/03/2020    Tdap 09/03/2020       QUE Sebastian

## 2022-02-11 ENCOUNTER — HOSPITAL ENCOUNTER (OUTPATIENT)
Dept: ULTRASOUND IMAGING | Facility: CLINIC | Age: 42
Discharge: HOME | End: 2022-02-11
Payer: COMMERCIAL

## 2022-02-11 ENCOUNTER — HOSPITAL ENCOUNTER (OUTPATIENT)
Dept: MAMMOGRAPHY | Facility: CLINIC | Age: 42
Discharge: HOME | End: 2022-02-11
Payer: COMMERCIAL

## 2022-02-11 VITALS — BODY MASS INDEX: 34.32 KG/M2 | HEIGHT: 65 IN | WEIGHT: 206 LBS

## 2022-02-11 DIAGNOSIS — R92.8 ABNORMAL MAMMOGRAM: ICD-10-CM

## 2022-02-11 PROCEDURE — 77065 DX MAMMO INCL CAD UNI: CPT

## 2022-02-11 PROCEDURE — G0279 TOMOSYNTHESIS, MAMMO: HCPCS

## 2022-02-11 PROCEDURE — 76642 ULTRASOUND BREAST LIMITED: CPT

## 2022-02-15 ENCOUNTER — HOSPITAL ENCOUNTER (OUTPATIENT)
Dept: MAMMOGRAPHY | Facility: CLINIC | Age: 42
Discharge: HOME | End: 2022-02-15
Payer: COMMERCIAL

## 2022-02-15 ENCOUNTER — HOSPITAL ENCOUNTER (OUTPATIENT)
Dept: ULTRASOUND IMAGING | Facility: CLINIC | Age: 42
Discharge: HOME | End: 2022-02-15
Payer: COMMERCIAL

## 2022-02-15 VITALS — SYSTOLIC BLOOD PRESSURE: 112 MMHG | DIASTOLIC BLOOD PRESSURE: 70 MMHG | HEART RATE: 76 BPM

## 2022-02-15 DIAGNOSIS — R92.8 ABNORMAL MAMMOGRAM: ICD-10-CM

## 2022-02-15 DIAGNOSIS — R92.2 INCONCLUSIVE MAMMOGRAM: ICD-10-CM

## 2022-02-15 PROCEDURE — 88305 TISSUE EXAM BY PATHOLOGIST: CPT | Performed by: SPECIALIST

## 2022-02-15 PROCEDURE — A4648 IMPLANTABLE TISSUE MARKER: HCPCS

## 2022-02-15 PROCEDURE — 77066 DX MAMMO INCL CAD BI: CPT

## 2022-02-15 PROCEDURE — 19083 BX BREAST 1ST LESION US IMAG: CPT

## 2022-02-15 RX ORDER — LIDOCAINE HYDROCHLORIDE 10 MG/ML
5 INJECTION, SOLUTION EPIDURAL; INFILTRATION; INTRACAUDAL; PERINEURAL ONCE
Status: COMPLETED | OUTPATIENT
Start: 2022-02-15 | End: 2022-02-15

## 2022-02-15 RX ADMIN — LIDOCAINE HYDROCHLORIDE 5 ML: 10 INJECTION, SOLUTION EPIDURAL; INFILTRATION; INTRACAUDAL; PERINEURAL at 09:07

## 2022-02-15 RX ADMIN — IOHEXOL 100 ML: 350 INJECTION, SOLUTION INTRAVENOUS at 08:09

## 2022-02-15 NOTE — PROGRESS NOTES
Ice pack given:    __X___yes _____no    Discharge instructions signed by patient:    ____X_yes _____no    Discharge instructions given to patient:    ___X__yes _____no    Discharged via:    __X___ambulatory    _____wheelchair    _____stretcher    Stable on discharge:    ___X__yes ____no

## 2022-02-15 NOTE — PROGRESS NOTES
Procedure type:    ___X__ultrasound guided _____stereotactic    Breast:    __X___Left _____Right    Location: Left breast 11 o'clock 10 cm Fn    Needle: 12ga Mary Jane    # of passes: 3    Clip: Wing(Ultraclip)    Performed by: Dr Mohit Mendoza held for 5 minutes by:  Niraj Oden(PCA)    Steri Strips:    ___X__yes _____no    Band aid:    __X___yes_____no    Tape and guaze:    _____yes __X___no    Tolerated procedure:    ____X_yes _____no

## 2022-02-15 NOTE — PROGRESS NOTES
Ice pack given:    ____X_yes _____no    Discharge instructions signed by patient:    ___X__yes _____no    Discharge instructions given to patient:    ___X__yes _____no    Discharged via:    ___X__ambulatory    _____wheelchair    _____stretcher    Stable on discharge:    __X___yes ____no

## 2022-02-15 NOTE — DISCHARGE INSTR - OTHER ORDERS
POST LARGE CORE BREAST BIOPSY PATIENT INFORMATION      Place an ice pack inside your bra over the top of the dressing every hour for 20 minutes (20 minutes on, 60 minutes off)  Do this until bedtime  Do not shower or bathe until the following morning  You may bathe your breast carefully with the steri-strips in place  Be careful    Not to loosen them  The steri-strips will fall off in 3-5 days  You may have mild discomfort, and you may have some bruising where the   Needle entered the skin  This should clear within 5-7 days  If you need medicine for discomfort, take acetaminophen products such as   Tylenol  You may also take Advil or Motrin products  Do not participate in strenuous activities such as-tennis, aerobics, skiing,  Weight lifting, etc  for 24 hours  Refrain from swimming/soaking for 72 hours  Wearing a bra for sleeping may be more comfortable for the first 24-48 hours  Watch for continued bleeding, pain or fever over 101; please call with any questions or concerns  For procedures done at the [facility]  [address] call:  Peter Hester RN at [phone]  Radha Vega RN at [phone]                    *After 4 PM call the Interventional Radiology Department                    [phone] and ask to speak with the nurse on call  For procedures done at the [address] [place] [address] call:         Dayana Patel RN at   *After 4 PM call the Interventional Radiology Department   [phone] and ask to speak with the nurse on call  For procedures done at [address] [place] [address] call: The Radiology Nurse at [phone]  *After 4 PM call your physician, or go to the Emergency Department  9          The final results of your biopsy are usually available within one week

## 2022-02-15 NOTE — PROGRESS NOTES
Met with patient and Dr Boston Moment  regarding recommendation for;    _____ RIGHT ___X___LEFT      ___X__Ultrasound guided  ______Stereotactic breast biopsy  __X___Verbalized understanding        Blood thinners:  No: _X____ Yes: ______ What:                 Biopsy teaching sheet given:  Yes: ___X___ No: ________    Pt given contact information and adv to call with any questions/needs

## 2022-02-16 ENCOUNTER — TELEPHONE (OUTPATIENT)
Dept: MAMMOGRAPHY | Facility: CLINIC | Age: 42
End: 2022-02-16

## 2022-02-16 NOTE — PROGRESS NOTES
Post procedure call completed    Bleeding: _____yes __x___no    Pain: _____yes ___x___no    Redness/Swelling: ______yes __x____no    Band aid removed: ___x__yes _____no    Steri-Strips intact: ___x___yes _____no

## 2022-02-17 ENCOUNTER — TELEPHONE (OUTPATIENT)
Dept: MAMMOGRAPHY | Facility: CLINIC | Age: 42
End: 2022-02-17

## 2022-02-24 ENCOUNTER — ANNUAL EXAM (OUTPATIENT)
Dept: OBGYN CLINIC | Facility: CLINIC | Age: 42
End: 2022-02-24
Payer: COMMERCIAL

## 2022-02-24 VITALS
DIASTOLIC BLOOD PRESSURE: 74 MMHG | BODY MASS INDEX: 34.22 KG/M2 | HEIGHT: 65 IN | SYSTOLIC BLOOD PRESSURE: 112 MMHG | WEIGHT: 205.4 LBS

## 2022-02-24 DIAGNOSIS — Z30.41 ORAL CONTRACEPTIVE PILL SURVEILLANCE: ICD-10-CM

## 2022-02-24 DIAGNOSIS — Z12.31 ENCOUNTER FOR SCREENING MAMMOGRAM FOR BREAST CANCER: ICD-10-CM

## 2022-02-24 DIAGNOSIS — Z01.419 ENCOUNTER FOR GYNECOLOGICAL EXAMINATION WITHOUT ABNORMAL FINDING: Primary | ICD-10-CM

## 2022-02-24 DIAGNOSIS — B00.9 HERPES SIMPLEX TYPE 2 (HSV-2) INFECTION AFFECTING PREGNANCY, ANTEPARTUM, SECOND TRIMESTER: ICD-10-CM

## 2022-02-24 DIAGNOSIS — O98.512 HERPES SIMPLEX TYPE 2 (HSV-2) INFECTION AFFECTING PREGNANCY, ANTEPARTUM, SECOND TRIMESTER: ICD-10-CM

## 2022-02-24 PROCEDURE — 3008F BODY MASS INDEX DOCD: CPT | Performed by: NURSE PRACTITIONER

## 2022-02-24 PROCEDURE — S0612 ANNUAL GYNECOLOGICAL EXAMINA: HCPCS | Performed by: NURSE PRACTITIONER

## 2022-02-24 RX ORDER — VALACYCLOVIR HYDROCHLORIDE 1 G/1
1000 TABLET, FILM COATED ORAL DAILY
Qty: 90 TABLET | Refills: 3 | Status: SHIPPED | OUTPATIENT
Start: 2022-02-24 | End: 2023-02-24

## 2022-02-24 RX ORDER — ACETAMINOPHEN AND CODEINE PHOSPHATE 120; 12 MG/5ML; MG/5ML
1 SOLUTION ORAL DAILY
Qty: 84 TABLET | Refills: 4 | Status: SHIPPED | OUTPATIENT
Start: 2022-02-24 | End: 2022-04-26 | Stop reason: SDUPTHER

## 2022-02-24 NOTE — PROGRESS NOTES
Assessment / Plan    1  Encounter for gynecological examination without abnormal finding  Normal well woman exam  2020 pap/hpv negative  Repeat next year  2  Encounter for screening mammogram for breast cancer  Order provided for next year    - Mammo screening bilateral w 3d & cad; Future    3  Oral contraceptive pill surveillance  Refills sent    - norethindrone (Ortho Micronor) 0 35 MG tablet; Take 1 tablet (0 35 mg total) by mouth daily  Dispense: 84 tablet; Refill: 4    4  Herpes simplex type 2 (HSV-2) infection affecting pregnancy, antepartum, second trimester  Refills sent  ]  - valACYclovir (VALTREX) 1,000 mg tablet; Take 1 tablet (1,000 mg total) by mouth daily  Dispense: 90 tablet; Refill: 3        Subjective      Shlomo Paget is a 39 y o  female who presents for her annual gynecologic exam     40 yo  presents for routine GYN exam     2020 pap/hpv negative  2022 mammo w/ abn finding in left breast; 2/15/22 US guided breast biopsy fibroadenoma  Resume routine screening  OCP-micronor    Periods are regular   Current contraception: oral progesterone-only contraceptive  History of abnormal Pap smear: yes   Family history of breast,uterine, ovarian or colon cancer: no      Menstrual History:  OB History        4    Para   3    Term   2       1    AB   1    Living   2       SAB        IAB   1    Ectopic        Multiple   0    Live Births   2           Obstetric Comments   Menarche: 12   x 1                  Patient's last menstrual period was 2022  The following portions of the patient's history were reviewed and updated as appropriate: allergies, current medications, past family history, past medical history, past social history, past surgical history and problem list     Review of Systems      Review of Systems   Constitutional: Negative for chills and fever  Respiratory: Negative for cough and shortness of breath      Gastrointestinal: Negative for abdominal distention, abdominal pain, blood in stool, constipation, diarrhea, nausea and vomiting  Genitourinary: Negative for difficulty urinating, dysuria, frequency, genital sores, hematuria, menstrual problem, pelvic pain, urgency, vaginal bleeding and vaginal discharge  Musculoskeletal: Negative for arthralgias and myalgias  Breasts:  Negative for skin changes, dimpling, asymmetry, nipple discharge, redness, tenderness or palpable masses    Objective      /74 (BP Location: Left arm, Patient Position: Sitting, Cuff Size: Adult)   Ht 5' 5" (1 651 m)   Wt 93 2 kg (205 lb 6 4 oz)   LMP 02/21/2022 Comment: denies pregnancy  BMI 34 18 kg/m²      Physical Exam  Constitutional:       General: She is not in acute distress  Appearance: Normal appearance  She is well-developed  She is obese  She is not ill-appearing or diaphoretic  HENT:      Head: Normocephalic and atraumatic  Eyes:      Pupils: Pupils are equal, round, and reactive to light  Neck:      Thyroid: No thyromegaly  Pulmonary:      Effort: Pulmonary effort is normal    Chest:   Breasts: Breasts are symmetrical       Right: No inverted nipple, mass, nipple discharge, skin change, tenderness or supraclavicular adenopathy  Left: No inverted nipple, mass, nipple discharge, skin change, tenderness or supraclavicular adenopathy  Abdominal:      General: There is no distension  Palpations: Abdomen is soft  There is no mass  Tenderness: There is no abdominal tenderness  There is no guarding or rebound  Genitourinary:     General: Normal vulva  Exam position: Lithotomy position  Labia:         Right: No rash, tenderness, lesion or injury  Left: No rash, tenderness, lesion or injury  Vagina: No signs of injury and foreign body  No vaginal discharge, erythema, tenderness or bleeding  Cervix: No cervical motion tenderness, discharge or friability  Uterus: Not enlarged and not tender  Adnexa:         Right: No mass, tenderness or fullness  Left: No mass, tenderness or fullness  Rectum: Normal    Musculoskeletal:      Cervical back: Neck supple  Lymphadenopathy:      Cervical: No cervical adenopathy  Upper Body:      Right upper body: No supraclavicular adenopathy  Left upper body: No supraclavicular adenopathy  Skin:     General: Skin is warm and dry  Neurological:      General: No focal deficit present  Mental Status: She is alert and oriented to person, place, and time  She is not disoriented  Psychiatric:         Mood and Affect: Mood normal          Behavior: Behavior normal          Thought Content:  Thought content normal          Judgment: Judgment normal

## 2022-02-24 NOTE — PATIENT INSTRUCTIONS
Weight Management   WHAT YOU NEED TO KNOW:   Being overweight increases your risk of health conditions such as heart disease, high blood pressure, type 2 diabetes, and certain types of cancer  It can also increase your risk for osteoarthritis, sleep apnea, and other respiratory problems  Aim for a slow, steady weight loss  Even a small amount of weight loss can lower your risk of health problems  DISCHARGE INSTRUCTIONS:   How to lose weight safely:  A safe and healthy way to lose weight is to eat fewer calories and get regular exercise  · You can lose up about 1 pound a week by decreasing the number of calories you eat by 500 calories each day  You can decrease calories by eating smaller portion sizes or by cutting out high-calorie foods  Read labels to find out how many calories are in the foods you eat  · You can also burn calories with exercise such as walking, swimming, or biking  You will be more likely to keep weight off if you make these changes part of your lifestyle  Exercise at least 30 minutes per day on most days of the week  You can also fit in more physical activity by taking the stairs instead of the elevator or parking farther away from stores  Ask your healthcare provider about the best exercise plan for you  Healthy meal plan for weight management:  A healthy meal plan includes a variety of foods, contains fewer calories, and helps you stay healthy  A healthy meal plan includes the following:     · Eat whole-grain foods more often  A healthy meal plan should contain fiber  Fiber is the part of grains, fruits, and vegetables that is not broken down by your body  Whole-grain foods are healthy and provide extra fiber in your diet  Some examples of whole-grain foods are whole-wheat breads and pastas, oatmeal, brown rice, and bulgur  · Eat a variety of vegetables every day  Include dark, leafy greens such as spinach, kale, michael greens, and mustard greens   Eat yellow and orange vegetables such as carrots, sweet potatoes, and winter squash  · Eat a variety of fruits every day  Choose fresh or canned fruit (canned in its own juice or light syrup) instead of juice  Fruit juice has very little or no fiber  · Eat low-fat dairy foods  Drink fat-free (skim) milk or 1% milk  Eat fat-free yogurt and low-fat cottage cheese  Try low-fat cheeses such as mozzarella and other reduced-fat cheeses  · Choose meat and other protein foods that are low in fat  Choose beans or other legumes such as split peas or lentils  Choose fish, skinless poultry (chicken or turkey), or lean cuts of red meat (beef or pork)  Before you cook meat or poultry, cut off any visible fat  · Use less fat and oil  Try baking foods instead of frying them  Add less fat, such as margarine, sour cream, regular salad dressing, and mayonnaise to foods  Eat fewer high-fat foods  Some examples of high-fat foods include french fries, doughnuts, ice cream, and cakes  · Eat fewer sweets  Limit foods and drinks that are high in sugar  This includes candy, cookies, regular soda, and sweetened drinks  Ways to decrease calories:   · Eat smaller portions  ? Use a small plate with smaller servings  ? Do not eat second helpings  ? When you eat at a restaurant, ask for a box and place half of your meal in the box before you eat  ? Share an entrée with someone else  · Replace high-calorie snacks with healthy, low-calorie snacks  ? Choose fresh fruit, vegetables, fat-free rice cakes, or air-popped popcorn instead of potato chips, nuts, or chocolate  ? Choose water or calorie-free drinks instead of soda or sweetened drinks  · Do not shop for groceries when you are hungry  You may be more likely to make unhealthy food choices  Take a grocery list of healthy foods and shop after you have eaten  · Eat regular meals  Do not skip meals  Skipping meals can lead to overeating later in the day   This can make it harder for you to lose weight  Eat a healthy snack in place of a meal if you do not have time to eat a regular meal  Talk with a dietitian to help you create a meal plan and schedule that is right for you  Other things to consider as you try to lose weight:   · Be aware of situations that may give you the urge to overeat, such as eating while watching television  Find ways to avoid these situations  For example, read a book, go for a walk, or do crafts  · Meet with a weight loss support group or friends who are also trying to lose weight  This may help you stay motivated to continue working on your weight loss goals  © Copyright [employer] 2021 Information is for End User's use only and may not be sold, redistributed or otherwise used for commercial purposes  All illustrations and images included in CareNotes® are the copyrighted property of [employer]  or [employer]   The above information is an  only  It is not intended as medical advice for individual conditions or treatments  Talk to your doctor, nurse or pharmacist before following any medical regimen to see if it is safe and effective for you

## 2022-04-26 ENCOUNTER — TELEPHONE (OUTPATIENT)
Dept: OBGYN CLINIC | Facility: CLINIC | Age: 42
End: 2022-04-26

## 2022-04-26 DIAGNOSIS — Z30.41 ORAL CONTRACEPTIVE PILL SURVEILLANCE: ICD-10-CM

## 2022-04-26 RX ORDER — ACETAMINOPHEN AND CODEINE PHOSPHATE 120; 12 MG/5ML; MG/5ML
1 SOLUTION ORAL DAILY
Qty: 84 TABLET | Refills: 3 | Status: SHIPPED | OUTPATIENT
Start: 2022-04-26

## 2022-04-26 NOTE — TELEPHONE ENCOUNTER
Spoke to pharmacy and patient  Pt informed me that the pharmacy told her there were not refills available  After speaking to pharmacy, they told me that enough refills were available however with her insurance coverage she was only eligible for a month at a time  I notified pt of this and she will reaching out to her insurance company

## 2022-04-26 NOTE — TELEPHONE ENCOUNTER
Refills have been requested for the following medications:         norethindrone (Ortho Micronor) 0 35 MG tablet MEE Harris     Preferred pharmacy: [facility]/PHARM[facility]Y [address] [place] [address] - [facility]   [address] [facility]

## 2022-05-16 ENCOUNTER — TELEPHONE (OUTPATIENT)
Dept: FAMILY MEDICINE CLINIC | Facility: CLINIC | Age: 42
End: 2022-05-16

## 2022-05-16 NOTE — TELEPHONE ENCOUNTER
Patient called, stated that she tested positive for covid on Wednesday 5/11, she stated she is still coughing, she wanted to know if she could get a note that she can work from home for the rest  Is this ok to write?

## 2023-01-24 ENCOUNTER — OFFICE VISIT (OUTPATIENT)
Dept: FAMILY MEDICINE CLINIC | Facility: CLINIC | Age: 43
End: 2023-01-24

## 2023-01-24 VITALS
RESPIRATION RATE: 18 BRPM | BODY MASS INDEX: 36.9 KG/M2 | SYSTOLIC BLOOD PRESSURE: 100 MMHG | HEIGHT: 65 IN | TEMPERATURE: 98.7 F | HEART RATE: 78 BPM | DIASTOLIC BLOOD PRESSURE: 80 MMHG | OXYGEN SATURATION: 97 % | WEIGHT: 221.5 LBS

## 2023-01-24 DIAGNOSIS — R06.02 SHORTNESS OF BREATH: Primary | ICD-10-CM

## 2023-01-24 DIAGNOSIS — R05.9 COUGH, UNSPECIFIED TYPE: ICD-10-CM

## 2023-01-24 DIAGNOSIS — L73.9 FOLLICULITIS: ICD-10-CM

## 2023-01-24 RX ORDER — DOXYCYCLINE HYCLATE 100 MG/1
CAPSULE ORAL
COMMUNITY
Start: 2023-01-13

## 2023-01-24 RX ORDER — FAMOTIDINE 40 MG/1
40 TABLET, FILM COATED ORAL DAILY
Qty: 30 TABLET | Refills: 0 | Status: SHIPPED | OUTPATIENT
Start: 2023-01-24

## 2023-01-24 NOTE — ASSESSMENT & PLAN NOTE
Cough   Symptoms may be multifactorial however we discussed possibility of possible reflux disease causing cough and throat irritation  She was given prescription for famotidine 40 mg take once daily for minimum of 2 weeks    Patient will call if symptoms persist

## 2023-01-24 NOTE — ASSESSMENT & PLAN NOTE
Chest tightness  Patient has been on several antibiotics and steroids  Patient had a pulmonary function test last year that was read as normal   She will restart her Wixela inhaler twice daily to see if symptoms improve over the next 2 weeks

## 2023-01-24 NOTE — PROGRESS NOTES
FAMILY PRACTICE OFFICE VISIT       NAME: Lolis Jacobson  AGE: 43 y o  SEX: female       : 1980        MRN: [id number]    DATE: 2023  TIME: 12:43 PM    Assessment and Plan     Problem List Items Addressed This Visit        Musculoskeletal and Integument    Folliculitis     Folliculitis  Patient was suspected mild case of folliculitis on the neck  She was given Bactroban ointment to apply 3 times daily to the affected area  Relevant Medications    mupirocin (BACTROBAN) 2 % ointment       Other    Cough     Cough  Symptoms may be multifactorial however we discussed possibility of possible reflux disease causing cough and throat irritation  She was given prescription for famotidine 40 mg take once daily for minimum of 2 weeks  Patient will call if symptoms persist         Relevant Medications    famotidine (PEPCID) 40 MG tablet    Shortness of breath - Primary     Chest tightness  Patient has been on several antibiotics and steroids  Patient had a pulmonary function test last year that was read as normal   She will restart her Wixela inhaler twice daily to see if symptoms improve over the next 2 weeks  Chief Complaint     Chief Complaint   Patient presents with   • bump on neck      X 2 weeks        History of Present Illness     Patient in the office stating that since 2022 she has been seen at an urgent care center on 3 occasions  Initially she had coughing and sore throat  During one of her visits she did have a chest x-ray that did not show any pneumonia  She has been on 3 courses of antibiotics most recently she is finishing up a 10-day course of doxycycline twice daily  Along the way she was also prescribed steroid tapering dose twice without full relief in symptoms  Although she is feeling somewhat better she still complains of coughing and mild sore throat irritation    Over the past 2 weeks she has developed some small red bumps on her anterior neck area      Review of Systems   Review of Systems   Constitutional: Negative  HENT: Positive for sore throat  Respiratory: Positive for cough  Cardiovascular: Negative  Gastrointestinal: Negative  Skin: Positive for rash         Active Problem List     Patient Active Problem List   Diagnosis   • Dietary calcium deficiency   • Generalized anxiety disorder   • Pap smear abnormality of cervix/human papillomavirus (HPV) positive   • Fibroids, intramural   • Inadequate exercise   • Class 1 obesity   • Vitamin D deficiency   • ASCUS with positive high risk HPV cervical   • Seborrhea   • Obesity affecting pregnancy, antepartum, third trimester   • Rh negative status during pregnancy in third trimester   • Uterine fibroid during pregnancy, antepartum   • History of herpes genitalis   • Herpes simplex type 2 (HSV-2) infection affecting pregnancy, antepartum, third trimester   • Gestational diabetes   • Excessive fetal growth affecting management of pregnancy in third trimester   • Cholestasis during pregnancy in third trimester   • Group B Streptococcus carrier, antepartum   •  (spontaneous vaginal delivery)   • Cough   • Shortness of breath   • Folliculitis       Past Medical History:  Past Medical History:   Diagnosis Date   • Abnormal Pap smear of cervix     2017-wnl, HPV + (18/45), colpo; 2018-ASCUS + HPV: -colpo squamous atypia; 2019: wnl, HPV neg; 2020 pap neg/hpv neg   • Asthma    • Attention disturbance     last assessed: 2017   • BV (bacterial vaginosis)    • Cholestasis during pregnancy in third trimester 2020   • Fibroid     4 cm lower uterine segment   • Gestational diabetes    • Headache    • Herpes     episodic rx   • HPV (human papilloma virus) infection    • Need for prophylactic vaccination against human papillomavirus (HPV) types 6, 11, 16, and 18     declines   • Varicella        Past Surgical History:  Past Surgical History:   Procedure Laterality Date   • COLONOSCOPY 06/2019    wnl   • DILATION AND CURETTAGE, DIAGNOSTIC / THERAPEUTIC      ETOP   • US GUIDED BREAST BIOPSY LEFT COMPLETE Left 2/15/2022    benign fibroadenoma       Family History:  Family History   Problem Relation Age of Onset   • Autoimmune disease Mother    • No Known Problems Father    • No Known Problems Sister    • Alzheimer's disease Maternal Grandmother    • Hypertension Maternal Grandfather    • Heart failure Maternal Grandfather    • Cancer Paternal Grandmother         unsure what type   • Pancreatic cancer Paternal Grandmother    • No Known Problems Daughter    • Sjogren's syndrome Other         sjogren's disease   • Hypertension Cousin    • Gestational diabetes Cousin    • Polycystic kidney disease Family    • Stroke Maternal Aunt    • No Known Problems Other    • Breast cancer Neg Hx    • Ovarian cancer Neg Hx    • Colon cancer Neg Hx        Social History:  Social History     Socioeconomic History   • Marital status: Single     Spouse name: Not on file   • Number of children: 1   • Years of education: associates degree; 2 yr college   • Highest education level: Not on file   Occupational History   • Occupation: Billing   Tobacco Use   • Smoking status: Never   • Smokeless tobacco: Never   Vaping Use   • Vaping Use: Never used   Substance and Sexual Activity   • Alcohol use:  Yes     Alcohol/week: 1 0 standard drink     Types: 1 Glasses of wine per week     Comment: rare   • Drug use: No   • Sexual activity: Yes     Partners: Male     Birth control/protection: OCP     Comment: lifetime parnters: 13; current partner 2017   Other Topics Concern   • Not on file   Social History Narrative    Protestant: no preference    Accepts blood products        Exercise: 1-2x/week    Calcium: 2-3 servings of cheese daily     Social Determinants of Health     Financial Resource Strain: Not on file   Food Insecurity: Not on file   Transportation Needs: Not on file   Physical Activity: Not on file   Stress: Not on file Social Connections: Not on file   Intimate Partner Violence: Not on file   Housing Stability: Not on file       Objective     Vitals:    01/24/23 1124   BP: 100/80   Pulse: 78   Resp: 18   Temp: 98 7 °F (37 1 °C)   SpO2: 97%     Wt Readings from Last 3 Encounters:   01/24/23 100 kg (221 lb 8 oz)   02/24/22 93 2 kg (205 lb 6 4 oz)   02/11/22 93 4 kg (206 lb)       Physical Exam  Constitutional:       General: She is not in acute distress  Appearance: Normal appearance  She is not ill-appearing  HENT:      Right Ear: Tympanic membrane, ear canal and external ear normal  There is no impacted cerumen  Left Ear: Tympanic membrane, ear canal and external ear normal  There is no impacted cerumen  Mouth/Throat:      Mouth: Mucous membranes are moist       Pharynx: No oropharyngeal exudate or posterior oropharyngeal erythema  Eyes:      General:         Right eye: No discharge  Left eye: No discharge  Extraocular Movements: Extraocular movements intact  Conjunctiva/sclera: Conjunctivae normal       Pupils: Pupils are equal, round, and reactive to light  Cardiovascular:      Rate and Rhythm: Normal rate and regular rhythm  Heart sounds: Normal heart sounds  No murmur heard  Pulmonary:      Effort: Pulmonary effort is normal  No respiratory distress  Breath sounds: Normal breath sounds  No wheezing, rhonchi or rales  Lymphadenopathy:      Cervical: No cervical adenopathy  Skin:     Findings: Lesion present  Comments: Patient with 2 small red nontender papules on right anterior neck approximately 2 to 3 mm in diameter  There is no discharge from this area  Neurological:      Mental Status: She is alert           Pertinent Laboratory/Diagnostic Studies:  Lab Results   Component Value Date    BUN 9 01/19/2022    CREATININE 0 68 01/19/2022    CALCIUM 9 4 01/19/2022    K 4 0 01/19/2022    CO2 28 01/19/2022     01/19/2022     Lab Results   Component Value Date ALT 20 01/19/2022    AST 15 01/19/2022    ALKPHOS 66 01/19/2022       Lab Results   Component Value Date    WBC 7 08 01/19/2022    HGB 14 2 01/19/2022    HCT 42 5 01/19/2022    MCV 94 01/19/2022     01/19/2022       Lab Results   Component Value Date    TSH 1 12 02/13/2020       No results found for: CHOL  Lab Results   Component Value Date    TRIG 124 01/19/2022     Lab Results   Component Value Date    HDL 51 01/19/2022     Lab Results   Component Value Date    LDLCALC 131 (H) 01/19/2022     Lab Results   Component Value Date    HGBA1C 5 2 05/18/2021       Results for orders placed or performed during the hospital encounter of 02/15/22   Tissue Exam   Result Value Ref Range    Case Report       Surgical Pathology Report                         Case: [id number]                                   Authorizing Provider:  QUE Rivas     Collected:           02/15/2022 0919              Ordering Location:     [address] [place] [address] Received:            02/15/2022 [address] Tee                                                                        Pathologist:           Kash Rodriguez MD                                                     Specimen:    Breast, Left, US BX Left breast 1100 10cmfn 3 passes 12g isidra                           Final Diagnosis       A  Breast, Left, left breast ultrasound biopsy 11:00 o'clock 10 cm from nipple:  - Fibroadenoma  - Negative for atypia, in-situ or invasive carcinoma  Additional Information       All reported additional testing was performed with appropriately reactive controls    These tests were developed and their performance characteristics determined by [facility] or appropriate performing facility, though some tests may be performed on tissues which have not been validated for performance characteristics (such as staining performed on alcohol exposed cell blocks and decalcified tissues)  Results should be interpreted with caution and in the context of the patients’ clinical condition  These tests may not be cleared or approved by the [place]  Food and Drug Administration, though the FDA has determined that such clearance or approval is not necessary  These tests are used for clinical purposes and they should not be regarded as investigational or for research  This laboratory has been approved by [place] [id number], designated as a high-complexity laboratory and is qualified to perform these tests  Interpretation performed at [facility], [address] [place] [address]  Gross Description          A  The specimen is received in formalin, labeled with the patient's name and hospital number, and is designated "left breast ultrasound biopsy 11:00 o'clock 10 cm from nipple”  The specimen consists of 3 fibrofatty tissue cores, each measuring 0 1-0 2 cm in diameter, with lengths ranging from 1 2-1 8 cm each  Entirely submitted  Two cassettes  2 cores in cassette 1; 1 core in cassette 2  Between sponges  Note: The estimated total formalin fixation time based upon information provided by the submitting clinician and the standard processing schedule is 11 25 hours  MCrites        Clinical Information       Hypoechoic area  There is a 6 mm x 9 mm oval mass seen in the inner central region of the left breast at 11 o'clock in the posterior depth  No orders of the defined types were placed in this encounter  ALLERGIES:  Allergies   Allergen Reactions   • Penicillins Itching   • Amoxicillin Rash     Category:  Allergy;        Current Medications     Current Outpatient Medications   Medication Sig Dispense Refill   • acetaminophen (TYLENOL) 325 mg tablet Take 2 tablets (650 mg total) by mouth every 6 (six) hours as needed for mild pain or headaches 30 tablet 0   • albuterol (Proventil HFA) 90 mcg/act inhaler Inhale 2 puffs every 6 (six) hours as needed (cough, chest tightness, shortness of breath) 18 g 2   • doxycycline hyclate (VIBRAMYCIN) 100 mg capsule      • famotidine (PEPCID) 40 MG tablet Take 1 tablet (40 mg total) by mouth daily 30 tablet 0   • mupirocin (BACTROBAN) 2 % ointment Apply topically 3 (three) times a day 15 g 0   • norethindrone (Ortho Micronor) 0 35 MG tablet Take 1 tablet (0 35 mg total) by mouth daily 84 tablet 3   • valACYclovir (VALTREX) 1,000 mg tablet Take 1 tablet (1,000 mg total) by mouth daily 90 tablet 3   • fluticasone-salmeterol (Wixela Inhub) 250-50 mcg/dose inhaler Inhale 1 puff 2 (two) times a day Rinse mouth after use  180 blister 3   • triamcinolone (KENALOG) 0 1 % ointment APPLY TO AFFECTED AREA TWICE A DAY FOR 7 DAYS (Patient not taking: Reported on 1/24/2023) 30 g 0     No current facility-administered medications for this visit           Health Maintenance     Health Maintenance   Topic Date Due   • Hepatitis C Screening  Never done   • Pneumococcal Vaccine: Pediatrics (0 to 5 Years) and At-Risk Patients (6 to 59 Years) (1 - PCV) Never done   • BMI: Followup Plan  Never done   • COVID-19 Vaccine (3 - Booster for Pfizer series) 06/25/2021   • Annual Physical  01/19/2023   • Breast Cancer Screening: Mammogram  02/06/2023   • Influenza Vaccine (1) 06/30/2023 (Originally 9/1/2022)   • Depression Screening  01/24/2024   • BMI: Adult  01/24/2024   • Cervical Cancer Screening  04/20/2025   • DTaP,Tdap,and Td Vaccines (2 - Td or Tdap) 09/03/2030   • HIV Screening  Completed   • HIB Vaccine  Aged Out   • IPV Vaccine  Aged Out   • Hepatitis A Vaccine  Aged Out   • Meningococcal ACWY Vaccine  Aged Out   • HPV Vaccine  Aged Out     Immunization History   Administered Date(s) Administered   • COVID-19 PFIZER VACCINE 0 3 ML IM 04/09/2021, 04/30/2021   • Influenza, injectable, quadrivalent, preservative free 0 5 mL 04/20/2020, 11/12/2020   • Rho (D) Immune Globulin 03/10/2020, 08/11/2020, 10/03/2020   • Tdap 74/13/2706       Roybn Stewart MD  I spent 30 minutes with this patient which greater 50% was spent counseling or reviewing chart

## 2023-01-24 NOTE — ASSESSMENT & PLAN NOTE
Folliculitis  Patient was suspected mild case of folliculitis on the neck  She was given Bactroban ointment to apply 3 times daily to the affected area

## 2023-02-16 DIAGNOSIS — R05.9 COUGH, UNSPECIFIED TYPE: ICD-10-CM

## 2023-02-16 RX ORDER — FAMOTIDINE 40 MG/1
TABLET, FILM COATED ORAL
Qty: 90 TABLET | Refills: 1 | Status: SHIPPED | OUTPATIENT
Start: 2023-02-16

## 2023-03-14 ENCOUNTER — ANNUAL EXAM (OUTPATIENT)
Dept: OBGYN CLINIC | Facility: CLINIC | Age: 43
End: 2023-03-14

## 2023-03-14 VITALS
BODY MASS INDEX: 36.25 KG/M2 | WEIGHT: 217.6 LBS | HEIGHT: 65 IN | DIASTOLIC BLOOD PRESSURE: 66 MMHG | SYSTOLIC BLOOD PRESSURE: 110 MMHG

## 2023-03-14 DIAGNOSIS — Z11.51 SCREENING FOR HPV (HUMAN PAPILLOMAVIRUS): ICD-10-CM

## 2023-03-14 DIAGNOSIS — Z12.4 ENCOUNTER FOR PAPANICOLAOU SMEAR FOR CERVICAL CANCER SCREENING: ICD-10-CM

## 2023-03-14 DIAGNOSIS — Z30.41 ORAL CONTRACEPTIVE PILL SURVEILLANCE: ICD-10-CM

## 2023-03-14 DIAGNOSIS — Z12.31 ENCOUNTER FOR SCREENING MAMMOGRAM FOR BREAST CANCER: ICD-10-CM

## 2023-03-14 DIAGNOSIS — R10.2 PELVIC PAIN IN FEMALE: ICD-10-CM

## 2023-03-14 DIAGNOSIS — Z01.419 ENCOUNTER FOR GYNECOLOGICAL EXAMINATION WITHOUT ABNORMAL FINDING: Primary | ICD-10-CM

## 2023-03-14 RX ORDER — ACETAMINOPHEN AND CODEINE PHOSPHATE 120; 12 MG/5ML; MG/5ML
1 SOLUTION ORAL DAILY
Qty: 84 TABLET | Refills: 4 | Status: SHIPPED | OUTPATIENT
Start: 2023-03-14

## 2023-03-14 NOTE — PROGRESS NOTES
Assessment / Plan    1  Encounter for gynecological examination without abnormal finding  Normal well woman exam  Pap with HPV updated    2  Encounter for Papanicolaou smear for cervical cancer screening    - Liquid-based pap, screening    3  Screening for HPV (human papillomavirus)    - Liquid-based pap, screening    4  Encounter for screening mammogram for breast cancer  Has order in system, will be scheduling     - Mammo screening bilateral w 3d & cad; Future    5  Pelvic pain in female  Not reproduced on exam today  Hx of uterine fibroids  Check pelvic US     - US pelvis complete w transvaginal; Future    6  Oral contraceptive pill surveillance  Refills sent    - norethindrone (Ortho Micronor) 0 35 MG tablet; Take 1 tablet (0 35 mg total) by mouth daily  Dispense: 84 tablet; Refill: 4        Subjective      Chilango Mendez is a 43 y o  female who presents for her annual gynecologic exam      Complaining of pelvi pain, cyclic, about a week before her period, suprapubic  Takes tylenol and rests and it will resolve  Concerned due to her hx of uterine fibroids  2020 pap/hpv negative  Pap Hx abn  2020 G/C negative  STD hx: HSV type 2, takes valtrex episodically  2022 Mammo, left mass, birads 0; left dx 4A  2/15/22 US guided breast biopsy fibroadenoma  To return to yearly screening  Has to reschedule her mammogram-- missed her 23 appt due to weather  Sexually active    Periods are regular, "heavy", changes every 3-4 hours    Current contraception: oral progesterone-only contraceptive  History of abnormal Pap smear: yes -   Family history of breast,uterine, ovarian or colon cancer: no      Menstrual History:  OB History        4    Para   3    Term   2       1    AB   1    Living   2       SAB        IAB   1    Ectopic        Multiple   0    Live Births   2           Obstetric Comments   Menarche: 12  Cycles are monthly, on progesterone only pill, heavy (has to change every 3-4 hours)   x 1                Patient's last menstrual period was 2023 (approximate)  The following portions of the patient's history were reviewed and updated as appropriate: allergies, current medications, past family history, past medical history, past social history, past surgical history and problem list     Review of Systems      Review of Systems   Constitutional: Negative for chills and fever  Respiratory: Negative for cough and shortness of breath  Gastrointestinal: Negative for abdominal distention, abdominal pain, blood in stool, constipation, diarrhea, nausea and vomiting  Genitourinary: Positive for pelvic pain (monthly, suprapubic, crampy)  Negative for difficulty urinating, dysuria, frequency, genital sores, hematuria, menstrual problem, urgency, vaginal bleeding and vaginal discharge  Musculoskeletal: Negative for arthralgias and myalgias  Breasts:  Negative for skin changes, dimpling, asymmetry, nipple discharge, redness, tenderness or palpable masses    Objective      /66 (BP Location: Left arm, Patient Position: Sitting, Cuff Size: Large)   Ht 5' 5" (1 651 m)   Wt 98 7 kg (217 lb 9 6 oz)   LMP 2023 (Approximate)   BMI 36 21 kg/m²   Physical Exam  Constitutional:       General: She is not in acute distress  Appearance: Normal appearance  She is well-developed  She is not ill-appearing or diaphoretic  Comments: bmi 36 2   HENT:      Head: Normocephalic and atraumatic  Eyes:      Pupils: Pupils are equal, round, and reactive to light  Neck:      Thyroid: No thyromegaly  Pulmonary:      Effort: Pulmonary effort is normal    Chest:   Breasts:     Breasts are symmetrical       Right: No inverted nipple, mass, nipple discharge, skin change or tenderness  Left: No inverted nipple, mass, nipple discharge, skin change or tenderness  Abdominal:      General: There is no distension  Palpations: Abdomen is soft  There is no mass  Tenderness: There is no abdominal tenderness  There is no guarding or rebound  Genitourinary:     General: Normal vulva  Exam position: Lithotomy position  Labia:         Right: No rash, tenderness, lesion or injury  Left: No rash, tenderness, lesion or injury  Vagina: No signs of injury and foreign body  No vaginal discharge, erythema, tenderness or bleeding  Cervix: No cervical motion tenderness, discharge or friability  Uterus: Not enlarged and not tender  Adnexa:         Right: No mass or tenderness  Left: No mass or tenderness  Musculoskeletal:      Cervical back: Neck supple  Lymphadenopathy:      Cervical: No cervical adenopathy  Upper Body:      Right upper body: No supraclavicular adenopathy  Left upper body: No supraclavicular adenopathy  Skin:     General: Skin is warm and dry  Neurological:      General: No focal deficit present  Mental Status: She is alert and oriented to person, place, and time  Psychiatric:         Mood and Affect: Mood normal          Behavior: Behavior normal          Thought Content:  Thought content normal          Judgment: Judgment normal

## 2023-03-14 NOTE — PATIENT INSTRUCTIONS
Weight Management   WHAT YOU NEED TO KNOW:   Being overweight increases your risk of health conditions such as heart disease, high blood pressure, type 2 diabetes, and certain types of cancer  It can also increase your risk for osteoarthritis, sleep apnea, and other respiratory problems  Aim for a slow, steady weight loss  Even a small amount of weight loss can lower your risk of health problems  DISCHARGE INSTRUCTIONS:   How to lose weight safely:  A safe and healthy way to lose weight is to eat fewer calories and get regular exercise  You can lose up about 1 pound a week by decreasing the number of calories you eat by 500 calories each day  You can decrease calories by eating smaller portion sizes or by cutting out high-calorie foods  Read labels to find out how many calories are in the foods you eat  You can also burn calories with exercise such as walking, swimming, or biking  You will be more likely to keep weight off if you make these changes part of your lifestyle  Exercise at least 30 minutes per day on most days of the week  You can also fit in more physical activity by taking the stairs instead of the elevator or parking farther away from stores  Ask your healthcare provider about the best exercise plan for you  Healthy meal plan for weight management:  A healthy meal plan includes a variety of foods, contains fewer calories, and helps you stay healthy  A healthy meal plan includes the following:     Eat whole-grain foods more often  A healthy meal plan should contain fiber  Fiber is the part of grains, fruits, and vegetables that is not broken down by your body  Whole-grain foods are healthy and provide extra fiber in your diet  Some examples of whole-grain foods are whole-wheat breads and pastas, oatmeal, brown rice, and bulgur  Eat a variety of vegetables every day  Include dark, leafy greens such as spinach, kale, michael greens, and mustard greens   Eat yellow and orange vegetables such as carrots, sweet potatoes, and winter squash  Eat a variety of fruits every day  Choose fresh or canned fruit (canned in its own juice or light syrup) instead of juice  Fruit juice has very little or no fiber  Eat low-fat dairy foods  Drink fat-free (skim) milk or 1% milk  Eat fat-free yogurt and low-fat cottage cheese  Try low-fat cheeses such as mozzarella and other reduced-fat cheeses  Choose meat and other protein foods that are low in fat  Choose beans or other legumes such as split peas or lentils  Choose fish, skinless poultry (chicken or turkey), or lean cuts of red meat (beef or pork)  Before you cook meat or poultry, cut off any visible fat  Use less fat and oil  Try baking foods instead of frying them  Add less fat, such as margarine, sour cream, regular salad dressing, and mayonnaise to foods  Eat fewer high-fat foods  Some examples of high-fat foods include french fries, doughnuts, ice cream, and cakes  Eat fewer sweets  Limit foods and drinks that are high in sugar  This includes candy, cookies, regular soda, and sweetened drinks  Ways to decrease calories:   Eat smaller portions  Use a small plate with smaller servings  Do not eat second helpings  When you eat at a restaurant, ask for a box and place half of your meal in the box before you eat  Share an entrée with someone else  Replace high-calorie snacks with healthy, low-calorie snacks  Choose fresh fruit, vegetables, fat-free rice cakes, or air-popped popcorn instead of potato chips, nuts, or chocolate  Choose water or calorie-free drinks instead of soda or sweetened drinks  Do not shop for groceries when you are hungry  You may be more likely to make unhealthy food choices  Take a grocery list of healthy foods and shop after you have eaten  Eat regular meals  Do not skip meals  Skipping meals can lead to overeating later in the day  This can make it harder for you to lose weight   Eat a healthy snack in place of a meal if you do not have time to eat a regular meal  Talk with a dietitian to help you create a meal plan and schedule that is right for you  Other things to consider as you try to lose weight:   Be aware of situations that may give you the urge to overeat, such as eating while watching television  Find ways to avoid these situations  For example, read a book, go for a walk, or do crafts  Meet with a weight loss support group or friends who are also trying to lose weight  This may help you stay motivated to continue working on your weight loss goals  © Copyright [employer] 2022 Information is for End User's use only and may not be sold, redistributed or otherwise used for commercial purposes  The above information is an  only  It is not intended as medical advice for individual conditions or treatments  Talk to your doctor, nurse or pharmacist before following any medical regimen to see if it is safe and effective for you

## 2023-03-15 LAB
HPV HR 12 DNA CVX QL NAA+PROBE: NEGATIVE
HPV16 DNA CVX QL NAA+PROBE: NEGATIVE
HPV18 DNA CVX QL NAA+PROBE: NEGATIVE

## 2023-03-21 ENCOUNTER — HOSPITAL ENCOUNTER (OUTPATIENT)
Dept: ULTRASOUND IMAGING | Facility: HOSPITAL | Age: 43
Discharge: HOME | End: 2023-03-21
Attending: NURSE PRACTITIONER

## 2023-03-21 DIAGNOSIS — R10.2 PELVIC PAIN IN FEMALE: ICD-10-CM

## 2023-03-21 LAB
LAB AP GYN PRIMARY INTERPRETATION: NORMAL
Lab: NORMAL

## 2023-03-25 PROBLEM — R05.9 COUGH: Status: RESOLVED | Noted: 2023-01-24 | Resolved: 2023-03-25

## 2023-03-31 NOTE — RESULT ENCOUNTER NOTE
Pelvic US in 44 yo w/ c/o pelvic pain and h/o uterine fibroid:  Findings reveal a slightly smaller uterine fibroid of 3 cm  Otherwise normal findings with no ovarian cysts/masses

## 2023-05-16 ENCOUNTER — HOSPITAL ENCOUNTER (OUTPATIENT)
Dept: MAMMOGRAPHY | Facility: IMAGING CENTER | Age: 43
Discharge: HOME | End: 2023-05-16

## 2023-05-16 VITALS — WEIGHT: 215 LBS | HEIGHT: 65 IN | BODY MASS INDEX: 35.82 KG/M2

## 2023-05-16 DIAGNOSIS — Z12.31 ENCOUNTER FOR SCREENING MAMMOGRAM FOR BREAST CANCER: ICD-10-CM

## 2023-07-07 ENCOUNTER — OFFICE VISIT (OUTPATIENT)
Dept: FAMILY MEDICINE CLINIC | Facility: CLINIC | Age: 43
End: 2023-07-07
Payer: COMMERCIAL

## 2023-07-07 VITALS
BODY MASS INDEX: 34.74 KG/M2 | WEIGHT: 208.5 LBS | RESPIRATION RATE: 18 BRPM | DIASTOLIC BLOOD PRESSURE: 59 MMHG | HEIGHT: 65 IN | TEMPERATURE: 98 F | HEART RATE: 80 BPM | SYSTOLIC BLOOD PRESSURE: 112 MMHG | OXYGEN SATURATION: 98 %

## 2023-07-07 DIAGNOSIS — R39.9 UTI SYMPTOMS: Primary | ICD-10-CM

## 2023-07-07 LAB
SL AMB  POCT GLUCOSE, UA: ABNORMAL
SL AMB LEUKOCYTE ESTERASE,UA: ABNORMAL
SL AMB POCT BILIRUBIN,UA: ABNORMAL
SL AMB POCT BLOOD,UA: ABNORMAL
SL AMB POCT CLARITY,UA: CLEAR
SL AMB POCT COLOR,UA: YELLOW
SL AMB POCT KETONES,UA: ABNORMAL
SL AMB POCT NITRITE,UA: ABNORMAL
SL AMB POCT PH,UA: 5
SL AMB POCT SPECIFIC GRAVITY,UA: 1
SL AMB POCT URINE PROTEIN: ABNORMAL
SL AMB POCT UROBILINOGEN: ABNORMAL

## 2023-07-07 PROCEDURE — 81002 URINALYSIS NONAUTO W/O SCOPE: CPT | Performed by: NURSE PRACTITIONER

## 2023-07-07 PROCEDURE — 99213 OFFICE O/P EST LOW 20 MIN: CPT | Performed by: NURSE PRACTITIONER

## 2023-07-07 PROCEDURE — 87086 URINE CULTURE/COLONY COUNT: CPT | Performed by: NURSE PRACTITIONER

## 2023-07-07 PROCEDURE — 87147 CULTURE TYPE IMMUNOLOGIC: CPT | Performed by: NURSE PRACTITIONER

## 2023-07-07 RX ORDER — SULFAMETHOXAZOLE AND TRIMETHOPRIM 800; 160 MG/1; MG/1
1 TABLET ORAL EVERY 12 HOURS SCHEDULED
Qty: 6 TABLET | Refills: 0 | Status: SHIPPED | OUTPATIENT
Start: 2023-07-07 | End: 2023-07-09 | Stop reason: ALTCHOICE

## 2023-07-07 NOTE — PROGRESS NOTES
FAMILY PRACTICE OFFICE VISIT       NAME: Natanael Domínguez  AGE: 43 y.o. SEX: female       : 1980        MRN: [id number]    Assessment and Plan   1. UTI symptoms  -     POCT urine dip  -     Urine culture  -     sulfamethoxazole-trimethoprim (BACTRIM DS) 800-160 mg per tablet; Take 1 tablet by mouth every 12 (twelve) hours for 3 days       Unclear if symptoms are UTI or vaginitis. In office urine dip, with leuks, otherwise clear. Will start Bactrim DS 1 tablet twice daily for 3 days, while culture is pending. If culture is negative and symptoms not resolved with Bactrim will refer to gyn for evaluation. Call for any worsening or persisting symptoms. Chief Complaint     Chief Complaint   Patient presents with   • Possible UTI     Burning , frequency urine , pressure x 3-4 days        History of Present Illness       Natanael Domínguez is a 43year old female presenting today for possible UTI. Burning with urination, and pressure over last 3-4 days. Drinking a lot of water recently, so is urinating frequently. No fevers or chills. No nausea or vomiting. No back pain--slight discomfort. No hematuria. No urine odor. No frequent UTI history. Has had UTI's in the past on occasion. .     No constipation. Intermittent, but more frequent vaginal yeast infections, itching intermittently. Has treated with monistat and diflucan in the past.   Diflucan 1 month ago. Monistat longer than 1 month ago. Has reached out to gyn office, they recommended she PCP first.     Symptoms are annoying, but not terrible. Not on antibiotics recently. Review of Systems   Review of Systems   Constitutional: Negative for chills, diaphoresis, fatigue and fever. HENT: Negative. Gastrointestinal: Negative for nausea and vomiting. Genitourinary: Positive for dysuria, flank pain (discomfort, states "not pain"), frequency and pelvic pain (pressure).  Negative for hematuria and vaginal discharge. I have reviewed the patient's medical history in detail; there are no changes to the history as noted in the electronic medical record. Objective     Vitals:    07/07/23 0956   BP: 112/59   Pulse: 80   Resp: 18   Temp: 98 °F (36.7 °C)   SpO2: 98%   Weight: 94.6 kg (208 lb 8 oz)   Height: 5' 5" (1.651 m)     Wt Readings from Last 3 Encounters:   07/07/23 94.6 kg (208 lb 8 oz)   05/16/23 97.5 kg (215 lb)   03/14/23 98.7 kg (217 lb 9.6 oz)     Physical Exam  Vitals and nursing note reviewed. Constitutional:       General: She is not in acute distress. Appearance: Normal appearance. She is not ill-appearing. Cardiovascular:      Rate and Rhythm: Normal rate and regular rhythm. Heart sounds: No murmur heard. Pulmonary:      Effort: Pulmonary effort is normal.      Breath sounds: Normal breath sounds. Abdominal:      General: Bowel sounds are normal. There is no distension. Palpations: Abdomen is soft. Tenderness: There is no abdominal tenderness. Musculoskeletal:      Right lower leg: No edema. Left lower leg: No edema. Skin:     General: Skin is warm and dry. Findings: No rash. Neurological:      Mental Status: She is alert. Psychiatric:         Mood and Affect: Mood normal.            ALLERGIES:  Allergies   Allergen Reactions   • Penicillins Itching   • Amoxicillin Rash     Category:  Allergy;        Current Medications     Current Outpatient Medications   Medication Sig Dispense Refill   • albuterol (Proventil HFA) 90 mcg/act inhaler Inhale 2 puffs every 6 (six) hours as needed (cough, chest tightness, shortness of breath) 18 g 2   • mupirocin (BACTROBAN) 2 % ointment Apply topically 3 (three) times a day 15 g 0   • norethindrone (Ortho Micronor) 0.35 MG tablet Take 1 tablet (0.35 mg total) by mouth daily 84 tablet 4   • sulfamethoxazole-trimethoprim (BACTRIM DS) 800-160 mg per tablet Take 1 tablet by mouth every 12 (twelve) hours for 3 days 6 tablet 0   • valACYclovir (VALTREX) 1,000 mg tablet Take 1 tablet (1,000 mg total) by mouth daily 90 tablet 3     No current facility-administered medications for this visit.          Health Maintenance     Health Maintenance   Topic Date Due   • Hepatitis C Screening  Never done   • Pneumococcal Vaccine: Pediatrics (0 to 5 Years) and At-Risk Patients (6 to 59 Years) (1 - PCV) Never done   • BMI: Followup Plan  Never done   • COVID-19 Vaccine (3 - Pfizer series) 06/25/2021   • Annual Physical  01/19/2023   • Influenza Vaccine (1) 09/01/2023   • Depression Screening  01/24/2024   • Breast Cancer Screening: Mammogram  05/16/2024   • BMI: Adult  07/07/2024   • Cervical Cancer Screening  03/14/2028   • DTaP,Tdap,and Td Vaccines (2 - Td or Tdap) 09/03/2030   • HIV Screening  Completed   • HIB Vaccine  Aged Out   • IPV Vaccine  Aged Out   • Hepatitis A Vaccine  Aged Out   • Meningococcal ACWY Vaccine  Aged Out   • HPV Vaccine  Aged Out     Immunization History   Administered Date(s) Administered   • COVID-19 PFIZER VACCINE 0.3 ML IM 04/09/2021, 04/30/2021   • Influenza, injectable, quadrivalent, preservative free 0.5 mL 04/20/2020, 11/12/2020   • Rho (D) Immune Globulin 03/10/2020, 08/11/2020, 10/03/2020   • Tdap 09/03/2020       QUE Thomas

## 2023-07-09 DIAGNOSIS — N30.00 ACUTE CYSTITIS WITHOUT HEMATURIA: Primary | ICD-10-CM

## 2023-07-09 LAB
BACTERIA UR CULT: ABNORMAL
BACTERIA UR CULT: ABNORMAL

## 2023-07-09 RX ORDER — CEFDINIR 300 MG/1
300 CAPSULE ORAL EVERY 12 HOURS SCHEDULED
Qty: 10 CAPSULE | Refills: 0 | Status: SHIPPED | OUTPATIENT
Start: 2023-07-09 | End: 2023-07-14

## 2023-07-10 ENCOUNTER — TELEPHONE (OUTPATIENT)
Dept: FAMILY MEDICINE CLINIC | Facility: CLINIC | Age: 43
End: 2023-07-10

## 2023-07-10 NOTE — TELEPHONE ENCOUNTER
----- Message from Elvi Carrion sent at 7/9/2023  9:04 PM EDT -----  Please let Imelda Khoury know urine culture does show small amount of bacteria, that would not be responsive to Bactrim, the antibiotic you are taking. Please start a new antibiotic--cefdinir 1 tablet twice daily for 5 days. Please let me know if you have any persistent symptoms after finishing this antibiotic.

## 2024-02-20 DIAGNOSIS — Z30.41 ORAL CONTRACEPTIVE PILL SURVEILLANCE: ICD-10-CM

## 2024-02-20 NOTE — TELEPHONE ENCOUNTER
Patient is calling tomorrow AM to set up an appointment.    Reason for call:   [x] Refill   [] Prior Auth  [] Other:     Office:   [x] PCP/Provider - Angela Núñez   [] Specialty/Provider -     Medication: Norethindrone    Dose/Frequency: 0.35 mg tab once daily    Quantity: 84 + 1 refill    Pharmacy: [place] [facility] [id number]  VERONICA Sandoval    Does the patient have enough for 3 days?   [x] Yes   [] No - Send as HP to POD

## 2024-02-21 RX ORDER — ACETAMINOPHEN AND CODEINE PHOSPHATE 120; 12 MG/5ML; MG/5ML
1 SOLUTION ORAL DAILY
Qty: 84 TABLET | Refills: 0 | Status: SHIPPED | OUTPATIENT
Start: 2024-02-21 | End: 2024-05-15

## 2024-05-14 ENCOUNTER — ANNUAL EXAM (OUTPATIENT)
Dept: OBGYN CLINIC | Facility: CLINIC | Age: 44
End: 2024-05-14
Payer: COMMERCIAL

## 2024-05-14 VITALS
HEIGHT: 65 IN | WEIGHT: 218 LBS | SYSTOLIC BLOOD PRESSURE: 102 MMHG | DIASTOLIC BLOOD PRESSURE: 62 MMHG | BODY MASS INDEX: 36.32 KG/M2 | HEART RATE: 70 BPM

## 2024-05-14 DIAGNOSIS — Z12.31 ENCOUNTER FOR SCREENING MAMMOGRAM FOR BREAST CANCER: ICD-10-CM

## 2024-05-14 DIAGNOSIS — Z01.419 ENCOUNTER FOR GYNECOLOGICAL EXAMINATION WITHOUT ABNORMAL FINDING: Primary | ICD-10-CM

## 2024-05-14 DIAGNOSIS — B00.9 HERPES SIMPLEX TYPE 2 (HSV-2) INFECTION AFFECTING PREGNANCY, ANTEPARTUM, SECOND TRIMESTER: ICD-10-CM

## 2024-05-14 DIAGNOSIS — O98.512 HERPES SIMPLEX TYPE 2 (HSV-2) INFECTION AFFECTING PREGNANCY, ANTEPARTUM, SECOND TRIMESTER: ICD-10-CM

## 2024-05-14 DIAGNOSIS — Z30.41 ORAL CONTRACEPTIVE PILL SURVEILLANCE: ICD-10-CM

## 2024-05-14 PROCEDURE — S0612 ANNUAL GYNECOLOGICAL EXAMINA: HCPCS | Performed by: NURSE PRACTITIONER

## 2024-05-14 RX ORDER — VALACYCLOVIR HYDROCHLORIDE 1 G/1
1000 TABLET, FILM COATED ORAL DAILY
Qty: 30 TABLET | Refills: 3 | Status: SHIPPED | OUTPATIENT
Start: 2024-05-14 | End: 2025-05-14

## 2024-05-14 RX ORDER — ACETAMINOPHEN AND CODEINE PHOSPHATE 120; 12 MG/5ML; MG/5ML
1 SOLUTION ORAL DAILY
Qty: 84 TABLET | Refills: 4 | Status: SHIPPED | OUTPATIENT
Start: 2024-05-14 | End: 2024-08-06

## 2024-05-14 NOTE — PROGRESS NOTES
Assessment / Plan    1. Encounter for gynecological examination without abnormal finding  Normal well woman exam  3/2023 pap/hpv negative.  Repeat .    2. Encounter for screening mammogram for breast cancer  Order provided for update    - Mammo screening bilateral w 3d & cad; Future    3. Oral contraceptive pill surveillance  Refills sent    - norethindrone (Ortho Micronor) 0.35 MG tablet; Take 1 tablet (0.35 mg total) by mouth daily  Dispense: 84 tablet; Refill: 4    4. Herpes simplex type 2 (HSV-2) infection affecting pregnancy, antepartum, second trimester  Refills sent    - valACYclovir (VALTREX) 1,000 mg tablet; Take 1 tablet (1,000 mg total) by mouth daily  Dispense: 30 tablet; Refill: 3        Subjective      Lise Taylor is a 43 y.o. female who presents for her annual gynecologic exam.    3/2023 pap/hpv negative  Pap hx: abn  STD hx: HSV 2, takes valtrex episodically.   2020 G/C negative  2023 mammo benign  Sexually active: yes, same partner  BC method:  progesterone only pill   Calcium intake: eats some dairy; given guidelines  Exercise: walking 2-3x per week  Safety: feels safe    Periods are regular   Current contraception: oral progesterone-only contraceptive  History of abnormal Pap smear: yes -   Family history of breast,uterine, ovarian or colon cancer: no      Menstrual History:  OB History          4    Para   3    Term   2       1    AB   1    Living   2         SAB        IAB   1    Ectopic        Multiple   0    Live Births   2           Obstetric Comments   Menarche: 12  Cycles are monthly, on progesterone only pill, heavy (has to change every 3-4 hours)   x 1                    No LMP recorded. (Menstrual status: Birth Control).       The following portions of the patient's history were reviewed and updated as appropriate: allergies, current medications, past family history, past medical history, past social history, past surgical history, and problem  "list.    Review of Systems      Review of Systems   Constitutional:  Negative for chills and fever.   Respiratory:  Negative for cough and shortness of breath.    Gastrointestinal:  Negative for abdominal distention, abdominal pain, blood in stool, constipation, diarrhea, nausea and vomiting.   Genitourinary:  Negative for difficulty urinating, dysuria, frequency, genital sores, hematuria, menstrual problem, pelvic pain, urgency, vaginal bleeding and vaginal discharge.   Musculoskeletal:  Negative for arthralgias and myalgias.     Breasts:  Negative for skin changes, dimpling, asymmetry, nipple discharge, redness, tenderness or palpable masses    Objective      /62   Pulse 70   Ht 5' 5\" (1.651 m)   Wt 98.9 kg (218 lb)   BMI 36.28 kg/m²   Physical Exam  Constitutional:       General: She is not in acute distress.     Appearance: Normal appearance. She is well-developed. She is not ill-appearing or diaphoretic.      Comments: Bmi 36.3   HENT:      Head: Normocephalic and atraumatic.   Eyes:      Pupils: Pupils are equal, round, and reactive to light.   Neck:      Thyroid: No thyromegaly.   Pulmonary:      Effort: Pulmonary effort is normal.   Chest:   Breasts:     Breasts are symmetrical.      Right: No inverted nipple, mass, nipple discharge, skin change or tenderness.      Left: No inverted nipple, mass, nipple discharge, skin change or tenderness.   Abdominal:      General: There is no distension.      Palpations: Abdomen is soft. There is no mass.      Tenderness: There is no abdominal tenderness. There is no guarding or rebound.   Genitourinary:     General: Normal vulva.      Exam position: Lithotomy position.      Labia:         Right: No rash, tenderness, lesion or injury.         Left: No rash, tenderness, lesion or injury.       Vagina: No signs of injury and foreign body. No vaginal discharge, erythema, tenderness or bleeding.      Cervix: No cervical motion tenderness, discharge or friability.    "   Uterus: Not enlarged and not tender.       Adnexa:         Right: No mass or tenderness.          Left: No mass or tenderness.     Musculoskeletal:      Cervical back: Neck supple.   Lymphadenopathy:      Cervical: No cervical adenopathy.      Upper Body:      Right upper body: No supraclavicular adenopathy.      Left upper body: No supraclavicular adenopathy.   Skin:     General: Skin is warm and dry.   Neurological:      General: No focal deficit present.      Mental Status: She is alert and oriented to person, place, and time.   Psychiatric:         Mood and Affect: Mood normal.         Behavior: Behavior normal.         Thought Content: Thought content normal.         Judgment: Judgment normal.

## 2024-05-14 NOTE — PATIENT INSTRUCTIONS
Weight Management   WHAT YOU NEED TO KNOW:   Being overweight increases your risk of health conditions such as heart disease, high blood pressure, type 2 diabetes, and certain types of cancer. It can also increase your risk for osteoarthritis, sleep apnea, and other respiratory problems. Aim for a slow, steady weight loss. Even a small amount of weight loss can lower your risk of health problems.  DISCHARGE INSTRUCTIONS:   How to lose weight safely:  A safe and healthy way to lose weight is to eat fewer calories and get regular exercise.  You can lose up about 1 pound a week by decreasing the number of calories you eat by 500 calories each day. You can decrease calories by eating smaller portion sizes or by cutting out high-calorie foods. Read labels to find out how many calories are in the foods you eat.         You can also burn calories with exercise such as walking, swimming, or biking. You will be more likely to keep weight off if you make these changes part of your lifestyle. Exercise at least 30 minutes per day on most days of the week. You can also fit in more physical activity by taking the stairs instead of the elevator or parking farther away from stores. Ask your healthcare provider about the best exercise plan for you.       Healthy meal plan for weight management:  A healthy meal plan includes a variety of foods, contains fewer calories, and helps you stay healthy. A healthy meal plan includes the following:     Eat whole-grain foods more often.  A healthy meal plan should contain fiber. Fiber is the part of grains, fruits, and vegetables that is not broken down by your body. Whole-grain foods are healthy and provide extra fiber in your diet. Some examples of whole-grain foods are whole-wheat breads and pastas, oatmeal, brown rice, and bulgur.    Eat a variety of vegetables every day.  Include dark, leafy greens such as spinach, kale, michael greens, and mustard greens. Eat yellow and orange vegetables  such as carrots, sweet potatoes, and winter squash.     Eat a variety of fruits every day.  Choose fresh or canned fruit (canned in its own juice or light syrup) instead of juice. Fruit juice has very little or no fiber.    Eat low-fat dairy foods.  Drink fat-free (skim) milk or 1% milk. Eat fat-free yogurt and low-fat cottage cheese. Try low-fat cheeses such as mozzarella and other reduced-fat cheeses.    Choose meat and other protein foods that are low in fat.  Choose beans or other legumes such as split peas or lentils. Choose fish, skinless poultry (chicken or turkey), or lean cuts of red meat (beef or pork). Before you cook meat or poultry, cut off any visible fat.     Use less fat and oil.  Try baking foods instead of frying them. Add less fat, such as margarine, sour cream, regular salad dressing, and mayonnaise to foods. Eat fewer high-fat foods. Some examples of high-fat foods include french fries, doughnuts, ice cream, and cakes.    Eat fewer sweets.  Limit foods and drinks that are high in sugar. This includes candy, cookies, regular soda, and sweetened drinks.  Ways to decrease calories:   Eat smaller portions.     Use a small plate with smaller servings.    Do not eat second helpings.    When you eat at a restaurant, ask for a box and place half of your meal in the box before you eat.    Share an entrée with someone else.    Replace high-calorie snacks with healthy, low-calorie snacks.     Choose fresh fruit, vegetables, fat-free rice cakes, or air-popped popcorn instead of potato chips, nuts, or chocolate.    Choose water or calorie-free drinks instead of soda or sweetened drinks.    Do not shop for groceries when you are hungry.  You may be more likely to make unhealthy food choices. Take a grocery list of healthy foods and shop after you have eaten.    Eat regular meals. Do not skip meals. Skipping meals can lead to overeating later in the day. This can make it harder for you to lose weight. Eat a  healthy snack in place of a meal if you do not have time to eat a regular meal. Talk with a dietitian to help you create a meal plan and schedule that is right for you.    Other things to consider as you try to lose weight:   Be aware of situations that may give you the urge to overeat, such as eating while watching television. Find ways to avoid these situations. For example, read a book, go for a walk, or do crafts.    Meet with a weight loss support group or friends who are also trying to lose weight. This may help you stay motivated to continue working on your weight loss goals.    © Copyright [employer] 2023 Information is for End User's use only and may not be sold, redistributed or otherwise used for commercial purposes.  The above information is an  only. It is not intended as medical advice for individual conditions or treatments. Talk to your doctor, nurse or pharmacist before following any medical regimen to see if it is safe and effective for you.  Calcium and Osteoporosis   AMBULATORY CARE:   Why calcium is important for osteoporosis:  Calcium is important for osteoporosis because calcium helps build bone mass. Osteoporosis is a long-term medical condition that causes your body to break down more bone than it makes. Your bones become weak, brittle, and more likely to fracture.   Your calcium needs:   Women:      19 to 50 years: 1,000 mg    Over 50: 1,200 mg    Pregnant or breastfeeding, 19 years to 50 years: 1,000 mg    Men:      19 to 70: 1,000 mg    Over 70: 1,200 mg    Foods that are high in calcium:  The following list shows the number of calcium milligrams (mg) per serving. Your dietitian or healthcare provider can help you create a balanced meal plan for your calcium needs.  Dairy:      1 cup of low-fat plain yogurt (415 mg) or low-fat fruit yogurt (245 to 384 mg)    1½ ounces of shredded cheddar cheese (306 mg) or part skim mozzarella cheese (275 mg)    1 cup of skim, 2%, or whole  milk (300 mg)    1 cup of cottage cheese made with 2% milk fat (138 mg)    ½ cup of frozen yogurt (103 mg)    Other foods:      1 cup of calcium-fortified orange juice (300 mg)    ½ cup of cooked michael greens (220 mg)    4 canned sardines, with bones (242 mg)    ½ cup of tofu (with added calcium) (204 mg)       How to get extra calcium:   Add powdered milk to puddings, cocoa, custard, or hot cereal.    Sift powdered milk into flour when you make cakes, cookies, or breads.    Use low-fat or fat-free milk instead of water in pancake mix, mashed potatoes, pudding, or hot breakfast cereal.    Add low-fat or fat-free cheese to salad, soup, or pasta.    Add tofu (with added calcium) to vegetable stir-fuchs.    Take calcium supplements if you cannot get enough calcium from the foods you eat. Your body can absorb the most calcium from supplements when you take 500 mg or less at one time. Do not take more than 2,500 mg of calcium supplements each day.    Follow up with your doctor or dietitian as directed:  Write down your questions so you remember to ask them during your visits.  © Copyright [employer] 2023 Information is for End User's use only and may not be sold, redistributed or otherwise used for commercial purposes.  The above information is an  only. It is not intended as medical advice for individual conditions or treatments. Talk to your doctor, nurse or pharmacist before following any medical regimen to see if it is safe and effective for you.    EXERCISE RECOMMENDATIONS:  Recommend regular exercise, 30 minutes of cardiovascular, 4-5 times a week (brisk walking, jogging, biking, elliptical).

## 2024-06-08 DIAGNOSIS — O98.512 HERPES SIMPLEX TYPE 2 (HSV-2) INFECTION AFFECTING PREGNANCY, ANTEPARTUM, SECOND TRIMESTER: ICD-10-CM

## 2024-06-08 DIAGNOSIS — B00.9 HERPES SIMPLEX TYPE 2 (HSV-2) INFECTION AFFECTING PREGNANCY, ANTEPARTUM, SECOND TRIMESTER: ICD-10-CM

## 2024-06-08 RX ORDER — VALACYCLOVIR HYDROCHLORIDE 1 G/1
1000 TABLET, FILM COATED ORAL DAILY
Qty: 90 TABLET | Refills: 1 | Status: SHIPPED | OUTPATIENT
Start: 2024-06-08 | End: 2024-12-05

## 2024-08-07 NOTE — PROGRESS NOTES
Assessment    1  Bacterial vaginosis (616 10,041 9) (N76 0,B96 89)   2  Possible exposure to STD (V01 6) (Z20 2)    Plan  Bacterial vaginosis    · MetroNIDAZOLE 500 MG Oral Tablet; TAKE 1 TABLET TWICE DAILY UNTILFINISHED   Rx By: Jann Barton; Dispense: 7 Days ; #:14 Tablet; Refill: 0;For: Bacterial vaginosis; ABENA = N; Sent To: [facility]/PHARMACY #[id number]  · [facility] Body Fluid - POC; Status:Complete;   Done: [id number] 12:42PM   Performed: In Office; [id number];PMLCEHC; For:Bacterial vaginosis; Ordered By:Diana Prasad;   · [address]; Status:Complete;   Done: 28YJY1166 12:42PM   Performed: In Office; [id number];VQDLJJQ; Today;vaginosis; Ordered By:Diana Prasad;  Possible exposure to STD    · (Q) CHLAM/N GONO PROBE W/REFL ENDOCX OR MALE URETHRA; Status:Active; Requested for:27Nov2017;    Perform:[facility]; [id number]; Ordered;exposure to STD; Ordered By:Diana Prasad;    Discussion/Summary  Goals and Barriers: The patient has the current Goals: Resolution of sx  The patent has the current Barriers: None  Patient's Capacity to Self-Care: Patient is able to Self-Care  Chief Complaint  Chief Complaint Free Text Note Form: I think I have an infection      History of Present Illness  HPI: Pt is a 39 y o with LMP 11/15/2017 who presents for evaluation secondary to suspected vaginal infection  The pt reports she reports a vaginal odor x 3 days that is fishy in nature  she reports a yellow discharge x 3 days  She denies pruritus or burning  She reports the odor is exacerbated by intercourse  She reports she has not tried any otc medications  She denies fevers or chills  SHe does report a new sexual partner and is ok with chlamydia and gonorrhea screening  She does report regular condoms use  Review of Systems  Focused-Female:  Constitutional: No fever, no chills, feels well, no tiredness, no recent weight gain or loss    ENT: no ear ache, no loss of hearing, no nosebleeds or nasal discharge, no sore throat or hoarseness  Cardiovascular: no complaints of slow or fast heart rate, no chest pain, no palpitations, no leg claudication or lower extremity edema  Respiratory: no complaints of shortness of breath, no wheezing, no dyspnea on exertion, no orthopnea or PND  Breasts: no complaints of breast pain, breast lump or nipple discharge  Gastrointestinal: no complaints of abdominal pain, no constipation, no nausea or diarrhea, no vomiting, no bloody stools  Genitourinary: as noted in HPI  Musculoskeletal: no complaints of arthralgia, no myalgia, no joint swelling or stiffness, no limb pain or swelling  Integumentary: no complaints of skin rash or lesion, no itching or dry skin, no skin wounds  Neurological: no complaints of headache, no confusion, no numbness or tingling, no dizziness or fainting  Active Problems    1  Attention disturbance (799 51) (R41 840)   2  Dietary calcium deficiency (269 3) (E58)   3  Encounter for gynecological examination without abnormal finding (V72 31) (Z01 419)   4  Generalized anxiety disorder (300 02) (F41 1)   5  Inadequate exercise (V69 0) (Z72 3)   6  Mittelschmerz (625 2) (N94 0)   7  Pap smear abnormality of cervix/human papillomavirus (HPV) positive (795 09,079 4) (R87 89)   8  PMS (premenstrual syndrome) (625 4) (N94 3)   9  Pressure in head (784 0) (R51)   10  Surveillance for birth control, vasectomy (V25 49) (Z30 8)    Past Medical History  1  History of Cervical cancer screening (V76 2) (Z12 4)   2  History of Encounter for genetic screening (V82 79) (Z13 79)   3  History of candidiasis (V12 09) (Z86 19)   4  Denied: History of herpes simplex infection   5  History of pregnancy (V13 29)   6  History of URTI (acute upper respiratory infection) (465 9) (J06 9)   7  History of Varicella without complication (143 3) (E27 3)  Active Problems And Past Medical History Reviewed: The active problems and past medical history were reviewed and updated today        Surgical History  1  Denied: History Of Prior Surgery  Surgical History Reviewed: The surgical history was reviewed and updated today  Family History  Mother    1  Family history of Healthy adult  Father    2  Family history of Healthy adult  Daughter    3  No pertinent family history  Paternal Grandmother    3  Family history of pancreatic cancer (V16 0) (Z80 0)  Maternal Grandfather    5  Family history of hypertension (V17 49) (Z82 49)  Aunt    6  Family history of Sjogren's disease (V17 89) (Z82 69)  Family History Reviewed: The family history was reviewed and updated today  Social History   · Education history   · Denied: History of drug use   · Inadequate exercise (V69 0) (Z72 3)   · Never A Smoker   · No preference on Denominational beliefs   · Occasional alcohol use   · Occupation   · Single    Allergies  1  Amoxil CAPS    Vitals  Vital Signs    Recorded: 53HCY6105 04:58PX   Systolic 210   Diastolic 66   Height 5 ft 4 in   Weight 173 lb    BMI Calculated 29 7   BSA Calculated 1 84   LMP [id number]       Physical Exam   Constitutional  General appearance: No acute distress, well appearing and well nourished  Neck  Neck: Normal, supple, trachea midline, no masses  Pulmonary  Respiratory effort: No increased work of breathing or signs of respiratory distress  Genitourinary  External genitalia: Normal and no lesions appreciated  Vagina: Normal, no lesions or dryness appreciated  Vagina: moderate,-- yellow-- and-- thin vaginal discharge, but-- normal   Urethra: Normal    Urethral meatus: Normal    Bladder: Normal, soft, non-tender and no prolapse or masses appreciated  Cervix: Normal, no palpable masses  Examination of the cervix revealed normal findings,-- no polyps-- and-- no masses  A Pap smear was not performed  Bimanual exam findings include no cervical motion tenderness  Uterus: Normal, non-tender, not enlarged, and no palpable masses     Adnexa/parametria: Normal, non-tender and no fullness or masses appreciated  Abdomen  Abdomen: Normal, non-tender, and no organomegaly noted  Liver and spleen: No hepatomegaly or splenomegaly  Examination for hernias: No hernias appreciated  Skin  Skin and subcutaneous tissue: Normal skin turgor and no rashes     Psychiatric  Orientation to person, place, and time: Normal    Mood and affect: Normal        Signatures   Electronically signed by : ROWAN Tyler ; Nov 27 2017 12:44PM EST                       (Author) glasses

## 2024-10-04 DIAGNOSIS — J01.90 ACUTE NON-RECURRENT SINUSITIS, UNSPECIFIED LOCATION: ICD-10-CM

## 2024-10-04 RX ORDER — ALBUTEROL SULFATE 90 UG/1
2 INHALANT RESPIRATORY (INHALATION) EVERY 6 HOURS PRN
Qty: 18 G | Refills: 0 | Status: CANCELLED | OUTPATIENT
Start: 2024-10-04

## 2025-01-02 DIAGNOSIS — Z30.41 ORAL CONTRACEPTIVE PILL SURVEILLANCE: ICD-10-CM

## 2025-01-02 RX ORDER — ACETAMINOPHEN AND CODEINE PHOSPHATE 120; 12 MG/5ML; MG/5ML
1 SOLUTION ORAL DAILY
Qty: 84 TABLET | Refills: 0 | Status: SHIPPED | OUTPATIENT
Start: 2025-01-02 | End: 2025-06-19

## 2025-01-02 NOTE — TELEPHONE ENCOUNTER
Reason for call:   [x] Refill   [] Prior Auth  [] Other:     Office:   [] PCP/Provider -   [x] Specialty/Provider -     Medication: norethindrone (Ortho Micronor) 0.35 MG       Dose/Frequency: Take 1 tablet (0.35 mg total) by mouth daily     Quantity: 84    Pharmacy: [facility]/pharmacy #[id number] - [address]      Does the patient have enough for 3 days?   [x] Yes   [] No - Send as HP to POD

## 2025-01-09 ENCOUNTER — HOSPITAL ENCOUNTER (OUTPATIENT)
Dept: MAMMOGRAPHY | Facility: IMAGING CENTER | Age: 45
Discharge: HOME | End: 2025-01-09
Payer: COMMERCIAL

## 2025-01-09 VITALS — WEIGHT: 198 LBS | BODY MASS INDEX: 32.99 KG/M2 | HEIGHT: 65 IN

## 2025-01-09 DIAGNOSIS — Z12.31 ENCOUNTER FOR SCREENING MAMMOGRAM FOR BREAST CANCER: ICD-10-CM

## 2025-01-09 PROCEDURE — 77063 BREAST TOMOSYNTHESIS BI: CPT

## 2025-01-09 PROCEDURE — 77067 SCR MAMMO BI INCL CAD: CPT

## 2025-01-15 ENCOUNTER — RESULTS FOLLOW-UP (OUTPATIENT)
Dept: OBGYN CLINIC | Facility: CLINIC | Age: 45
End: 2025-01-15

## 2025-03-25 DIAGNOSIS — Z30.41 ORAL CONTRACEPTIVE PILL SURVEILLANCE: ICD-10-CM

## 2025-03-26 RX ORDER — NORETHINDRONE 0.35 MG/1
1 TABLET ORAL DAILY
Qty: 84 TABLET | Refills: 0 | Status: SHIPPED | OUTPATIENT
Start: 2025-03-26

## 2025-03-26 NOTE — TELEPHONE ENCOUNTER
Patient needs an appointment. Please contact the patient to schedule an appointment. Last office visit: 5/14/24

## 2025-04-01 ENCOUNTER — TELEPHONE (OUTPATIENT)
Age: 45
End: 2025-04-01

## 2025-04-01 NOTE — TELEPHONE ENCOUNTER
Edis called asking for us to send patients medically records pertaining to her scheduled apt with them tomorrow. Unfortunately, due to not having the patients medical release form we were not able to provide Edis with any patient information nor records. We did give him the phone and fax number to our [facility] office.

## 2025-06-25 DIAGNOSIS — Z30.41 ORAL CONTRACEPTIVE PILL SURVEILLANCE: ICD-10-CM

## 2025-06-25 RX ORDER — NORETHINDRONE 0.35 MG/1
1 TABLET ORAL DAILY
Qty: 84 TABLET | Refills: 0 | Status: SHIPPED | OUTPATIENT
Start: 2025-06-25